# Patient Record
Sex: FEMALE | Race: WHITE | Employment: FULL TIME | URBAN - METROPOLITAN AREA
[De-identification: names, ages, dates, MRNs, and addresses within clinical notes are randomized per-mention and may not be internally consistent; named-entity substitution may affect disease eponyms.]

---

## 2017-05-21 ENCOUNTER — HOSPITAL ENCOUNTER (OUTPATIENT)
Dept: LAB | Age: 32
Discharge: HOME | End: 2017-05-21

## 2017-05-21 ENCOUNTER — HOSPITAL ENCOUNTER (EMERGENCY)
Age: 32
Discharge: HOME | End: 2017-05-21
Attending: EMERGENCY MEDICINE

## 2017-05-21 VITALS
RESPIRATION RATE: 16 BRPM | SYSTOLIC BLOOD PRESSURE: 126 MMHG | BODY MASS INDEX: 35.03 KG/M2 | DIASTOLIC BLOOD PRESSURE: 78 MMHG | WEIGHT: 218 LBS | TEMPERATURE: 97.2 F | OXYGEN SATURATION: 100 % | HEART RATE: 88 BPM | HEIGHT: 66 IN

## 2017-05-21 DIAGNOSIS — R19.7 DIARRHEA, UNSPECIFIED TYPE: ICD-10-CM

## 2017-05-21 DIAGNOSIS — J06.9 ACUTE UPPER RESPIRATORY INFECTION: Primary | ICD-10-CM

## 2017-05-21 DIAGNOSIS — R11.2 NAUSEA AND VOMITING, INTRACTABILITY OF VOMITING NOT SPECIFIED, UNSPECIFIED VOMITING TYPE: ICD-10-CM

## 2017-05-21 LAB — S PYO AG THROAT QL: NEGATIVE

## 2017-05-21 PROCEDURE — 87070 CULTURE OTHR SPECIMN AEROBIC: CPT | Performed by: EMERGENCY MEDICINE

## 2017-05-21 RX ORDER — ONDANSETRON 4 MG/1
4 TABLET, ORALLY DISINTEGRATING ORAL
Status: COMPLETED | OUTPATIENT
Start: 2017-05-21 | End: 2017-05-21

## 2017-05-21 RX ORDER — ONDANSETRON 4 MG/1
4 TABLET, ORALLY DISINTEGRATING ORAL
Qty: 10 TAB | Refills: 0 | Status: SHIPPED | OUTPATIENT
Start: 2017-05-21 | End: 2017-08-26

## 2017-05-21 RX ADMIN — ONDANSETRON 4 MG: 4 TABLET, ORALLY DISINTEGRATING ORAL at 19:41

## 2017-05-21 NOTE — LETTER
NOTIFICATION RETURN TO WORK / SCHOOL 
 
5/21/2017 7:50 PM 
 
Ms. Eri Merchant 
[facility] 7 [id number] To Whom It May Concern: Salma Durham is currently under the care of [address] [place] [address]. She will return to work/school on: 5/23/17 If there are questions or concerns please have the patient contact our office. Sincerely, Britton Babinski.  DO

## 2017-05-21 NOTE — UC PROVIDER NOTE
Patient is a 27 y.o. female presenting with nasal congestion. The history is provided by the patient (woke this AM wtih sore throat and congestion and then progressed to NV or gastric contents and several loose stools). Nasal Congestion   This is a new problem. The current episode started 6 to 12 hours ago. The problem occurs constantly. The problem has been gradually worsening. Pertinent negatives include no chest pain, no abdominal pain, no headaches and no shortness of breath. Nothing aggravates the symptoms. Nothing relieves the symptoms. Treatments tried: OTC medications. The treatment provided no relief. Past Medical History:   Diagnosis Date    Lupus ([address]     PVC's (premature ventricular contractions)         Past Surgical History:   Procedure Laterality Date    HX  SECTION  2012    HX ORTHOPAEDIC Bilateral     knee    HX TONSILLECTOMY           History reviewed. No pertinent family history. Social History     Social History    Marital status: LEGALLY      Spouse name: N/A    Number of children: N/A    Years of education: N/A     Occupational History    Not on file. Social History Main Topics    Smoking status: Former Smoker    Smokeless tobacco: Not on file    Alcohol use Yes      Comment: occasional    Drug use: No    Sexual activity: Not on file     Other Topics Concern    Not on file     Social History Narrative                ALLERGIES: Iodine    Review of Systems   Constitutional: Positive for fatigue. Negative for fever. HENT: Positive for congestion, rhinorrhea and sore throat. Negative for sinus pressure. Respiratory: Negative for shortness of breath. Cardiovascular: Negative. Negative for chest pain. Gastrointestinal: Positive for diarrhea, nausea and vomiting. Negative for abdominal pain. Musculoskeletal: Negative. Skin: Negative. Neurological: Negative for headaches.        Vitals:    17 1930   BP: 126/78   Pulse: 88 Resp: 16   Temp: 97.2 °F (36.2 °C)   SpO2: 100%   Weight: 98.9 kg (218 lb)   Height: 5' 6\" (1.676 m)       Physical Exam   Constitutional: She is oriented to person, place, and time. She appears well-developed and well-nourished. No distress. congested   HENT:   Head: Normocephalic. Right Ear: External ear normal.   Left Ear: External ear normal.   Mouth/Throat: No oropharyngeal exudate. Nasal congestion, Mild post pharynx injection   Eyes: Conjunctivae are normal.   Neck: Normal range of motion. No tracheal deviation present. No thyromegaly present. Cardiovascular: Normal rate, regular rhythm and normal heart sounds. No murmur heard. Pulmonary/Chest: Effort normal and breath sounds normal. No respiratory distress. She has no wheezes. She has no rales. She exhibits no tenderness. Abdominal: Soft. She exhibits no distension. There is no tenderness. There is no rebound and no guarding. hyperactive bowel sounds   Musculoskeletal: Normal range of motion. She exhibits no edema or tenderness. Lymphadenopathy:     She has no cervical adenopathy. Neurological: She is alert and oriented to person, place, and time. Skin: Skin is warm. No erythema. Psychiatric: She has a normal mood and affect. Her behavior is normal.   Nursing note and vitals reviewed. MDM     Differential Diagnosis; Clinical Impression; Plan:     Pregnancy test offered and pt declined    CLINICAL IMPRESSION:  Acute upper respiratory infection  (primary encounter diagnosis)  Nausea and vomiting, intractability of vomiting not specified, unspecified vomiting type  Diarrhea, unspecified type    Plan:  1. zofran  2. rest  3. Supportive care and follow up on throat culture    Amount and/or Complexity of Data Reviewed:   Clinical lab tests:  Ordered and reviewed  Risk of Significant Complications, Morbidity, and/or Mortality:   Presenting problems: Moderate  Management options:   Moderate  Progress:   Patient progress: Stable      Procedures

## 2017-05-21 NOTE — DISCHARGE INSTRUCTIONS
Diarrhea: Care Instructions  Your Care Instructions    Diarrhea is loose, watery stools (bowel movements). The exact cause is often hard to find. Sometimes diarrhea is your body's way of getting rid of what caused an upset stomach. Viruses, food poisoning, and many medicines can cause diarrhea. Some people get diarrhea in response to emotional stress, anxiety, or certain foods. Almost everyone has diarrhea now and then. It usually isn't serious, and your stools will return to normal soon. The important thing to do is replace the fluids you have lost, so you can prevent dehydration. The doctor has checked you carefully, but problems can develop later. If you notice any problems or new symptoms, get medical treatment right away. Follow-up care is a key part of your treatment and safety. Be sure to make and go to all appointments, and call your doctor if you are having problems. It's also a good idea to know your test results and keep a list of the medicines you take. How can you care for yourself at home? · Watch for signs of dehydration, which means your body has lost too much water. Dehydration is a serious condition and should be treated right away. Signs of dehydration are:  ¨ Increasing thirst and dry eyes and mouth. ¨ Feeling faint or lightheaded. ¨ Darker urine, and a smaller amount of urine than normal.  · To prevent dehydration, drink plenty of fluids, enough so that your urine is light yellow or clear like water. Choose water and other caffeine-free clear liquids until you feel better. If you have kidney, heart, or liver disease and have to limit fluids, talk with your doctor before you increase the amount of fluids you drink. · Begin eating small amounts of mild foods the next day, if you feel like it. ¨ Try yogurt that has live cultures of Lactobacillus. (Check the label.)  ¨ Avoid spicy foods, fruits, alcohol, and caffeine until 48 hours after all symptoms are gone.   ¨ Avoid chewing gum that contains sorbitol. ¨ Avoid dairy products (except for yogurt with Lactobacillus) while you have diarrhea and for 3 days after symptoms are gone. · The doctor may recommend that you take over-the-counter medicine, such as loperamide (Imodium), if you still have diarrhea after 6 hours. Read and follow all instructions on the label. Do not use this medicine if you have bloody diarrhea, a high fever, or other signs of serious illness. Call your doctor if you think you are having a problem with your medicine. When should you call for help? Call 911 anytime you think you may need emergency care. For example, call if:  · You passed out (lost consciousness). · Your stools are maroon or very bloody. Call your doctor now or seek immediate medical care if:  · You are dizzy or lightheaded, or you feel like you may faint. · Your stools are black and look like tar, or they have streaks of blood. · You have new or worse belly pain. · You have symptoms of dehydration, such as:  ¨ Dry eyes and a dry mouth. ¨ Passing only a little dark urine. ¨ Feeling thirstier than usual.  · You have a new or higher fever. Watch closely for changes in your health, and be sure to contact your doctor if:  · Your diarrhea is getting worse. · You see pus in the diarrhea. · You are not getting better after 2 days (48 hours). Where can you learn more? Go to http://quincy-quynh.info/. Enter M954 in the search box to learn more about \"Diarrhea: Care Instructions. \"  Current as of: May 27, 2016  Content Version: 11.2  © 9765-9121 [employer]. Care instructions adapted under license by [employer] (which disclaims liability or warranty for this information). If you have questions about a medical condition or this instruction, always ask your healthcare professional. [address] any warranty or liability for your use of this information.          Nausea and Vomiting: Care Instructions  Your Care Instructions    When you are nauseated, you may feel weak and sweaty and notice a lot of saliva in your mouth. Nausea often leads to vomiting. Most of the time you do not need to worry about nausea and vomiting, but they can be signs of other illnesses. Two common causes of nausea and vomiting are stomach flu and food poisoning. Nausea and vomiting from viral stomach flu will usually start to improve within 24 hours. Nausea and vomiting from food poisoning may last from 12 to 48 hours. The doctor has checked you carefully, but problems can develop later. If you notice any problems or new symptoms, get medical treatment right away. Follow-up care is a key part of your treatment and safety. Be sure to make and go to all appointments, and call your doctor if you are having problems. It's also a good idea to know your test results and keep a list of the medicines you take. How can you care for yourself at home? · To prevent dehydration, drink plenty of fluids, enough so that your urine is light yellow or clear like water. Choose water and other caffeine-free clear liquids until you feel better. If you have kidney, heart, or liver disease and have to limit fluids, talk with your doctor before you increase the amount of fluids you drink. · Rest in bed until you feel better. · When you are able to eat, try clear soups, mild foods, and liquids until all symptoms are gone for 12 to 48 hours. Other good choices include dry toast, crackers, cooked cereal, and gelatin dessert, such as Jell-O. When should you call for help? Call 911 anytime you think you may need emergency care. For example, call if:  · You passed out (lost consciousness). Call your doctor now or seek immediate medical care if:  · You have symptoms of dehydration, such as:  ¨ Dry eyes and a dry mouth. ¨ Passing only a little dark urine. ¨ Feeling thirstier than usual.  · You have new or worsening belly pain.   · You have a new or higher fever. · You vomit blood or what looks like coffee grounds. Watch closely for changes in your health, and be sure to contact your doctor if:  · You have ongoing nausea and vomiting. · Your vomiting is getting worse. · Your vomiting lasts longer than 2 days. · You are not getting better as expected. Where can you learn more? Go to http://quincy-quynh.info/. Enter 25 [id number] in the search box to learn more about \"Nausea and Vomiting: Care Instructions. \"  Current as of: May 27, 2016  Content Version: 11.2  © 6675-4113 [employer]. Care instructions adapted under license by [employer] (which disclaims liability or warranty for this information). If you have questions about a medical condition or this instruction, always ask your healthcare professional. [place] [address] any warranty or liability for your use of this information. Upper Respiratory Infection (Cold): Care Instructions  Your Care Instructions    An upper respiratory infection, or URI, is an infection of the nose, sinuses, or throat. URIs are spread by coughs, sneezes, and direct contact. The common cold is the most frequent kind of URI. The flu and sinus infections are other kinds of URIs. Almost all URIs are caused by viruses. Antibiotics won't cure them. But you can treat most infections with home care. This may include drinking lots of fluids and taking over-the-counter pain medicine. You will probably feel better in 4 to 10 days. The doctor has checked you carefully, but problems can develop later. If you notice any problems or new symptoms, get medical treatment right away. Follow-up care is a key part of your treatment and safety. Be sure to make and go to all appointments, and call your doctor if you are having problems. It's also a good idea to know your test results and keep a list of the medicines you take. How can you care for yourself at home?   · To prevent dehydration, drink plenty of fluids, enough so that your urine is light yellow or clear like water. Choose water and other caffeine-free clear liquids until you feel better. If you have kidney, heart, or liver disease and have to limit fluids, talk with your doctor before you increase the amount of fluids you drink. · Take an over-the-counter pain medicine, such as acetaminophen (Tylenol), ibuprofen (Advil, Motrin), or naproxen (Aleve). Read and follow all instructions on the label. · Before you use cough and cold medicines, check the label. These medicines may not be safe for young children or for people with certain health problems. · Be careful when taking over-the-counter cold or flu medicines and Tylenol at the same time. Many of these medicines have acetaminophen, which is Tylenol. Read the labels to make sure that you are not taking more than the recommended dose. Too much acetaminophen (Tylenol) can be harmful. · Get plenty of rest.  · Do not smoke or allow others to smoke around you. If you need help quitting, talk to your doctor about stop-smoking programs and medicines. These can increase your chances of quitting for good. When should you call for help? Call 911 anytime you think you may need emergency care. For example, call if:  · You have severe trouble breathing. Call your doctor now or seek immediate medical care if:  · You seem to be getting much sicker. · You have new or worse trouble breathing. · You have a new or higher fever. · You have a new rash. Watch closely for changes in your health, and be sure to contact your doctor if:  · You have a new symptom, such as a sore throat, an earache, or sinus pain. · You cough more deeply or more often, especially if you notice more mucus or a change in the color of your mucus. · You do not get better as expected. Where can you learn more? Go to http://quincy-quynh.info/.   Enter U120 in the search box to learn more about \"Upper Respiratory Infection (Cold): Care Instructions. \"  Current as of: June 30, 2016  Content Version: 11.2  © 4945-0728 [employer]. Care instructions adapted under license by [employer] (which disclaims liability or warranty for this information). If you have questions about a medical condition or this instruction, always ask your healthcare professional. [address] any warranty or liability for your use of this information. Upper Respiratory Infection (Cold): Care Instructions  Your Care Instructions    An upper respiratory infection, or URI, is an infection of the nose, sinuses, or throat. URIs are spread by coughs, sneezes, and direct contact. The common cold is the most frequent kind of URI. The flu and sinus infections are other kinds of URIs. Almost all URIs are caused by viruses. Antibiotics won't cure them. But you can treat most infections with home care. This may include drinking lots of fluids and taking over-the-counter pain medicine. You will probably feel better in 4 to 10 days. The doctor has checked you carefully, but problems can develop later. If you notice any problems or new symptoms, get medical treatment right away. Follow-up care is a key part of your treatment and safety. Be sure to make and go to all appointments, and call your doctor if you are having problems. It's also a good idea to know your test results and keep a list of the medicines you take. How can you care for yourself at home? · To prevent dehydration, drink plenty of fluids, enough so that your urine is light yellow or clear like water. Choose water and other caffeine-free clear liquids until you feel better. If you have kidney, heart, or liver disease and have to limit fluids, talk with your doctor before you increase the amount of fluids you drink.   · Take an over-the-counter pain medicine, such as acetaminophen (Tylenol), ibuprofen (Advil, Motrin), or naproxen (Becca). Read and follow all instructions on the label. · Before you use cough and cold medicines, check the label. These medicines may not be safe for young children or for people with certain health problems. · Be careful when taking over-the-counter cold or flu medicines and Tylenol at the same time. Many of these medicines have acetaminophen, which is Tylenol. Read the labels to make sure that you are not taking more than the recommended dose. Too much acetaminophen (Tylenol) can be harmful. · Get plenty of rest.  · Do not smoke or allow others to smoke around you. If you need help quitting, talk to your doctor about stop-smoking programs and medicines. These can increase your chances of quitting for good. When should you call for help? Call 911 anytime you think you may need emergency care. For example, call if:  · You have severe trouble breathing. Call your doctor now or seek immediate medical care if:  · You seem to be getting much sicker. · You have new or worse trouble breathing. · You have a new or higher fever. · You have a new rash. Watch closely for changes in your health, and be sure to contact your doctor if:  · You have a new symptom, such as a sore throat, an earache, or sinus pain. · You cough more deeply or more often, especially if you notice more mucus or a change in the color of your mucus. · You do not get better as expected. Where can you learn more? Go to http://quincy-quynh.info/. Enter M454 in the search box to learn more about \"Upper Respiratory Infection (Cold): Care Instructions. \"  Current as of: June 30, 2016  Content Version: 11.2  © 1242-2409 [employer]. Care instructions adapted under license by [employer] (which disclaims liability or warranty for this information).  If you have questions about a medical condition or this instruction, always ask your healthcare professional. Madeline Schuler disclaims any warranty or liability for your use of this information.

## 2017-05-23 LAB
BACTERIA SPEC CULT: NORMAL
SERVICE CMNT-IMP: NORMAL

## 2017-07-12 ENCOUNTER — HOSPITAL ENCOUNTER (EMERGENCY)
Age: 32
Discharge: HOME | End: 2017-07-12
Attending: FAMILY MEDICINE

## 2017-07-12 ENCOUNTER — APPOINTMENT (OUTPATIENT)
Dept: GENERAL RADIOLOGY | Age: 32
End: 2017-07-12
Attending: FAMILY MEDICINE

## 2017-07-12 VITALS
OXYGEN SATURATION: 97 % | SYSTOLIC BLOOD PRESSURE: 143 MMHG | BODY MASS INDEX: 35.2 KG/M2 | HEART RATE: 85 BPM | TEMPERATURE: 98.6 F | RESPIRATION RATE: 18 BRPM | WEIGHT: 219 LBS | DIASTOLIC BLOOD PRESSURE: 87 MMHG | HEIGHT: 66 IN

## 2017-07-12 DIAGNOSIS — S20.229A CONTUSION OF BACK, UNSPECIFIED LATERALITY, INITIAL ENCOUNTER: Primary | ICD-10-CM

## 2017-07-12 RX ORDER — METHOCARBAMOL 750 MG/1
750 TABLET, FILM COATED ORAL
Qty: 20 TAB | Refills: 0 | Status: SHIPPED | OUTPATIENT
Start: 2017-07-12 | End: 2017-08-26

## 2017-07-12 RX ORDER — KETOROLAC TROMETHAMINE 30 MG/ML
60 INJECTION, SOLUTION INTRAMUSCULAR; INTRAVENOUS
Status: COMPLETED | OUTPATIENT
Start: 2017-07-12 | End: 2017-07-12

## 2017-07-12 RX ORDER — HYDROCODONE BITARTRATE AND ACETAMINOPHEN 7.5; 325 MG/1; MG/1
1 TABLET ORAL
Qty: 15 TAB | Refills: 0 | Status: SHIPPED | OUTPATIENT
Start: 2017-07-12 | End: 2017-08-26

## 2017-07-12 RX ADMIN — KETOROLAC TROMETHAMINE 60 MG: 30 INJECTION, SOLUTION INTRAMUSCULAR; INTRAVENOUS at 21:00

## 2017-07-13 NOTE — DISCHARGE INSTRUCTIONS
Low Back Pain: Exercises  Your Care Instructions  Here are some examples of typical rehabilitation exercises for your condition. Start each exercise slowly. Ease off the exercise if you start to have pain. Your doctor or physical therapist will tell you when you can start these exercises and which ones will work best for you. How to do the exercises  Press-up    1. Lie on your stomach, supporting your body with your forearms. 2. Press your elbows down into the floor to raise your upper back. As you do this, relax your stomach muscles and allow your back to arch without using your back muscles. As your press up, do not let your hips or pelvis come off the floor. 3. Hold for 15 to 30 seconds, then relax. 4. Repeat 2 to 4 times. Alternate arm and leg (bird dog) exercise    Note: Do this exercise slowly. Try to keep your body straight at all times, and do not let one hip drop lower than the other. 1. Start on the floor, on your hands and knees. 2. Tighten your belly muscles. 3. Raise one leg off the floor, and hold it straight out behind you. Be careful not to let your hip drop down, because that will twist your trunk. 4. Hold for about 6 seconds, then lower your leg and switch to the other leg. 5. Repeat 8 to 12 times on each leg. 6. Over time, work up to holding for 10 to 30 seconds each time. 7. If you feel stable and secure with your leg raised, try raising the opposite arm straight out in front of you at the same time. Knee-to-chest exercise    1. Lie on your back with your knees bent and your feet flat on the floor. 2. Bring one knee to your chest, keeping the other foot flat on the floor (or keeping the other leg straight, whichever feels better on your lower back). 3. Keep your lower back pressed to the floor. Hold for at least 15 to 30 seconds. 4. Relax, and lower the knee to the starting position. 5. Repeat with the other leg. Repeat 2 to 4 times with each leg.   6. To get more stretch, put your other leg flat on the floor while pulling your knee to your chest.  Curl-ups    1. Lie on the floor on your back with your knees bent at a 90-degree angle. Your feet should be flat on the floor, about 12 inches from your buttocks. 2. Cross your arms over your chest. If this bothers your neck, try putting your hands behind your neck (not your head), with your elbows spread apart. 3. Slowly tighten your belly muscles and raise your shoulder blades off the floor. 4. Keep your head in line with your body, and do not press your chin to your chest.  5. Hold this position for 1 or 2 seconds, then slowly lower yourself back down to the floor. 6. Repeat 8 to 12 times. Pelvic tilt exercise    1. Lie on your back with your knees bent. 2. \"Brace\" your stomach. This means to tighten your muscles by pulling in and imagining your belly button moving toward your spine. You should feel like your back is pressing to the floor and your hips and pelvis are rocking back. 3. Hold for about 6 seconds while you breathe smoothly. 4. Repeat 8 to 12 times. Heel dig bridging    1. Lie on your back with both knees bent and your ankles bent so that only your heels are digging into the floor. Your knees should be bent about 90 degrees. 2. Then push your heels into the floor, squeeze your buttocks, and lift your hips off the floor until your shoulders, hips, and knees are all in a straight line. 3. Hold for about 6 seconds as you continue to breathe normally, and then slowly lower your hips back down to the floor and rest for up to 10 seconds. 4. Do 8 to 12 repetitions. Hamstring stretch in doorway    1. Lie on your back in a doorway, with one leg through the open door. 2. Slide your leg up the wall to straighten your knee. You should feel a gentle stretch down the back of your leg. 3. Hold the stretch for at least 15 to 30 seconds. Do not arch your back, point your toes, or bend either knee.  Keep one heel touching the floor and the other heel touching the wall. 4. Repeat with your other leg. 5. Do 2 to 4 times for each leg. Hip flexor stretch    1. Kneel on the floor with one knee bent and one leg behind you. Place your forward knee over your foot. Keep your other knee touching the floor. 2. Slowly push your hips forward until you feel a stretch in the upper thigh of your rear leg. 3. Hold the stretch for at least 15 to 30 seconds. Repeat with your other leg. 4. Do 2 to 4 times on each side. Wall sit    1. Stand with your back 10 to 12 inches away from a wall. 2. Lean into the wall until your back is flat against it. 3. Slowly slide down until your knees are slightly bent, pressing your lower back into the wall. 4. Hold for about 6 seconds, then slide back up the wall. 5. Repeat 8 to 12 times. Follow-up care is a key part of your treatment and safety. Be sure to make and go to all appointments, and call your doctor if you are having problems. It's also a good idea to know your test results and keep a list of the medicines you take. Where can you learn more? Go to http://quincyTrakaquynh.info/. Enter Q089 in the search box to learn more about \"Low Back Pain: Exercises. \"  Current as of: March 21, 2017  Content Version: 11.3  © 2621-6751 [employer]. Care instructions adapted under license by [employer] (which disclaims liability or warranty for this information). If you have questions about a medical condition or this instruction, always ask your healthcare professional. [place] [address] any warranty or liability for your use of this information. Healthy Upper Back: Exercises  Your Care Instructions  Here are some examples of exercises for your upper back. Start each exercise slowly. Ease off the exercise if you start to have pain.   Your doctor or physical therapist will tell you when you can start these exercises and which ones will work best for you.  How to do the exercises  Lower neck and upper back stretch    5. Stretch your arms out in front of your body. Clasp one hand on top of your other hand. 6. Gently reach out so that you feel your shoulder blades stretching away from each other. 7. Gently bend your head forward. 8. Hold for 15 to 30 seconds. 9. Repeat 2 to 4 times. Midback stretch    Note: If you have knee pain, do not do this exercise. 8. Kneel on the floor, and sit back on your ankles. 9. Lean forward, place your hands on the floor, and stretch your arms out in front of you. Rest your head between your arms. 10. Gently push your chest toward the floor, reaching as far in front of you as possible. 11. Hold for 15 to 30 seconds. 12. Repeat 2 to 4 times. Shoulder rolls    7. Sit comfortably with your feet shoulder-width apart. You can also do this exercise while standing. 8. Roll your shoulders up, then back, and then down in a smooth, circular motion. 9. Repeat 2 to 4 times. Wall push-up    7. Stand against a wall with your feet about 12 to 24 inches back from the wall. If you feel any pain when you do this exercise, stand closer to the wall. 8. Place your hands on the wall slightly wider apart than your shoulders, and lean forward. 9. Gently lean your body toward the wall. Then push back to your starting position. Keep the motion smooth and controlled. 10. Repeat 8 to 12 times. Resisted shoulder blade squeeze    Note: For this exercise, you will need elastic exercise material, such as surgical tubing or Thera-Band.  5. Sit or stand, holding the band in both hands in front of you. Keep your elbows close to your sides, bent at a 90-degree angle. Your palms should face up. 6. Squeeze your shoulder blades together, and move your arms to the outside, stretching the band. Be sure to keep your elbows at your sides while you do this. 7. Relax. 8. Repeat 8 to 12 times.   Resisted rows    Note: For this exercise, you will need elastic exercise material, such as surgical tubing or Thera-Band. 5. Put the band around a solid object, such as a bedpost, at about waist level. Hold one end of the band in each hand. 6. With your elbows at your sides and bent to 90 degrees, pull the band back to move your shoulder blades toward each other. Return to the starting position. 7. Repeat 8 to 12 times. Follow-up care is a key part of your treatment and safety. Be sure to make and go to all appointments, and call your doctor if you are having problems. It's also a good idea to know your test results and keep a list of the medicines you take. Where can you learn more? Go to http://quincy-quynh.info/. Enter X701 in the search box to learn more about \"Healthy Upper Back: Exercises. \"  Current as of: March 21, 2017  Content Version: 11.3  © 7222-6355 [employer]. Care instructions adapted under license by [employer] (which disclaims liability or warranty for this information). If you have questions about a medical condition or this instruction, always ask your healthcare professional. [place] [address] any warranty or liability for your use of this information. Contusion: Care Instructions  Your Care Instructions  Contusion is the medical term for a bruise. It is the result of a direct blow or an impact, such as a fall. Contusions are common sports injuries. Most people think of a bruise as a black-and-blue spot. This happens when small blood vessels get torn and leak blood under the skin. But bones, muscles, and organs can also get bruised. This may damage deep tissues but not cause a bruise you can see. The doctor will do a physical exam to find the location of your contusion. You may also have tests to make sure you do not have a more serious injury, such as a broken bone or nerve damage. These may include X-rays or other imaging tests like a CT scan or MRI.   Deep-tissue contusions may cause pain and swelling. But if there is no serious damage, they will often get better in a few weeks with home treatment. The doctor has checked you carefully, but problems can develop later. If you notice any problems or new symptoms, get medical treatment right away. Follow-up care is a key part of your treatment and safety. Be sure to make and go to all appointments, and call your doctor if you are having problems. It's also a good idea to know your test results and keep a list of the medicines you take. How can you care for yourself at home? · Put ice or a cold pack on the sore area for 10 to 20 minutes at a time to stop swelling. Put a thin cloth between the ice pack and your skin. · Be safe with medicines. Read and follow all instructions on the label. ¨ If the doctor gave you a prescription medicine for pain, take it as prescribed. ¨ If you are not taking a prescription pain medicine, ask your doctor if you can take an over-the-counter medicine. · If you can, prop up the sore area on pillows as much as possible for the next few days. Try to keep the sore area above the level of your heart. When should you call for help? Call your doctor now or seek immediate medical care if:  · Your pain gets worse. · You have new or worse swelling. · You have tingling, weakness, or numbness in the area near the contusion. · The area near the contusion is cold or pale. Watch closely for changes in your health, and be sure to contact your doctor if:  · You do not get better as expected. Where can you learn more? Go to http://quincy-quynh.info/. Enter Q840 in the search box to learn more about \"Contusion: Care Instructions. \"  Current as of: March 20, 2017  Content Version: 11.3  © 2987-0840 [employer]. Care instructions adapted under license by [employer] (which disclaims liability or warranty for this information).  If you have questions about a medical condition or this instruction, always ask your healthcare professional. [place] [address] any warranty or liability for your use of this information.

## 2017-07-16 NOTE — UC PROVIDER NOTE
Patient is a 27 y.o. female presenting with back pain. The history is provided by the patient. Back Pain    This is a new problem. The current episode started 3 to 5 hours ago. The problem has not changed since onset. The problem occurs constantly. Patient reports not work related injury. The pain is associated with recent trauma (see nurse note). The pain is present in the upper back, middle back, thoracic spine and generalized. The quality of the pain is described as aching. The pain does not radiate. The pain is at a severity of 6/10. The pain is moderate. The symptoms are aggravated by bending and twisting. Associated symptoms comments: none. She has tried nothing for the symptoms. Past Medical History:   Diagnosis Date    Lupus ([address]     PVC's (premature ventricular contractions)         Past Surgical History:   Procedure Laterality Date    HX  SECTION  2012    HX ORTHOPAEDIC Bilateral     knee    HX TONSILLECTOMY           No family history on file. Social History     Social History    Marital status: LEGALLY      Spouse name: N/A    Number of children: N/A    Years of education: N/A     Occupational History    Not on file. Social History Main Topics    Smoking status: Former Smoker    Smokeless tobacco: Not on file    Alcohol use Yes      Comment: occasional    Drug use: No    Sexual activity: Not on file     Other Topics Concern    Not on file     Social History Narrative                ALLERGIES: Iodine    Review of Systems   Musculoskeletal: Positive for back pain. All other systems reviewed and are negative. Vitals:    17   BP: 143/87   Pulse: 85   Resp: 18   Temp: 98.6 °F (37 °C)   SpO2: 97%   Weight: 99.3 kg (219 lb)   Height: 5' 6\" (1.676 m)       Physical Exam   Constitutional: No distress. Musculoskeletal: She exhibits no edema.         Cervical back: Normal.        Thoracic back: She exhibits decreased range of motion, tenderness, bony tenderness and spasm. She exhibits no swelling, no edema, no deformity and no pain. Lumbar back: Normal.        Back:    Nursing note and vitals reviewed. MDM     Differential Diagnosis; Clinical Impression; Plan:     CLINICAL IMPRESSION:  Contusion of back, unspecified laterality, initial encounter  (primary encounter diagnosis)      DDX    Plan:    No fracture of vertebra on xray  Robaxin/ naprosyn and norco for pain   Amount and/or Complexity of Data Reviewed:   Tests in the radiology section of CPT®:  Ordered and reviewed   Review and summarize past medical records:  Yes  Risk of Significant Complications, Morbidity, and/or Mortality:   Presenting problems: Moderate  Diagnostic procedures: Moderate  Management options:   Moderate      Procedures

## 2017-08-26 ENCOUNTER — HOSPITAL ENCOUNTER (EMERGENCY)
Age: 32
Discharge: HOME | End: 2017-08-26
Attending: FAMILY MEDICINE

## 2017-08-26 VITALS
TEMPERATURE: 98 F | OXYGEN SATURATION: 98 % | HEART RATE: 89 BPM | SYSTOLIC BLOOD PRESSURE: 128 MMHG | DIASTOLIC BLOOD PRESSURE: 73 MMHG | RESPIRATION RATE: 16 BRPM | HEIGHT: 66 IN | BODY MASS INDEX: 34.39 KG/M2 | WEIGHT: 214 LBS

## 2017-08-26 DIAGNOSIS — L02.02 FURUNCLE OF FACE: Primary | ICD-10-CM

## 2017-08-26 RX ORDER — MUPIROCIN 20 MG/G
OINTMENT TOPICAL 3 TIMES DAILY
Qty: 22 G | Refills: 0 | Status: ON HOLD | OUTPATIENT
Start: 2017-08-26 | End: 2017-09-18

## 2017-08-26 RX ORDER — SULFAMETHOXAZOLE AND TRIMETHOPRIM 800; 160 MG/1; MG/1
1 TABLET ORAL 2 TIMES DAILY
Qty: 20 TAB | Refills: 0 | Status: SHIPPED | OUTPATIENT
Start: 2017-08-26 | End: 2017-09-05

## 2017-08-26 NOTE — UC PROVIDER NOTE
Patient is a 27 y.o. female presenting with skin problem. The history is provided by the patient. Skin Problem    This is a new problem. The current episode started more than 2 days ago. The problem has been gradually worsening. The problem is associated with an unknown factor. There has been no fever. The rash is present on the face (rt cheek). The pain is at a severity of 7/10. The pain is moderate. The pain has been constant since onset. Associated symptoms include pain. She has tried nothing for the symptoms. Past Medical History:   Diagnosis Date    Lupus ([address]     PVC's (premature ventricular contractions)         Past Surgical History:   Procedure Laterality Date    HX  SECTION  2012    HX ORTHOPAEDIC Bilateral     knee    HX TONSILLECTOMY           History reviewed. No pertinent family history. Social History     Social History    Marital status: LEGALLY      Spouse name: N/A    Number of children: N/A    Years of education: N/A     Occupational History    Not on file. Social History Main Topics    Smoking status: Former Smoker    Smokeless tobacco: Never Used    Alcohol use Yes      Comment: occasional    Drug use: No    Sexual activity: Not on file     Other Topics Concern    Not on file     Social History Narrative                ALLERGIES: Iodine    Review of Systems   All other systems reviewed and are negative. Vitals:    17 1306 17 1309   BP:  128/73   Pulse:  89   Resp:  16   Temp:  98 °F (36.7 °C)   SpO2:  98%   Weight: 97.1 kg (214 lb)    Height: 5' 6\" (1.676 m)        Physical Exam   Constitutional: No distress. Skin: Lesion (single indurated aerea with exudate base on rt cheek- tender- no drainage ) noted. Nursing note and vitals reviewed.       MDM     Differential Diagnosis; Clinical Impression; Plan:     CLINICAL IMPRESSION:  Furuncle of face  (primary encounter diagnosis)      DDX    Plan:    Warm compress- bactroban and oral bactrim bid x 10 days  Follow in 3 datys   Amount and/or Complexity of Data Reviewed:    Review and summarize past medical records:  Yes  Risk of Significant Complications, Morbidity, and/or Mortality:   Presenting problems: Moderate  Management options:   Moderate  Progress:   Patient progress:  Stable      Procedures

## 2017-08-26 NOTE — DISCHARGE INSTRUCTIONS

## 2017-09-09 ENCOUNTER — HOSPITAL ENCOUNTER (EMERGENCY)
Age: 32
Discharge: HOME | End: 2017-09-09
Attending: FAMILY MEDICINE

## 2017-09-09 VITALS
TEMPERATURE: 98.7 F | HEIGHT: 66 IN | SYSTOLIC BLOOD PRESSURE: 118 MMHG | OXYGEN SATURATION: 98 % | DIASTOLIC BLOOD PRESSURE: 91 MMHG | WEIGHT: 214 LBS | HEART RATE: 100 BPM | RESPIRATION RATE: 16 BRPM | BODY MASS INDEX: 34.39 KG/M2

## 2017-09-09 DIAGNOSIS — M54.31 SCIATICA OF RIGHT SIDE: Primary | ICD-10-CM

## 2017-09-09 RX ORDER — KETOROLAC TROMETHAMINE 30 MG/ML
60 INJECTION, SOLUTION INTRAMUSCULAR; INTRAVENOUS
Status: COMPLETED | OUTPATIENT
Start: 2017-09-09 | End: 2017-09-09

## 2017-09-09 RX ORDER — PREDNISONE 20 MG/1
20 TABLET ORAL DAILY
Qty: 10 TAB | Refills: 0 | Status: ON HOLD | OUTPATIENT
Start: 2017-09-09 | End: 2017-09-18 | Stop reason: DRUGHIGH

## 2017-09-09 RX ADMIN — KETOROLAC TROMETHAMINE 60 MG: 30 INJECTION, SOLUTION INTRAMUSCULAR; INTRAVENOUS at 14:34

## 2017-09-09 NOTE — UC PROVIDER NOTE
Patient is a 27 y.o. female presenting with back pain. The history is provided by the patient. Back Pain    This is a recurrent problem. The current episode started 6 to 12 hours ago. The problem has not changed since onset. The problem occurs constantly. Patient reports not work related injury. The pain is associated with no known injury. The quality of the pain is described as aching. The pain does not radiate. The pain is at a severity of 9/10. The pain is the same all the time. Pertinent negatives include no chest pain and no fever. She has tried nothing for the symptoms. Past Medical History:   Diagnosis Date    Lupus ([address]     PVC's (premature ventricular contractions)         Past Surgical History:   Procedure Laterality Date    HX  SECTION  2012    HX ORTHOPAEDIC Bilateral     knee    HX TONSILLECTOMY           History reviewed. No pertinent family history. Social History     Social History    Marital status: LEGALLY      Spouse name: N/A    Number of children: N/A    Years of education: N/A     Occupational History    Not on file. Social History Main Topics    Smoking status: Former Smoker    Smokeless tobacco: Never Used    Alcohol use Yes      Comment: occasional    Drug use: No    Sexual activity: Not on file     Other Topics Concern    Not on file     Social History Narrative                ALLERGIES: Iodine    Review of Systems   Constitutional: Negative for fever. Cardiovascular: Negative for chest pain. Musculoskeletal: Positive for back pain. Vitals:    17 1357   BP: (!) 118/91   Pulse: 100   Resp: 16   Temp: 98.7 °F (37.1 °C)   SpO2: 98%   Weight: 97.1 kg (214 lb)   Height: 5' 6\" (1.676 m)       Physical Exam   Constitutional: She is oriented to person, place, and time. She appears well-developed and well-nourished. Eyes: Conjunctivae and EOM are normal.   Cardiovascular: Normal rate and regular rhythm.     Pulmonary/Chest: Effort normal and breath sounds normal.   Neurological: She is alert and oriented to person, place, and time. Skin: Skin is warm and dry. Psychiatric: She has a normal mood and affect. Her behavior is normal. Judgment and thought content normal.   Nursing note and vitals reviewed. MDM     Differential Diagnosis; Clinical Impression; Plan:     CLINICAL IMPRESSION:  Sciatica of right side  (primary encounter diagnosis)    Plan:  1. Prednisone 20 mg  2.   3.   Risk of Significant Complications, Morbidity, and/or Mortality:   Presenting problems: Moderate  Diagnostic procedures: Moderate  Management options:   Moderate  Progress:   Patient progress:  Stable      Procedures

## 2017-09-18 ENCOUNTER — APPOINTMENT (OUTPATIENT)
Dept: GENERAL RADIOLOGY | Age: 32
DRG: 853 | End: 2017-09-18
Attending: PHYSICIAN ASSISTANT
Payer: COMMERCIAL

## 2017-09-18 ENCOUNTER — APPOINTMENT (OUTPATIENT)
Dept: MRI IMAGING | Age: 32
DRG: 853 | End: 2017-09-18
Attending: INTERNAL MEDICINE
Payer: COMMERCIAL

## 2017-09-18 ENCOUNTER — APPOINTMENT (OUTPATIENT)
Dept: CT IMAGING | Age: 32
DRG: 853 | End: 2017-09-18
Attending: PHYSICIAN ASSISTANT
Payer: COMMERCIAL

## 2017-09-18 ENCOUNTER — HOSPITAL ENCOUNTER (INPATIENT)
Age: 32
LOS: 7 days | Discharge: HOME HEALTH | DRG: 853 | End: 2017-09-25
Attending: EMERGENCY MEDICINE | Admitting: FAMILY MEDICINE
Payer: COMMERCIAL

## 2017-09-18 DIAGNOSIS — M54.9 RIGHT-SIDED BACK PAIN, UNSPECIFIED BACK LOCATION, UNSPECIFIED CHRONICITY: ICD-10-CM

## 2017-09-18 DIAGNOSIS — M32.9 SYSTEMIC LUPUS ERYTHEMATOSUS, UNSPECIFIED SLE TYPE, UNSPECIFIED ORGAN INVOLVEMENT STATUS (HCC): ICD-10-CM

## 2017-09-18 DIAGNOSIS — R50.9 FEVER, UNSPECIFIED FEVER CAUSE: Primary | ICD-10-CM

## 2017-09-18 DIAGNOSIS — D84.9 IMMUNOCOMPROMISED (HCC): ICD-10-CM

## 2017-09-18 PROBLEM — D72.829 LEUKOCYTOSIS: Status: ACTIVE | Noted: 2017-09-18

## 2017-09-18 PROBLEM — R82.71 BACTERIURIA: Status: ACTIVE | Noted: 2017-09-18

## 2017-09-18 PROBLEM — E87.6 HYPOKALEMIA: Status: ACTIVE | Noted: 2017-09-18

## 2017-09-18 PROBLEM — R65.10 SIRS (SYSTEMIC INFLAMMATORY RESPONSE SYNDROME) (HCC): Status: ACTIVE | Noted: 2017-09-18

## 2017-09-18 PROBLEM — E66.9 OBESITY (BMI 30-39.9): Chronic | Status: ACTIVE | Noted: 2017-09-18

## 2017-09-18 LAB
ALBUMIN SERPL-MCNC: 3 G/DL (ref 3.5–5)
ALBUMIN/GLOB SERPL: 0.7 {RATIO} (ref 1.1–2.2)
ALP SERPL-CCNC: 80 U/L (ref 45–117)
ALT SERPL-CCNC: 63 U/L (ref 12–78)
ANION GAP SERPL CALC-SCNC: 9 MMOL/L (ref 5–15)
APPEARANCE UR: CLEAR
APTT PPP: 35.7 SEC (ref 22.1–32.5)
AST SERPL-CCNC: 15 U/L (ref 15–37)
BACTERIA URNS QL MICRO: ABNORMAL /HPF
BASOPHILS # BLD: 0 K/UL (ref 0–0.1)
BASOPHILS NFR BLD: 0 % (ref 0–1)
BILIRUB SERPL-MCNC: 0.4 MG/DL (ref 0.2–1)
BILIRUB UR QL: NEGATIVE
BUN SERPL-MCNC: 16 MG/DL (ref 6–20)
BUN/CREAT SERPL: 22 (ref 12–20)
CALCIUM SERPL-MCNC: 8.7 MG/DL (ref 8.5–10.1)
CHLORIDE SERPL-SCNC: 102 MMOL/L (ref 97–108)
CO2 SERPL-SCNC: 27 MMOL/L (ref 21–32)
COLOR UR: ABNORMAL
CREAT SERPL-MCNC: 0.72 MG/DL (ref 0.55–1.02)
EOSINOPHIL # BLD: 0 K/UL (ref 0–0.4)
EOSINOPHIL NFR BLD: 0 % (ref 0–7)
EPITH CASTS URNS QL MICRO: ABNORMAL /LPF
ERYTHROCYTE [DISTWIDTH] IN BLOOD BY AUTOMATED COUNT: 12.7 % (ref 11.5–14.5)
GLOBULIN SER CALC-MCNC: 4.1 G/DL (ref 2–4)
GLUCOSE SERPL-MCNC: 98 MG/DL (ref 65–100)
GLUCOSE UR STRIP.AUTO-MCNC: NEGATIVE MG/DL
HCG UR QL: NEGATIVE
HCT VFR BLD AUTO: 38 % (ref 35–47)
HGB BLD-MCNC: 12.9 G/DL (ref 11.5–16)
HGB UR QL STRIP: NEGATIVE
INR PPP: 1.1 (ref 0.9–1.1)
KETONES UR QL STRIP.AUTO: NEGATIVE MG/DL
LACTATE SERPL-SCNC: 1.3 MMOL/L (ref 0.4–2)
LEUKOCYTE ESTERASE UR QL STRIP.AUTO: NEGATIVE
LIPASE SERPL-CCNC: 145 U/L (ref 73–393)
LYMPHOCYTES # BLD: 2 K/UL (ref 0.8–3.5)
LYMPHOCYTES NFR BLD: 12 % (ref 12–49)
MCH RBC QN AUTO: 31.9 PG (ref 26–34)
MCHC RBC AUTO-ENTMCNC: 33.9 G/DL (ref 30–36.5)
MCV RBC AUTO: 94.1 FL (ref 80–99)
MONOCYTES # BLD: 1.4 K/UL (ref 0–1)
MONOCYTES NFR BLD: 9 % (ref 5–13)
NEUTS SEG # BLD: 12.5 K/UL (ref 1.8–8)
NEUTS SEG NFR BLD: 79 % (ref 32–75)
NITRITE UR QL STRIP.AUTO: NEGATIVE
PH UR STRIP: 6 [PH] (ref 5–8)
PLATELET # BLD AUTO: 293 K/UL (ref 150–400)
POTASSIUM SERPL-SCNC: 3.4 MMOL/L (ref 3.5–5.1)
PROT SERPL-MCNC: 7.1 G/DL (ref 6.4–8.2)
PROT UR STRIP-MCNC: NEGATIVE MG/DL
PROTHROMBIN TIME: 11.4 SEC (ref 9–11.1)
RBC # BLD AUTO: 4.04 M/UL (ref 3.8–5.2)
RBC #/AREA URNS HPF: ABNORMAL /HPF (ref 0–5)
SODIUM SERPL-SCNC: 138 MMOL/L (ref 136–145)
SP GR UR REFRACTOMETRY: 1.03 (ref 1–1.03)
THERAPEUTIC RANGE,PTTT: ABNORMAL SECS (ref 58–77)
UR CULT HOLD, URHOLD: NORMAL
UROBILINOGEN UR QL STRIP.AUTO: 0.2 EU/DL (ref 0.2–1)
WBC # BLD AUTO: 15.9 K/UL (ref 3.6–11)
WBC URNS QL MICRO: ABNORMAL /HPF (ref 0–4)

## 2017-09-18 PROCEDURE — 74011000258 HC RX REV CODE- 258: Performed by: FAMILY MEDICINE

## 2017-09-18 PROCEDURE — 87077 CULTURE AEROBIC IDENTIFY: CPT | Performed by: PHYSICIAN ASSISTANT

## 2017-09-18 PROCEDURE — 74176 CT ABD & PELVIS W/O CONTRAST: CPT

## 2017-09-18 PROCEDURE — 74011636637 HC RX REV CODE- 636/637: Performed by: INTERNAL MEDICINE

## 2017-09-18 PROCEDURE — 81001 URINALYSIS AUTO W/SCOPE: CPT | Performed by: PHYSICIAN ASSISTANT

## 2017-09-18 PROCEDURE — 36415 COLL VENOUS BLD VENIPUNCTURE: CPT | Performed by: NEUROLOGICAL SURGERY

## 2017-09-18 PROCEDURE — 85730 THROMBOPLASTIN TIME PARTIAL: CPT | Performed by: NEUROLOGICAL SURGERY

## 2017-09-18 PROCEDURE — 85610 PROTHROMBIN TIME: CPT | Performed by: NEUROLOGICAL SURGERY

## 2017-09-18 PROCEDURE — 87147 CULTURE TYPE IMMUNOLOGIC: CPT | Performed by: PHYSICIAN ASSISTANT

## 2017-09-18 PROCEDURE — 96361 HYDRATE IV INFUSION ADD-ON: CPT

## 2017-09-18 PROCEDURE — 96376 TX/PRO/DX INJ SAME DRUG ADON: CPT

## 2017-09-18 PROCEDURE — 83690 ASSAY OF LIPASE: CPT | Performed by: INTERNAL MEDICINE

## 2017-09-18 PROCEDURE — A9576 INJ PROHANCE MULTIPACK: HCPCS | Performed by: FAMILY MEDICINE

## 2017-09-18 PROCEDURE — 74011250636 HC RX REV CODE- 250/636: Performed by: INTERNAL MEDICINE

## 2017-09-18 PROCEDURE — 74011250636 HC RX REV CODE- 250/636: Performed by: FAMILY MEDICINE

## 2017-09-18 PROCEDURE — 87186 SC STD MICRODIL/AGAR DIL: CPT | Performed by: PHYSICIAN ASSISTANT

## 2017-09-18 PROCEDURE — 87040 BLOOD CULTURE FOR BACTERIA: CPT | Performed by: PHYSICIAN ASSISTANT

## 2017-09-18 PROCEDURE — 85025 COMPLETE CBC W/AUTO DIFF WBC: CPT | Performed by: PHYSICIAN ASSISTANT

## 2017-09-18 PROCEDURE — 71010 XR CHEST PORT: CPT

## 2017-09-18 PROCEDURE — 74011250637 HC RX REV CODE- 250/637: Performed by: INTERNAL MEDICINE

## 2017-09-18 PROCEDURE — 96365 THER/PROPH/DIAG IV INF INIT: CPT

## 2017-09-18 PROCEDURE — 96375 TX/PRO/DX INJ NEW DRUG ADDON: CPT

## 2017-09-18 PROCEDURE — 65270000029 HC RM PRIVATE

## 2017-09-18 PROCEDURE — 81025 URINE PREGNANCY TEST: CPT

## 2017-09-18 PROCEDURE — 80053 COMPREHEN METABOLIC PANEL: CPT | Performed by: PHYSICIAN ASSISTANT

## 2017-09-18 PROCEDURE — 74011000258 HC RX REV CODE- 258: Performed by: PHYSICIAN ASSISTANT

## 2017-09-18 PROCEDURE — 99284 EMERGENCY DEPT VISIT MOD MDM: CPT

## 2017-09-18 PROCEDURE — 72100 X-RAY EXAM L-S SPINE 2/3 VWS: CPT

## 2017-09-18 PROCEDURE — 74011250636 HC RX REV CODE- 250/636: Performed by: HOSPITALIST

## 2017-09-18 PROCEDURE — 83605 ASSAY OF LACTIC ACID: CPT | Performed by: PHYSICIAN ASSISTANT

## 2017-09-18 PROCEDURE — 72158 MRI LUMBAR SPINE W/O & W/DYE: CPT

## 2017-09-18 PROCEDURE — 74011250636 HC RX REV CODE- 250/636: Performed by: PHYSICIAN ASSISTANT

## 2017-09-18 PROCEDURE — 74011000258 HC RX REV CODE- 258: Performed by: HOSPITALIST

## 2017-09-18 RX ORDER — LIDOCAINE 50 MG/G
1 PATCH TOPICAL EVERY 24 HOURS
Status: DISCONTINUED | OUTPATIENT
Start: 2017-09-18 | End: 2017-09-19

## 2017-09-18 RX ORDER — SODIUM CHLORIDE 0.9 % (FLUSH) 0.9 %
5-10 SYRINGE (ML) INJECTION AS NEEDED
Status: DISCONTINUED | OUTPATIENT
Start: 2017-09-18 | End: 2017-09-25 | Stop reason: SDUPTHER

## 2017-09-18 RX ORDER — OXYCODONE AND ACETAMINOPHEN 5; 325 MG/1; MG/1
1 TABLET ORAL
Status: DISCONTINUED | OUTPATIENT
Start: 2017-09-18 | End: 2017-09-18

## 2017-09-18 RX ORDER — SODIUM CHLORIDE 0.9 % (FLUSH) 0.9 %
5-10 SYRINGE (ML) INJECTION EVERY 8 HOURS
Status: DISCONTINUED | OUTPATIENT
Start: 2017-09-18 | End: 2017-09-25 | Stop reason: SDUPTHER

## 2017-09-18 RX ORDER — OXYCODONE AND ACETAMINOPHEN 5; 325 MG/1; MG/1
1 TABLET ORAL
Status: ON HOLD | COMMUNITY
End: 2017-09-25

## 2017-09-18 RX ORDER — HYDROMORPHONE HYDROCHLORIDE 1 MG/ML
1 INJECTION, SOLUTION INTRAMUSCULAR; INTRAVENOUS; SUBCUTANEOUS
Status: COMPLETED | OUTPATIENT
Start: 2017-09-18 | End: 2017-09-18

## 2017-09-18 RX ORDER — KETOROLAC TROMETHAMINE 30 MG/ML
30 INJECTION, SOLUTION INTRAMUSCULAR; INTRAVENOUS
Status: COMPLETED | OUTPATIENT
Start: 2017-09-18 | End: 2017-09-18

## 2017-09-18 RX ORDER — KETOROLAC TROMETHAMINE 30 MG/ML
30 INJECTION, SOLUTION INTRAMUSCULAR; INTRAVENOUS
Status: DISCONTINUED | OUTPATIENT
Start: 2017-09-18 | End: 2017-09-18

## 2017-09-18 RX ORDER — HYDROXYCHLOROQUINE SULFATE 200 MG/1
200 TABLET, FILM COATED ORAL 2 TIMES DAILY
Status: DISCONTINUED | OUTPATIENT
Start: 2017-09-18 | End: 2017-09-20

## 2017-09-18 RX ORDER — DIAZEPAM 10 MG/2ML
2 INJECTION INTRAMUSCULAR
Status: ACTIVE | OUTPATIENT
Start: 2017-09-18 | End: 2017-09-18

## 2017-09-18 RX ORDER — OXYCODONE AND ACETAMINOPHEN 5; 325 MG/1; MG/1
1 TABLET ORAL
Status: DISCONTINUED | OUTPATIENT
Start: 2017-09-18 | End: 2017-09-20

## 2017-09-18 RX ORDER — HYDROMORPHONE HYDROCHLORIDE 1 MG/ML
1 INJECTION, SOLUTION INTRAMUSCULAR; INTRAVENOUS; SUBCUTANEOUS
Status: DISCONTINUED | OUTPATIENT
Start: 2017-09-18 | End: 2017-09-18

## 2017-09-18 RX ORDER — GABAPENTIN 100 MG/1
100 CAPSULE ORAL 3 TIMES DAILY
Status: DISCONTINUED | OUTPATIENT
Start: 2017-09-18 | End: 2017-09-21

## 2017-09-18 RX ORDER — TOPIRAMATE 25 MG/1
50 TABLET ORAL 2 TIMES DAILY
Status: DISCONTINUED | OUTPATIENT
Start: 2017-09-18 | End: 2017-09-25 | Stop reason: HOSPADM

## 2017-09-18 RX ORDER — MORPHINE SULFATE 2 MG/ML
2 INJECTION, SOLUTION INTRAMUSCULAR; INTRAVENOUS
Status: DISCONTINUED | OUTPATIENT
Start: 2017-09-18 | End: 2017-09-25 | Stop reason: HOSPADM

## 2017-09-18 RX ORDER — BUPROPION HYDROCHLORIDE 150 MG/1
150 TABLET, EXTENDED RELEASE ORAL 2 TIMES DAILY
Status: DISCONTINUED | OUTPATIENT
Start: 2017-09-18 | End: 2017-09-25 | Stop reason: HOSPADM

## 2017-09-18 RX ORDER — IBUPROFEN 400 MG/1
800 TABLET ORAL
Status: DISCONTINUED | OUTPATIENT
Start: 2017-09-18 | End: 2017-09-19

## 2017-09-18 RX ORDER — PREDNISONE 20 MG/1
20 TABLET ORAL
Status: DISCONTINUED | OUTPATIENT
Start: 2017-09-19 | End: 2017-09-19

## 2017-09-18 RX ORDER — BUTALBITAL, ACETAMINOPHEN AND CAFFEINE 50; 325; 40 MG/1; MG/1; MG/1
1 TABLET ORAL
Status: DISCONTINUED | OUTPATIENT
Start: 2017-09-18 | End: 2017-09-25 | Stop reason: HOSPADM

## 2017-09-18 RX ORDER — ESCITALOPRAM OXALATE 10 MG/1
10 TABLET ORAL DAILY
Status: DISCONTINUED | OUTPATIENT
Start: 2017-09-18 | End: 2017-09-25 | Stop reason: HOSPADM

## 2017-09-18 RX ORDER — VANCOMYCIN/0.9 % SOD CHLORIDE 1.5G/250ML
1500 PLASTIC BAG, INJECTION (ML) INTRAVENOUS EVERY 12 HOURS
Status: DISCONTINUED | OUTPATIENT
Start: 2017-09-18 | End: 2017-09-18

## 2017-09-18 RX ORDER — BUPROPION HYDROCHLORIDE 150 MG/1
150 TABLET, EXTENDED RELEASE ORAL 2 TIMES DAILY
COMMUNITY
End: 2019-11-20 | Stop reason: SDUPTHER

## 2017-09-18 RX ORDER — PREDNISONE 20 MG/1
40 TABLET ORAL ONCE
Status: COMPLETED | OUTPATIENT
Start: 2017-09-18 | End: 2017-09-18

## 2017-09-18 RX ORDER — VANCOMYCIN 2 GRAM/500 ML IN 0.9 % SODIUM CHLORIDE INTRAVENOUS
2000 ONCE
Status: COMPLETED | OUTPATIENT
Start: 2017-09-18 | End: 2017-09-18

## 2017-09-18 RX ORDER — POTASSIUM CHLORIDE 7.45 MG/ML
10 INJECTION INTRAVENOUS
Status: COMPLETED | OUTPATIENT
Start: 2017-09-18 | End: 2017-09-18

## 2017-09-18 RX ORDER — ACETAMINOPHEN 500 MG
1000 TABLET ORAL EVERY 8 HOURS
Status: DISCONTINUED | OUTPATIENT
Start: 2017-09-18 | End: 2017-09-18

## 2017-09-18 RX ORDER — BUTALBITAL, ACETAMINOPHEN AND CAFFEINE 50; 325; 40 MG/1; MG/1; MG/1
1 TABLET ORAL
Status: DISCONTINUED | OUTPATIENT
Start: 2017-09-18 | End: 2017-09-18

## 2017-09-18 RX ORDER — ONDANSETRON 2 MG/ML
4 INJECTION INTRAMUSCULAR; INTRAVENOUS
Status: COMPLETED | OUTPATIENT
Start: 2017-09-18 | End: 2017-09-18

## 2017-09-18 RX ORDER — AMOXICILLIN 250 MG
1 CAPSULE ORAL DAILY
Status: DISCONTINUED | OUTPATIENT
Start: 2017-09-18 | End: 2017-09-25 | Stop reason: HOSPADM

## 2017-09-18 RX ORDER — ESCITALOPRAM OXALATE 10 MG/1
10 TABLET ORAL DAILY
COMMUNITY
End: 2019-11-20 | Stop reason: ALTCHOICE

## 2017-09-18 RX ORDER — SODIUM CHLORIDE 0.9 % (FLUSH) 0.9 %
10 SYRINGE (ML) INJECTION
Status: COMPLETED | OUTPATIENT
Start: 2017-09-18 | End: 2017-09-18

## 2017-09-18 RX ADMIN — SODIUM CHLORIDE 1000 ML: 900 INJECTION, SOLUTION INTRAVENOUS at 02:35

## 2017-09-18 RX ADMIN — GABAPENTIN 100 MG: 100 CAPSULE ORAL at 15:28

## 2017-09-18 RX ADMIN — KETOROLAC TROMETHAMINE 30 MG: 30 INJECTION, SOLUTION INTRAMUSCULAR at 03:30

## 2017-09-18 RX ADMIN — POTASSIUM CHLORIDE 10 MEQ: 10 INJECTION, SOLUTION INTRAVENOUS at 10:00

## 2017-09-18 RX ADMIN — MORPHINE SULFATE 2 MG: 2 INJECTION, SOLUTION INTRAMUSCULAR; INTRAVENOUS at 19:09

## 2017-09-18 RX ADMIN — GABAPENTIN 100 MG: 100 CAPSULE ORAL at 22:03

## 2017-09-18 RX ADMIN — BUPROPION HYDROCHLORIDE 150 MG: 150 TABLET, EXTENDED RELEASE ORAL at 17:18

## 2017-09-18 RX ADMIN — PREDNISONE 40 MG: 20 TABLET ORAL at 11:27

## 2017-09-18 RX ADMIN — OXYCODONE HYDROCHLORIDE AND ACETAMINOPHEN 1 TABLET: 5; 325 TABLET ORAL at 17:18

## 2017-09-18 RX ADMIN — Medication 10 ML: at 17:19

## 2017-09-18 RX ADMIN — PIPERACILLIN SODIUM,TAZOBACTAM SODIUM 3.38 G: 3; .375 INJECTION, POWDER, FOR SOLUTION INTRAVENOUS at 05:39

## 2017-09-18 RX ADMIN — IBUPROFEN 800 MG: 400 TABLET, FILM COATED ORAL at 11:27

## 2017-09-18 RX ADMIN — HYDROMORPHONE HYDROCHLORIDE 1 MG: 1 INJECTION, SOLUTION INTRAMUSCULAR; INTRAVENOUS; SUBCUTANEOUS at 04:13

## 2017-09-18 RX ADMIN — SODIUM CHLORIDE 1000 ML: 900 INJECTION, SOLUTION INTRAVENOUS at 04:13

## 2017-09-18 RX ADMIN — IBUPROFEN 800 MG: 400 TABLET, FILM COATED ORAL at 17:18

## 2017-09-18 RX ADMIN — BUPROPION HYDROCHLORIDE 150 MG: 150 TABLET, EXTENDED RELEASE ORAL at 11:27

## 2017-09-18 RX ADMIN — ESCITALOPRAM OXALATE 10 MG: 10 TABLET ORAL at 11:27

## 2017-09-18 RX ADMIN — Medication 10 ML: at 22:04

## 2017-09-18 RX ADMIN — POTASSIUM CHLORIDE 10 MEQ: 10 INJECTION, SOLUTION INTRAVENOUS at 10:13

## 2017-09-18 RX ADMIN — Medication 10 ML: at 13:30

## 2017-09-18 RX ADMIN — Medication 10 ML: at 10:13

## 2017-09-18 RX ADMIN — POTASSIUM CHLORIDE 10 MEQ: 10 INJECTION, SOLUTION INTRAVENOUS at 09:50

## 2017-09-18 RX ADMIN — ONDANSETRON 4 MG: 2 INJECTION INTRAMUSCULAR; INTRAVENOUS at 02:36

## 2017-09-18 RX ADMIN — DOCUSATE SODIUM AND SENNOSIDES 1 TABLET: 8.6; 5 TABLET, FILM COATED ORAL at 10:10

## 2017-09-18 RX ADMIN — TOPIRAMATE 50 MG: 25 TABLET, FILM COATED ORAL at 17:18

## 2017-09-18 RX ADMIN — HYDROMORPHONE HYDROCHLORIDE 1 MG: 1 INJECTION, SOLUTION INTRAMUSCULAR; INTRAVENOUS; SUBCUTANEOUS at 02:36

## 2017-09-18 RX ADMIN — GABAPENTIN 100 MG: 100 CAPSULE ORAL at 10:10

## 2017-09-18 RX ADMIN — MORPHINE SULFATE 2 MG: 2 INJECTION, SOLUTION INTRAMUSCULAR; INTRAVENOUS at 23:06

## 2017-09-18 RX ADMIN — HYDROXYCHLOROQUINE SULFATE 200 MG: 200 TABLET, FILM COATED ORAL at 17:18

## 2017-09-18 RX ADMIN — VANCOMYCIN HYDROCHLORIDE 2000 MG: 10 INJECTION, POWDER, LYOPHILIZED, FOR SOLUTION INTRAVENOUS at 09:07

## 2017-09-18 RX ADMIN — OXYCODONE HYDROCHLORIDE AND ACETAMINOPHEN 1 TABLET: 5; 325 TABLET ORAL at 11:27

## 2017-09-18 RX ADMIN — POTASSIUM CHLORIDE 10 MEQ: 10 INJECTION, SOLUTION INTRAVENOUS at 08:20

## 2017-09-18 RX ADMIN — HYDROXYCHLOROQUINE SULFATE 200 MG: 200 TABLET, FILM COATED ORAL at 10:11

## 2017-09-18 RX ADMIN — PIPERACILLIN SODIUM,TAZOBACTAM SODIUM 3.38 G: 3; .375 INJECTION, POWDER, FOR SOLUTION INTRAVENOUS at 11:36

## 2017-09-18 RX ADMIN — GADOTERIDOL 20 ML: 279.3 INJECTION, SOLUTION INTRAVENOUS at 13:30

## 2017-09-18 RX ADMIN — OXYCODONE HYDROCHLORIDE AND ACETAMINOPHEN 1 TABLET: 5; 325 TABLET ORAL at 22:03

## 2017-09-18 RX ADMIN — TOPIRAMATE 50 MG: 25 TABLET, FILM COATED ORAL at 10:10

## 2017-09-18 NOTE — PROGRESS NOTES
Bedside and Verbal shift change report given to [facility] JOSLYN RN (oncoming nurse) by Sara Saldana (offgoing nurse). Report included the following information SBAR, Kardex, Intake/Output, Recent Results and Med Rec Status.

## 2017-09-18 NOTE — ROUTINE PROCESS
TRANSFER - OUT REPORT:    Verbal report given to Tye Albert RN(name) on David Adhikari  being transferred to (unit) for routine progression of care       Report consisted of patients Situation, Background, Assessment and   Recommendations(SBAR). Information from the following report(s) SBAR, ED Summary, Procedure Summary, MAR and Recent Results was reviewed with the receiving nurse. Lines:       Opportunity for questions and clarification was provided.       Visit Vitals    /74 (BP 1 Location: Left arm, BP Patient Position: At rest)    Pulse (!) 107    Temp 99.5 °F (37.5 °C)    Resp 16    Ht 5' 6\" (1.676 m)    Wt 96.2 kg (212 lb)    SpO2 95%    BMI 34.22 kg/m2

## 2017-09-18 NOTE — PROGRESS NOTES
Pharmacist Note - Vancomycin Dosing    Consult provided for this 27 y.o. female for indication of Sepsis. Antibiotic regimen(s): Zosyn    Recent Labs      17   0228   WBC  15.9*   CREA  0.72   BUN  16     Frequency of BMP: tomorrow x1  Height: 167.6 cm  Weight: 96.2 kg  Est CrCl: >100 ml/min  Temp (24hrs), Av.2 °F (37.9 °C), Min:99.5 °F (37.5 °C), Max:100.8 °F (38.2 °C)    Cultures:   blood - in process    Goal trough = 15 - 20 mcg/mL    Therapy will be initiated with a loading dose of 2000 mg IV x 1 to be followed by a maintenance dose of 1500 mg IV every 12 hours. Pharmacy to follow patient daily and order levels / make dose adjustments as appropriate.

## 2017-09-18 NOTE — PROGRESS NOTES
Admission Medication Reconciliation:    Information obtained from: Patient, [address] [place] [address]    Significant PMH/Disease States:   Past Medical History:   Diagnosis Date    Lupus ([address]     PVC's (premature ventricular contractions)        Chief Complaint for this Admission:  Back pain    Allergies:  Iodine    Prior to Admission Medications:   Prior to Admission Medications   Prescriptions Last Dose Informant Patient Reported? Taking? PREDNISONE PO   Yes Yes   Sig: Take  by mouth daily. On prednisone taper. Started with 60mg x 3 days. Today is last day of 40mg and would start 20mg daily x3 days tomorrow. buPROPion SR (WELLBUTRIN SR) 150 mg SR tablet   Yes Yes   Sig: Take 150 mg by mouth two (2) times a day. escitalopram oxalate (LEXAPRO) 10 mg tablet   Yes Yes   Sig: Take 10 mg by mouth daily. hydroxychloroquine (PLAQUENIL) 200 mg tablet   Yes yes   Sig: Take 200 mg by mouth two (2) times a day. oxyCODONE-acetaminophen (PERCOCET) 5-325 mg per tablet   Yes Yes   Sig: Take 1 Tab by mouth every four (4) hours as needed (migraine). topiramate (TOPAMAX) 50 mg tablet   Yes yes   Sig: Take 50 mg by mouth two (2) times a day. Facility-Administered Medications: None     Comments/Recommendations:   1. Added dose for bupropion  2. Added Lexapro, Percocet  3. Removed Bactroban  4. Pt currently on prednisone taper that started with 60mg daily x3 days. Says today would be last day of  40mg x3 days and would be starting 20mg daily x3 days tomorrow.

## 2017-09-18 NOTE — H&P
History & Physical    Date of admission: 9/18/2017    Patient name: Jun Christian  MRN: [id number]  YOB: 1985  Age: 27 y.o. Primary care provider:  Aspen Gusman MD     Source of Information: patient, ED and medical records                               Chief complaint:  Back/ right flank pain    History of present illness  Jun Christian is a 27 y.o. female with past medical history of lupus, premature ventricular contractions, and obesity presented to the ED from home with chief complaint of back and right flank pain. Symptom onset reportedly began ~ 1 week ago, remained constant, progressively worst, severe, aching involving right flank and mostly lower back, aggravated with movement, without alleviating factors. Patient notes that she was [facility], was prescribed steroids and was given Toradol injection. She notes that she was diagnosed with sciatica. Pain now has increased and radiates down right leg. Patient complained of fever, chills. There were no reports of new onset syncope, loss of consciousness, headache, neck pain, visual disturbance, numbness, paresthesias, focal weakness, chest pain, shortness of breath, palpitations, abdominal pain, nausea, vomiting, diarrhea, melena, dysuria, hematuria, calf pain, increased leg swelling/ edema, rash. Workup in the ED included CT abdomen/ pelvis without contrast which showed no acute process. Patient was given Toradol 30 mg IV, Dilaudid 1 mg IV, Valium 2 mg IV, Zofran 4 mg IV, 0.9% normal saline 1000 ml IV fluid bolus x 2, and started on Zosyn 3.375 grams IV x 1 dose. Patient is now seen for admission to the hospitalist service for continued evaluation and treatments.     Past Medical History:   Diagnosis Date    Lupus ([address]     PVC's (premature ventricular contractions)       Past Surgical History:   Procedure Laterality Date  HX  SECTION  2012    HX ORTHOPAEDIC Bilateral     knee    HX TONSILLECTOMY       Prior to Admission medications    Medication Sig Start Date End Date Taking? Authorizing Provider   predniSONE (DELTASONE) 20 mg tablet Take 1 Tab by mouth daily for 10 days. With Breakfast 17  BECKIE Calderon [facility]) 2 % ointment Apply  to affected area three (3) times daily. Apply to area for 10 days 17   Karen Ashford MD   topiramate (TOPAMAX) 50 mg tablet Take 50 mg by mouth two (2) times a day. Historical Provider   hydroxychloroquine (PLAQUENIL) 200 mg tablet Take 200 mg by mouth two (2) times a day. Jessica Spencer MD   BUPROPION HCL (WELLBUTRIN PO) Take  by mouth. Jessica Spencer MD     Allergies   Allergen Reactions    Iodine Anaphylaxis      FAMILY HISTORY:  Stroke                               Father  Hypertension                    Father  Diabetes mellitus             Father/ brother     Social history  Patient resides  x  Independently                Ambulates  x  Independently                 Alcohol history   x  occasional           Smoking history      x  Former smoker         History   Smoking Status    Former Smoker   Smokeless Tobacco    Never Used       Code status  x  Full code       Review of systems  I performed a ten systems review; pertinent positives were as noted in HPI, otherwise negative. Physical Examination   Visit Vitals    /70    Pulse (!) 102    Temp 98.7 °F (37.1 °C)    Resp 17    Ht 5' 6\" (1.676 m)    Wt 96.2 kg (212 lb)    SpO2 96%    BMI 34.22 kg/m2          O2 Device: Room air    General:  Obese female in no acute respiratory distress   Head:  Normocephalic, without obvious abnormality, atraumatic   Eyes:  Conjunctivae/corneas clear. PERRL, EOMs intact   E/N/M/T: Nares normal. Septum midline.  No nasal drainage or sinus tenderness  Tongue midline/ non-edematous  Clear oropharynx   Neck: Normal appearance and movements, symmetrical, trachea midline  No palpable adenopathy  No thyroid enlargement, tenderness or nodules  No carotid bruit   Normal JVD   Lungs:   Symmetrical chest expansion and respiratory effort  Clear to auscultation bilaterally   Chest wall:  No tenderness or deformity   Heart:  Tachycardic  Regular rhythm   Sounds normal; no murmur, click, rub or gallop   Abdomen:   Soft, no tenderness  No rebound, guarding, or rigidity  Non-distended  Bowel sounds normal  No masses or hepatosplenomegaly  No hernias present   Back: No CVA tenderness   Extremities: Extremities normal, atraumatic  No cyanosis or edema     Pulses 2+ radial/ 1+ DP bilateral pulses   Skin: No rashes or ulcers  Warm/ dry   Musculo-      skeletal: Gait not tested  No calf tenderness   Neuro: GCS 15. Moves all extremities x 4. No slurred speech. No facial droop. Sensation grossly intact. Psych: Alert, oriented x 3           Data Review      24 Hour Results:  Recent Results (from the past 24 hour(s))   CBC WITH AUTOMATED DIFF    Collection Time: 09/18/17  2:28 AM   Result Value Ref Range    WBC 15.9 (H) 3.6 - 11.0 K/uL    RBC 4.04 3.80 - 5.20 M/uL    HGB 12.9 11.5 - 16.0 g/dL    HCT 38.0 35.0 - 47.0 %    MCV 94.1 80.0 - 99.0 FL    MCH 31.9 26.0 - 34.0 PG    MCHC 33.9 30.0 - 36.5 g/dL    RDW 12.7 11.5 - 14.5 %    PLATELET 894 477 - 987 K/uL    NEUTROPHILS 79 (H) 32 - 75 %    LYMPHOCYTES 12 12 - 49 %    MONOCYTES 9 5 - 13 %    EOSINOPHILS 0 0 - 7 %    BASOPHILS 0 0 - 1 %    ABS. NEUTROPHILS 12.5 (H) 1.8 - 8.0 K/UL    ABS. LYMPHOCYTES 2.0 0.8 - 3.5 K/UL    ABS. MONOCYTES 1.4 (H) 0.0 - 1.0 K/UL    ABS. EOSINOPHILS 0.0 0.0 - 0.4 K/UL    ABS.  BASOPHILS 0.0 0.0 - 0.1 K/UL   METABOLIC PANEL, COMPREHENSIVE    Collection Time: 09/18/17  2:28 AM   Result Value Ref Range    Sodium 138 136 - 145 mmol/L    Potassium 3.4 (L) 3.5 - 5.1 mmol/L    Chloride 102 97 - 108 mmol/L    CO2 27 21 - 32 mmol/L    Anion gap 9 5 - 15 mmol/L    Glucose 98 65 - 100 mg/dL    BUN 16 6 - 20 MG/DL    Creatinine 0.72 0.55 - 1.02 MG/DL    BUN/Creatinine ratio 22 (H) 12 - 20      GFR est AA >60 >60 ml/min/1.73m2    GFR est non-AA >60 >60 ml/min/1.73m2    Calcium 8.7 8.5 - 10.1 MG/DL    Bilirubin, total 0.4 0.2 - 1.0 MG/DL    ALT (SGPT) 63 12 - 78 U/L    AST (SGOT) 15 15 - 37 U/L    Alk. phosphatase 80 45 - 117 U/L    Protein, total 7.1 6.4 - 8.2 g/dL    Albumin 3.0 (L) 3.5 - 5.0 g/dL    Globulin 4.1 (H) 2.0 - 4.0 g/dL    A-G Ratio 0.7 (L) 1.1 - 2.2     HCG URINE, QL. - POC    Collection Time: 09/18/17  3:29 AM   Result Value Ref Range    Pregnancy test,urine (POC) NEGATIVE  NEG     URINALYSIS W/MICROSCOPIC    Collection Time: 09/18/17  3:32 AM   Result Value Ref Range    Color YELLOW/STRAW      Appearance CLEAR CLEAR      Specific gravity 1.030 1.003 - 1.030      pH (UA) 6.0 5.0 - 8.0      Protein NEGATIVE  NEG mg/dL    Glucose NEGATIVE  NEG mg/dL    Ketone NEGATIVE  NEG mg/dL    Bilirubin NEGATIVE  NEG      Blood NEGATIVE  NEG      Urobilinogen 0.2 0.2 - 1.0 EU/dL    Nitrites NEGATIVE  NEG      Leukocyte Esterase NEGATIVE  NEG      WBC 0-4 0 - 4 /hpf    RBC 0-5 0 - 5 /hpf    Epithelial cells MANY (A) FEW /lpf    Bacteria 1+ (A) NEG /hpf   URINE CULTURE HOLD SAMPLE    Collection Time: 09/18/17  3:32 AM   Result Value Ref Range    Urine culture hold URINE ON HOLD IN MICROBIOLOGY DEPT FOR 3 DAYS     LACTIC ACID    Collection Time: 09/18/17  5:25 AM   Result Value Ref Range    Lactic acid 1.3 0.4 - 2.0 MMOL/L     Recent Labs      09/18/17 0228   WBC  15.9*   HGB  12.9   HCT  38.0   PLT  293     Recent Labs      09/18/17 0228   NA  138   K  3.4*   CL  102   CO2  27   GLU  98   BUN  16   CREA  0.72   CA  8.7   ALB  3.0*   TBILI  0.4   SGOT  15   ALT  63       Imaging  CT abdomen/ pelvis without contrast:  FINDINGS:  The patient is morbidly obese. LOWER CHEST: The visualized portions of the lung bases are clear.   The absence of intravenous contrast material reduces the sensitivity for  evaluation of the solid parenchymal organs of the abdomen. ABDOMEN:  Liver: The liver is normal in size and contour with no focal abnormality. Gallbladder and bile ducts: There are no calcified stones and there is no  biliary duct dilatation. Spleen: No abnormality. Pancreas: No abnormality. Adrenal glands: No abnormality. Kidneys: No focal parenchymal abnormality. There are 2 small nonobstructing left  renal calculus. PELVIS:  Reproductive organs: The uterus contains an intrauterine device. Bladder: No abnormality. RETROPERITONEUM: The aorta tapers without aneurysm. There is no retroperitoneal  adenopathy or mass. There is no pelvic mass or adenopathy. BOWEL AND MESENTERY: The small bowel is normal. The appendix is not identified. PERITONEUM: There is no ascites or free intraperitoneal air. BONES AND SOFT TISSUES: The bones and soft tissues of the abdominal wall are  within normal limits.     IMPRESSION:   1. No acute abdominal or pelvic abnormality. 2. Small nonobstructing left renal calculi. 3. Intrauterine device.          Assessment and Plan   1. SIRS (systemic inflammatory response syndrome)- fever, leukocytosis, tachycardia. Admit to medical floor. Will continue on Zosyn and Vancomycin for now. Check blood cultures. May adjust antibiotics accordingly. 2.  Fever. Etiology of infection unknown. Rule out possible discitis. As symptoms ongoing since last week, think less likely meningitis. Plan as above. May have Tylenol and/or ice packs prn. 3.  Leukocytosis. Plan as noted. Repeat CBC in a.m.    4.  Acute hypokalemia. Repeat labs/ BMP. If K level is low, then provide K replacement. 5.  Tachycardia. Continue vital sign checks. 6.  Systemic lupus erythematosis (SLE). May be flare. Was not improved with recent steroids. 7. VTE prophylaxis. SCDs to BLEs.              Signed by: Yohana Chen MD    September 18, 2017 at 8:31 AM

## 2017-09-18 NOTE — PROGRESS NOTES
Care Management Interventions  PCP Verified by CM: Yes  Mode of Transport at Discharge: Other (see comment) (private vehicle)  Discharge Durable Medical Equipment: No  Physical Therapy Consult: Yes  Occupational Therapy Consult: Yes  Speech Therapy Consult: No  Current Support Network:  (independent with ADLs)  Confirm Follow Up Transport: Self  Freedom of Choice Offered: Yes  Discharge Location  Discharge Placement: Home    CM reviewed chart. Pt was independent with ADLs and IADLs prior to admission. Pt has supportive friends and family. Pt's PCP is Dr. Heather Mace and she sees a Rhuematologist at [facility]. Pt usually gets her prescriptions filled at her local [place] or [facility]. Plan is to return home at time of discharge. CM will continue to follow incase any needs arise.   Ada Dempsey, BSW, ACM

## 2017-09-18 NOTE — CONSULTS
Full note dictated. Briefly, 28yo with lumbar epidural abscess. Neurologically intact. She is complaining of neck pain and upper thoracic pain. I will order an MRI of these regions. Further recommendations pending those results. Thank you for this consultation.

## 2017-09-18 NOTE — CONSULTS
[address] [facility] [address] [facility]   [address] [facility]       Name:  Jina Stevens   MR#:  [id number]   :  1985   Account #:  [de-identified]    Date of Consultation:  2017   Date of Adm:  2017       REASON FOR CONSULTATION: Lumbar epidural abscess. HISTORY OF PRESENT ILLNESS: This is a 22-year-old woman who   started having back pain a little over a week ago. She did not know of   any accident or injury associated with her back pain. It radiated into her   right flank and then down into her right leg. She did go to an outpatient   Urgent care where she was given steroids and a Toradol injection   without any improvement of her symptoms. When her pain became   severe and she started having fever and chills, she came to the   emergency room. She says the fever and chills have been going on for   approximately 2 days. She has not had any bowel or bladder   incontinence. She is also noticing increasing cervical pain with pain   that extends into her occipital region and down into her upper thoracic   region. She does not have any radicular pain into her arms. No   numbness or tingling. PAST MEDICAL HISTORY: Lupus, cardiac arrhythmia, obesity. SURGICAL HISTORY:  section, knee arthroscopy,   tonsillectomy, wisdom teeth removed. SOCIAL HISTORY: She is . She has 2 children, ages 6 and   11. She is a , teaching reading. No tobacco use. Occasional alcohol use. No IV drug use. ALLERGIES: IODINE. MEDICATIONS PRIOR TO ADMISSION:   1. Prednisone 20 mg taper. 2. Topamax 50 mg b.i.d.   3. Plaquenil 200 mg t.i.d.   4. Wellbutrin daily. ANTIBIOTICS: She was started on Zosyn and vancomycin after   admission. FAMILY HISTORY: Stroke, hypertension, diabetes. REVIEW OF SYSTEMS: Positive for headache.  Denies vision   changes, hearing difficulty, chest pain, shortness of breath, abdominal   pain, urinary dysfunction, numbness, muscle spasm, or weakness. No   skin eruption. PHYSICAL EXAMINATION:   VITAL SIGNS: Temperature 100.8 on admission, currently 99.3, blood   pressure 113/75, pulse 100. GENERAL: This is a well-developed, obese woman who is examined   lying in a hospital bed. NEUROLOGIC: She is alert. She is oriented x3. Normal affect. Fluent   speech. Conjugate gaze. Symmetric face. No drift. Full strength in   bilateral upper extremities, deltoids, biceps, triceps, wrist extensors,   hand intrinsics. Full strength in bilateral lower extremities, hip flexors,   quadriceps, tibialis anterior, gastrocnemius, EHL. Reflexes 2+ in   bilateral upper extremities. Negative Eugenia sign; 2+ bilateral   patella. LABORATORY STUDIES: White count 15.9. Coagulation studies not   performed. IMAGING: She has an MRI of the lumbar spine that shows an epidural   abscess from L2 to L5. It is thickest diameter at L3-L4. It is off to the   right causing displacement of the thecal sac and its thickest diameter is   approximately 1 cm. ASSESSMENT AND PLAN: This is a 49-year-old woman who has a   lumbar epidural abscess. Origin is unclear, though she does mention   that she had a cyst on her face that is healing and this was   approximately 3 weeks ago. She does not use IV drugs. She has been   started on Zosyn and vancomycin. Neurologically, she is intact. I am   concerned regarding her cervical pain that she may have an additional   area of abscess. I am going to order an MRI of her cervical spine as   well as thoracic spine to evaluate the entire spinal axis and further   recommendations will be pending those studies. Thank you for this consultation.         MD Marvin Almaguer / Justin Iyer   D:  09/18/2017   18:33   T:  09/18/2017   19:07   Job #:  [id number]

## 2017-09-18 NOTE — IP AVS SNAPSHOT
[address] 
[phone] Patient: Anuj Robles MRN: [id number] RDA:7/4/4400 Current Discharge Medication List  
  
START taking these medications Dose & Instructions Dispensing Information Comments Morning Noon Evening Bedtime  
 cyclobenzaprine 10 mg tablet Commonly known as:  FLEXERIL Your last dose was: Your next dose is:    
   
   
 Dose:  10 mg Take 1 Tab by mouth three (3) times daily as needed for Muscle Spasm(s). Quantity:  30 Tab Refills:  0  
     
   
   
   
  
 gabapentin 100 mg capsule Commonly known as:  NEURONTIN Your last dose was: Your next dose is:    
   
   
 Dose:  200 mg Take 2 Caps by mouth three (3) times daily. Quantity:  180 Cap Refills:  1 L. acidoph & paracasei- S therm- Bifido 8 billion cell Cap cap Commonly known as:  ELLEN-Q/RISAQUAD Your last dose was: Your next dose is:    
   
   
 Dose:  1 Cap Take 1 Cap by mouth daily. Quantity:  30 Cap Refills:  1  
     
   
   
   
  
 senna-docusate 8.6-50 mg per tablet Commonly known as:  Josey Fresh Your last dose was: Your next dose is:    
   
   
 Dose:  1 Tab Take 1 Tab by mouth daily as needed for Constipation. Quantity:  30 Tab Refills:  0 CONTINUE these medications which have CHANGED Dose & Instructions Dispensing Information Comments Morning Noon Evening Bedtime buPROPion  mg SR tablet Commonly known as:  Krystal Randall What changed:  Another medication with the same name was removed. Continue taking this medication, and follow the directions you see here. Your last dose was: Your next dose is:    
   
   
 Dose:  150 mg Take 150 mg by mouth two (2) times a day. Refills:  0 CONTINUE these medications which have NOT CHANGED Dose & Instructions Dispensing Information Comments Morning Noon Evening Bedtime LEXAPRO 10 mg tablet Generic drug:  escitalopram oxalate Your last dose was: Your next dose is:    
   
   
 Dose:  10 mg Take 10 mg by mouth daily. Refills:  0  
     
   
   
   
  
 oxyCODONE-acetaminophen 5-325 mg per tablet Commonly known as:  PERCOCET Your last dose was: Your next dose is:    
   
   
 Dose:  1 Tab Take 1 Tab by mouth every four (4) hours as needed (migraine). Max Daily Amount: 6 Tabs. Quantity:  20 Tab Refills:  0  
     
   
   
   
  
 topiramate 50 mg tablet Commonly known as:  TOPAMAX Your last dose was: Your next dose is:    
   
   
 Dose:  50 mg Take 50 mg by mouth two (2) times a day. Refills:  0 STOP taking these medications PLAQUENIL 200 mg tablet Generic drug:  hydroxychloroquine  
   
  
 predniSONE 20 mg tablet Commonly known as:  DELTASONE  
   
  
 PREDNISONE PO Where to Get Your Medications Information on where to get these meds will be given to you by the nurse or doctor. ! Ask your nurse or doctor about these medications  
  cyclobenzaprine 10 mg tablet  
 gabapentin 100 mg capsule L. acidoph & paracasei- S therm- Bifido 8 billion cell Cap cap  
 oxyCODONE-acetaminophen 5-325 mg per tablet  
 senna-docusate 8.6-50 mg per tablet

## 2017-09-18 NOTE — IP AVS SNAPSHOT
[address] 
[phone] Patient: Мария Aparicio MRN: [id number] IXT:[id number]/[id number] You are allergic to the following Allergen Reactions Iodine Anaphylaxis Recent Documentation Height Weight BMI OB Status Smoking Status 1.676 m 96.2 kg 34.22 kg/m2 IUD Former Smoker Unresulted Labs Order Current Status CULTURE, BLOOD Preliminary result CULTURE, BLOOD Preliminary result CULTURE, BLOOD, PAIRED Preliminary result Emergency Contacts Name Discharge Info Relation Home Work Mobile Debbie Keene DISCHARGE CAREGIVER [3] Mother [14]   [phone] About your hospitalization You were admitted on:  September 18, 2017 You last received care in the:  [address] [place] [address] You were discharged on:  September 25, 2017 Unit phone number:  [phone] Why you were hospitalized Your primary diagnosis was:  Sirs Due To Infectious Process Without Acute Organ Dysfunction (Hcc) Your diagnoses also included:  Hypokalemia, Intractable Back Pain, Obesity (Bmi 30-39.9), Bacteriuria Providers Seen During Your Hospitalizations Provider Role Specialty Primary office phone Claude Castilla, MD Attending Provider Emergency Medicine [phone] Kenyatta Alvarez MD Attending Provider [facility] [phone] Dilan Spence MD Attending Provider Internal Medicine [phone] Your Primary Care Physician (PCP) Primary Care Physician Office Phone Office Fax Odalys Rivera 8 [phone] Follow-up Information Follow up With Details Comments Contact Info Shaq Aaron MD   [address] 
[phone] Juan Ulloa NP   [facility] [address] 
[phone]  Skye Stewart MD Schedule an appointment as soon as possible for a visit in 1 week post-op appointment, wound re-check, suture removal [address] 
[phone] Alonzo Olivares MD In 2 weeks  217 [facility] [address] [facility] Internal Medicine [address] 
[phone] [address] [place] [address] On 9/26/2017 Please call agency if you have not heard anything by 11:00am the day after discharge. [facility] 
1st Floor [facility] [address] 
[phone] Cristiano 40 On 9/25/2017 Home infusion services 2801 [facility]. 1200 [facility] [address] [phone] Your Appointments Tuesday September 26, 2017 To Be Determined PT EVALUATION with DIONNE Salinas 39 (1 [facility]) Jose G 39 (1 [facility]) Tuesday September 26, 2017 To Be Determined START OF CARE with NAYAN Flores 39 (1 [facility]) Jose G 39 (1 [facility]) Current Discharge Medication List  
  
START taking these medications Dose & Instructions Dispensing Information Comments Morning Noon Evening Bedtime  
 cyclobenzaprine 10 mg tablet Commonly known as:  FLEXERIL Your last dose was: Your next dose is:    
   
   
 Dose:  10 mg Take 1 Tab by mouth three (3) times daily as needed for Muscle Spasm(s). Quantity:  30 Tab Refills:  0  
     
   
   
   
  
 gabapentin 100 mg capsule Commonly known as:  NEURONTIN Your last dose was: Your next dose is:    
   
   
 Dose:  200 mg Take 2 Caps by mouth three (3) times daily. Quantity:  180 Cap Refills:  1 L. acidoph & paracasei- S therm- Bifido 8 billion cell Cap cap Commonly known as:  ELLEN-Q/RISAQUAD Your last dose was: Your next dose is: Dose:  1 Cap Take 1 Cap by mouth daily. Quantity:  30 Cap Refills:  1  
     
   
   
   
  
 senna-docusate 8.6-50 mg per tablet Commonly known as:  Edith Greenfields Your last dose was: Your next dose is:    
   
   
 Dose:  1 Tab Take 1 Tab by mouth daily as needed for Constipation. Quantity:  30 Tab Refills:  0 CONTINUE these medications which have CHANGED Dose & Instructions Dispensing Information Comments Morning Noon Evening Bedtime buPROPion  mg SR tablet Commonly known as:  Meño Donaldson What changed:  Another medication with the same name was removed. Continue taking this medication, and follow the directions you see here. Your last dose was: Your next dose is:    
   
   
 Dose:  150 mg Take 150 mg by mouth two (2) times a day. Refills:  0 CONTINUE these medications which have NOT CHANGED Dose & Instructions Dispensing Information Comments Morning Noon Evening Bedtime LEXAPRO 10 mg tablet Generic drug:  escitalopram oxalate Your last dose was: Your next dose is:    
   
   
 Dose:  10 mg Take 10 mg by mouth daily. Refills:  0  
     
   
   
   
  
 oxyCODONE-acetaminophen 5-325 mg per tablet Commonly known as:  PERCOCET Your last dose was: Your next dose is:    
   
   
 Dose:  1 Tab Take 1 Tab by mouth every four (4) hours as needed (migraine). Max Daily Amount: 6 Tabs. Quantity:  20 Tab Refills:  0  
     
   
   
   
  
 topiramate 50 mg tablet Commonly known as:  TOPAMAX Your last dose was: Your next dose is:    
   
   
 Dose:  50 mg Take 50 mg by mouth two (2) times a day. Refills:  0 STOP taking these medications PLAQUENIL 200 mg tablet Generic drug:  hydroxychloroquine  
   
  
 predniSONE 20 mg tablet Commonly known as:  DELTASONE  
   
  
 PREDNISONE PO  
   
 Where to Get Your Medications Information on where to get these meds will be given to you by the nurse or doctor. ! Ask your nurse or doctor about these medications  
  cyclobenzaprine 10 mg tablet  
 gabapentin 100 mg capsule L. acidoph & paracasei- S therm- Bifido 8 billion cell Cap cap  
 oxyCODONE-acetaminophen 5-325 mg per tablet  
 senna-docusate 8.6-50 mg per tablet Discharge Instructions After Hospital Care Plan:  Discharge Instructions Lumbar Surgery Dr. Morena Barbosa Patient Name: Mariposa Pastrana Date of procedure: 9/19/2017 Date of discharge:  
 
Procedure: Procedure(s): SPINE LUMBAR LAMINECTOMY L3-L4 WITH EVACUATION OF ABSCESS    
 
PCP: Mateo Covington MD 
 
Follow up appointments 
-Follow up with Dr. Morena Barbosa in 2 weeks. Call [phone] to make an appointment as soon as you get home from the hospital. 
 
When to call your Spine Surgeon: 
-Signs of infection-if your incision is red; continues to have drainage; drainage has a foul odor or if you have a persistent fever over 101 degrees for 24 hours 
-Nausea or vomiting, severe headache 
-Loss of bowel or bladder function, inability to urinate 
-Changes in sensation in your arms or legs (numbness, tingling, loss of color) -Increased weakness-greater than before your surgery 
-Severe pain or pain not relieved by medications 
-Signs of a blood clot in your leg-calf pain, tenderness, redness, swelling of lower leg When to call your Primary Care Physician: 
-Concerns about medical conditions such as diabetes, high blood pressure, asthma, congestive heart failure 
-Call if blood sugars are elevated, persistent headache or dizziness, coughing or congestion, constipation or diarrhea, burning with urination, abnormal heart rate When to call 911 and go to the nearest emergency room: 
-Acute onset of chest pain, shortness of breath, difficulty breathing Activity - You are going home a well person, be as active as possible. Your only exercise should be walking. Start with short frequent walks and increase your walking distance each day. 
-Limit the amount of time you sit to 20-30 minute intervals. Sitting for prolonged periods of time will be uncomfortable for you following surgery. 
-Do NOT lift anything over 5 pounds 
-Do NOT do any straining, twisting or bending 
-When you are in bed, you may lay on your back or on either side. Do NOT lie on your stomach Diet 
-Resume usual diet; drink plenty of fluids; eat foods high in fiber 
-It is important to have regular bowel movements. Pain medications may cause constipation. You may want to take a stool softener (such as Senokot-S or Colace) to prevent constipation. 
-If constipation occurs, take a laxative (such as Dulcolax tablets, Milk of Magnesia, or a suppository). Laxatives should only be used if the above preventable measures have failed and you still have not had a bowel movement after three days Driving 
-You may not drive or return to work until instructed by your physician. However, you may ride in the car for short periods of time. Incision Care 
-You may take brief showers but do not run the water run directly onto the wound. After showering or bathing, remove the wet dressing and gently blot the wound dry with a soft towel. 
-Do not rub or apply any lotions or ointments to your incision site.  
-Do not soak or scrub your wound 
-Keep a dry dressing (ABD and paper tape) on your incision and have it changed daily for 14 days after surgery; more often if your incision is draining. Have your caregiver wash their hands thoroughly before changing your dressing. 
-You will have absorbable sutures and steristrips (white tape) on your incision. Leave the steristrips on until they fall off. Showering 
-You may shower in approximately 2 days after your surgery. -Leave the dressing on during your shower. Do NOT allow the water to run directly onto your dressing. Once you get out of the shower, put on a dry dressing. 
-Reminder- your brace can be removed while showering. Remember to not bend or twist while your brace is off.   
-Do not take a tub bath. Preventing blood clots 
-You have been given T.E.D. stockings to wear. Continue to wear these for 7 days after your discharge. Put them on in the morning and take them off at night.   
-They are used to increase your circulation and prevent blood clots from forming in your legs 
-T. E.D. stockings can be machine washed, temperature not to exceed 160° F (71°C) and machine dried for 15 to 20 minutes, temperature not to exceed 250° F (121°C). Pain management 
-Take pain medication as prescribed; decrease the amount you use as your pain lessens 
-Do not wait until you are in extreme pain to take your medication. 
-Avoid alcoholic beverages while taking pain medication Pain Medication Safety DO: 
-Read the Medication Guide  
-Take your medicine exactly as prescribed  
-Store your medicine away from children and in a safe place  
-Flush unused medicine down the toilet  
-Call your healthcare provider for medical advice about side effects. You may report side effects to FDA at 1-564-FDA-8626.  
-Please be aware that many medications contain Tylenol. We do not want you to over medicate so please read the information below as a guide. Do not take more than 4 Grams of Tylenol in a 24 hour period. (There are 1000 milligrams in one Gram) Percocet contains 325 mg of Tylenol per tablet (do not take more than 12 tablets in 24 hours) Lortab contains 500 mg of Tylenol per tablet (do not take more than 8 tablets in 24 hours) Norco contains 325 mg of Tylenol per tablet (do not take more than 12 tablets in 24 hours). DO NOT: 
-Do not give your medicine to others  
-Do not take medicine unless it was prescribed for you  
-Do not stop taking your medicine without talking to your healthcare provider  
-Do not break, chew, crush, dissolve, or inject your medicine. If you cannot  swallow your medicine whole, talk to your healthcare provider. 
-Do not drink alcohol while taking this medicine 
-Do not take anti-inflammatory medications or aspirin unless instructed by your  Physician. Discharge Instructions PATIENT ID: Tj Joel MRN: [id number] YOB: 1985 DATE OF ADMISSION: 9/18/2017  1:45 AM   
DATE OF DISCHARGE: 9/25/2017 PRIMARY CARE PROVIDER: David Sharma MD  
 
ATTENDING PHYSICIAN: Jena Resendiz MD 
DISCHARGING PROVIDER: Jena Resendiz MD   
To contact this individual call [phone] and ask the  to page. If unavailable ask to be transferred the Adult Hospitalist Department. DISCHARGE DIAGNOSES Epidural abscess Sepsis CONSULTATIONS: IP CONSULT TO INFECTIOUS DISEASES 
IP CONSULT TO NEUROSURGERY 
IP CONSULT TO CARDIOLOGY PROCEDURES/SURGERIES: Procedure(s): SPINE LUMBAR LAMINECTOMY L3-L4 WITH EVACUATION OF ABSCESS PENDING TEST RESULTS:  
At the time of discharge the following test results are still pending: none FOLLOW UP APPOINTMENTS:  
Follow-up Information Follow up With Details Comments Contact Info David Sharma MD   [address] 
[phone] Tasia Charles NP   [address] 7 [address] 
[phone] Tono Palacios MD Schedule an appointment as soon as possible for a visit in 1 week post-op appointment, wound re-check, suture removal 935 [facility]. [address] [place] [address] 
[phone] Julia Marshall MD In 2 weeks  217 [facility] [address] [facility] Internal Medicine [address] 
[phone] ADDITIONAL CARE RECOMMENDATIONS:  
1) Care of PICC 2) VAncomycin 1500mg IVPB Every 8 hrs until 11/2/17 Adjust dose according to vanco trough between 15-20 3) Draw  Basic metabolic panel and VAnco trough  Every Thursday and Monday 4) CBC/ESR/LFT  every Monday 5) FAx results to Jody Ro  6) Call Joyd Ro with any critical value- [phone] Follow up with Dr Sky Solorzano in 2 weeks Follow up with Dr Carlotta Ku as above DIET: Cardiac Diet ACTIVITY: Activity as tolerated DISCHARGE MEDICATIONS: 
 See Medication Reconciliation Form · It is important that you take the medication exactly as they are prescribed. · Keep your medication in the bottles provided by the pharmacist and keep a list of the medication names, dosages, and times to be taken in your wallet. · Do not take other medications without consulting your doctor. NOTIFY YOUR PHYSICIAN FOR ANY OF THE FOLLOWING:  
Fever over 101 degrees for 24 hours. Chest pain, shortness of breath, fever, chills, nausea, vomiting, diarrhea, change in mentation, falling, weakness, bleeding. Severe pain or pain not relieved by medications. Or, any other signs or symptoms that you may have questions about. DISPOSITION: 
x  Home With: 
 OT  PT  [place]  RN  
  
 SNF/Inpatient Rehab/LTAC Independent/assisted living Hospice Other: CDMP Checked:  
Yes x PROBLEM LIST Updated: 
Yes x Signed:  
Kari Allen MD 
9/25/2017 
3:57 PM 
 
 
 
Discharge Orders Procedure Order Date Status Priority Quantity Spec Type Associated Dx MRI ABD WO CONT 09/18/17 0508 Future STAT 1  Fever, unspecified fever cause [[id number]] Questions: Is Patient Allergic to Contrast Dye?:  Yes   Is Patient Pregnant?:  No  
        
 MRI LUMB SPINE WO CONT 09/18/17 0508 Future STAT 1  Fever, unspecified fever cause [[id number]] Questions: Is Patient Allergic to Contrast Dye?:  Yes Is Patient Pregnant?:  No  
        
  
[facility] Announcement We are excited to announce that we are making your provider's discharge notes available to you in [facility]. You will see these notes when they are completed and signed by the physician that discharged you from your recent hospital stay. If you have any questions or concerns about any information you see in [facility], please call the Health Information Department where you were seen or reach out to your Primary Care Provider for more information about your plan of care. Introducing [facility]! Zahira Rivera introduces [facility] patient portal. Now you can access parts of your medical record, email your doctor's office, and request medication refills online. 1. In your internet browser, go to [email or web address] 2. Click on the First Time User? Click Here link in the Sign In box. You will see the New Member Sign Up page. 3. Enter your [facility] Access Code exactly as it appears below. You will not need to use this code after youve completed the sign-up process. If you do not sign up before the expiration date, you must request a new code. · [facility] Access Code: [email or web address] Expires: 11/24/2017 12:28 PM 
 
4. Enter the last four digits of your Social Security Number (xxxx) and Date of Birth (mm/dd/yyyy) as indicated and click Submit. You will be taken to the next sign-up page. 5. Create a [employer]ot ID. This will be your [facility]lan login ID and cannot be changed, so think of one that is secure and easy to remember. 6. Create a [facility] password. You can change your password at any time. 7. Enter your Password Reset Question and Answer. This can be used at a later time if you forget your password. 8. Enter your e-mail address. You will receive e-mail notification when new information is available in [address] 9. Click Sign Up. You can now view and download portions of your medical record. 10. Click the Download Summary menu link to download a portable copy of your medical information. If you have questions, please visit the Frequently Asked Questions section of the [employer] website. Remember, [employer] is NOT to be used for urgent needs. For medical emergencies, dial 911. Now available from your iPhone and Android! General Information Please provide this summary of care documentation to your next provider. Patient Signature:  ____________________________________________________________ Date:  ____________________________________________________________  
  
[facility] Provider Signature:  ____________________________________________________________ Date:  ____________________________________________________________

## 2017-09-18 NOTE — PROGRESS NOTES
ID  Full consult note to follow  27 yr female with h/o SLE on plaquenil presents with 10 day h/o pain rt side of the back and radiating rt buttock/ In July her 11 yr old son jumped on her back  In sept had a n abscess on the face and took bactrim  MRI shows L5-s1 fact joint infection with extensive epidural abscess from L2-L5.   Hold off antibiotic  Needs drainage of pus for cultures ( bacteria/fungal/Afb)  Neurosurgery following her- will discuss with them

## 2017-09-18 NOTE — PROGRESS NOTES
Hospitalist Progress Note  Office: [phone]      Date of Service:  2017  NAME:  Jazmyn Murdock  :  1985  MRN:  [id number]      Admission Summary:   26 yo woman with lupus and obesity presented to the ED from home on 17 with back pain radiating to the right buttock and right leg. She was diagnosed with sciatica on 17. She was admitted inpatient medical floor for SIRS. Interval history / Subjective:   Still c/o lower back pain radiating to the right buttock and right leg; c/o headache; states pain meds given in ED are wearing off; Tmax 100.8F, tachycardic; brother at bedside     Assessment & Plan:     SIRS with suspected infectious etiology and no organ dysfunction (POA)  - fever, tachycardia, leucocytosis on admission  - lactic acid WNL on admission  - CT abd/pelvis  nonobstructing L kidney stone otherwise no acute  - DDx viral infection, UTI, discitis, epidural abscess, lumbar OM  - had flu vaccine already this season  - BCx  pending  - UA with bacteriuria, epithelials but no LE/nitrites likely contamination; check UCx   - empiric vanc/Zosyn started ; de-escalate prn  - check MRI L-spine r/o OM, discitis, abscess  - add on lipase although unlikely pancreatitis    Back pain with lumbar radiculopathy and recent dx sciatica (POA)  - pain control with scheduled TID ibuprofen, lidocaine patch, gabapentin; prn Percocet  - check MRI L-spine r/o OM, discitis, abscess  - [facility]  and last had Percocet 5/325 #20 ordered on 17 by PCP  - dx sciatica recently at [facility], sent home with prednisone but no improvement  - PT/OT evals    Lupus   - resume home Plaquenil  - Rheum is Dr Naomy [facility] at [facility]    Obesity, Body mass index is 34.22 kg/(m^2).      Hypokalemia (POA) - replete prn    Code status: full  DVT prophylaxis: SCDs    Care Plan discussed with: Patient/Family, Nurse and Case Manager  Disposition: Home with family when stable     Hospital Problems  Date Reviewed: 9/18/2017          Codes Class Noted POA    Hypokalemia ICD-10-CM: E87.6  ICD-9-CM: 276.8  9/18/2017 Unknown        Intractable back pain ICD-10-CM: M54.9  ICD-9-CM: 724.5  9/18/2017 Unknown        * (Principal)SIRS (systemic inflammatory response syndrome) ([facility] [address] ICD-10-CM: R65.10  ICD-9-CM: 995.90  9/18/2017 Yes        Obesity (BMI 30-39.9) (Chronic) ICD-10-CM: E66.9  ICD-9-CM: 278.00  9/18/2017 Yes        Bacteriuria ICD-10-CM: R82.71  ICD-9-CM: 791.9  9/18/2017 Yes            Review of Systems:   Pertinent items are noted in HPI. Vital Signs:    Last 24hrs VS reviewed since prior progress note.  Most recent are:  Visit Vitals    /70    Pulse (!) 102    Temp 98.7 °F (37.1 °C)    Resp 17    Ht 5' 6\" (1.676 m)    Wt 96.2 kg (212 lb)    SpO2 96%    BMI 34.22 kg/m2     No intake or output data in the 24 hours ending 09/18/17 0851     Physical Examination:     Constitutional:  awake, no acute distress, cooperative, pleasant, tearful, obese   ENT:  Oral mucosa moist, oropharynx benign  Neck supple, no masses   Resp:  CTA bilaterally, no wheezing/rhonchi/rales   CV:  regular rhythm, tachycardic, no m/r/g appreciated, no peripheral edema, +pulses    GI:  +BS, soft, non distended, non tender, obese     Musculoskeletal:  moves all extremities but reduced ROM b/l LEs; straight leg raise + on right    Neurologic:  AAOx3, NFD     Skin:  warm, dry  Eyes:  PERRL    Data Review:    Review and/or order of clinical lab test  Review and/or order of tests in the radiology section of CPT  Review and/or order of tests in the medicine section of CPT    Labs:     Recent Labs      09/18/17 0228   WBC  15.9*   HGB  12.9   HCT  38.0   PLT  293     Recent Labs      09/18/17 0228   NA  138   K  3.4*   CL  102   CO2  27   BUN  16   CREA  0.72   GLU  98   CA  8.7     Recent Labs      09/18/17   0228   SGOT  15   ALT  63   AP  80   TBILI 0.4   TP  7.1   ALB  3.0*   GLOB  4.1*     No results for input(s): INR, PTP, APTT in the last 72 hours. No lab exists for component: INREXT   No results for input(s): FE, TIBC, PSAT, FERR in the last 72 hours. No results found for: FOL, RBCF   No results for input(s): PH, PCO2, PO2 in the last 72 hours. No results for input(s): CPK, CKNDX, TROIQ in the last 72 hours.     No lab exists for component: CPKMB  No results found for: CHOL, CHOLX, CHLST, CHOLV, HDL, LDL, LDLC, DLDLP, TGLX, TRIGL, TRIGP, CHHD, CHHDX  No results found for: [facility]  Lab Results   Component Value Date/Time    Color YELLOW/STRAW 09/18/2017 03:32 AM    Appearance CLEAR 09/18/2017 03:32 AM    Specific gravity 1.030 09/18/2017 03:32 AM    Specific gravity 1.024 03/20/2015 02:41 PM    pH (UA) 6.0 09/18/2017 03:32 AM    Protein NEGATIVE  09/18/2017 03:32 AM    Glucose NEGATIVE  09/18/2017 03:32 AM    Ketone NEGATIVE  09/18/2017 03:32 AM    Bilirubin NEGATIVE  09/18/2017 03:32 AM    Urobilinogen 0.2 09/18/2017 03:32 AM    Nitrites NEGATIVE  09/18/2017 03:32 AM    Leukocyte Esterase NEGATIVE  09/18/2017 03:32 AM    Epithelial cells MANY 09/18/2017 03:32 AM    Bacteria 1+ 09/18/2017 03:32 AM    WBC 0-4 09/18/2017 03:32 AM    RBC 0-5 09/18/2017 03:32 AM     Medications Reviewed:     Current Facility-Administered Medications   Medication Dose Route Frequency    diazePAM (VALIUM) injection 2 mg  2 mg IntraVENous NOW    sodium chloride (NS) flush 5-10 mL  5-10 mL IntraVENous Q8H    sodium chloride (NS) flush 5-10 mL  5-10 mL IntraVENous PRN    piperacillin-tazobactam (ZOSYN) 3.375 g in 0.9% sodium chloride (MBP/ADV) 100 mL  3.375 g IntraVENous Q8H    vancomycin (VANCOCIN) 2000 mg in  ml infusion  2,000 mg IntraVENous ONCE    potassium chloride 10 mEq in 100 ml IVPB  10 mEq IntraVENous Q1H    Vancomycin - Pharmacy to Dose   Other Rx Dosing/Monitoring    vancomycin (VANCOCIN) 1500 mg in  ml infusion  1,500 mg IntraVENous Q12H    ketorolac (TORADOL) injection 30 mg  30 mg IntraVENous Q6H PRN    HYDROmorphone (PF) (DILAUDID) injection 1 mg  1 mg IntraVENous Q6H PRN     ______________________________________________________________________  EXPECTED LENGTH OF STAY: - - -  ACTUAL LENGTH OF STAY:          John Coburn MD Clear

## 2017-09-18 NOTE — PROGRESS NOTES
Spiritual Care Partner Volunteer visited patient in 2N on 9/18/17. Documented by:  Patti Padilla M.Div.    Paging Service [phone])

## 2017-09-18 NOTE — ED TRIAGE NOTES
Sat a week ago I started with sciatica  pain in my R buttocks. Pain has increased to the point I can't get comfortable in any position. Pain travels down my R leg.

## 2017-09-18 NOTE — PROGRESS NOTES
Bedside shift change report given to Vilma Parks RN (oncoming nurse) by Gonzales Rey RN (offgoing nurse). Report included the following information SBAR, Kardex, Intake/Output, MAR and Recent Results.

## 2017-09-18 NOTE — PROGRESS NOTES
TRANSFER - IN REPORT:    Verbal report received from [facility] CLEAR [place]) on Antonia Walker  being received from ED(unit) for routine progression of care      Report consisted of patients Situation, Background, Assessment and   Recommendations(SBAR). Information from the following report(s) SBAR, Kardex, ED Summary and MAR was reviewed with the receiving nurse. Opportunity for questions and clarification was provided. Assessment completed upon patients arrival to unit and care assumed.

## 2017-09-18 NOTE — CONSULTS
ID Consult Note    NAME:  Mariposa Pastrana                    Requesting Provider  Sindhu Loyola                          :   1985                      DOA  MRN:   [id number]   Date/Time:  2017 5:45 PM  Subjective:   REASON FOR CONSULT:   Epidural abscess    HPI/Hospital course   Aj Morfin is a 27 y. o. with a history of SLE followed at [facility] on [facility] presents with back pain for more than a week. Pain radiating down rt buttock X 1 week, Fever X 2 days  Pt says she developed severe low back pain more on the right side 10 days ago and was taking diclofenac. The pain was radiating down her rt buttock. she had gone to [facility] on  and was given prednisone 20mg  and sent home. She went to her PCP who asked her to increase the steroids. She returned to ED on  as she was not getting better. She also started having fever and chills. In the ED she had spinal xray , wbc was > 16 and had fever. She later had MRI which revealed Extensive epidural abscess from the L2 level to the mid L5 in the posterior right spinal canal, likely originating from inflammatory changes in the right L5-S1 facet joint. She was seen by neurosurgeon who asked for MRI of the thoracic and cervical spine because eof pain. Pt was in [facility] in 2017 with back pain after her 11 yr old son jumped on her back. She had Xray lumbar spine which was normal. She was asked to take analgesics. 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 She says she had a face cyst 3 weeks ago and it was squeezed and purulent discharge was obtained and her PCP gave her bactrim with near resolution.           Data  this hospitalization  Date  TMAX WBC Abn labs Intervention  Cultures ABX    100.8 15.9 K 3.4  ALT 63  AST 15  BC                                                                     Medical history  Lupus  Migraine    PSH- csection  Tonsillectomy  Knee surgery    SOCX- non smoker  Lives with 2 children and her partner    [facility]- CVA/ HTN - father  DM-brother    ALLERGY - iodine - anaphylaxis  Medications  Prenisone  Bupropion  Plaquenil  topamax        REVIEW OF SYSTEMS:     Const:   fever, chills, negative weight loss  Eyes:   negative diplopia or visual changes, negative eye pain  ENT:   negative coryza, negative sore throat  Resp:   negative cough, hemoptysis, dyspnea  Cards:  negative for chest pain, palpitations, lower extremity edema  :  negative for frequency, dysuria and hematuria or incontinence or retention  Heme:  negative for easy bruising and gum/nose bleeding  MS:   myalgias, arthralgias, back pain and muscle weakness  Neurolo:   headaches, no dizziness, vertigo, memory problems   Psych:  negative for feelings of anxiety, depression       Objective:   VITALS:    Visit Vitals    /75 (BP 1 Location: Left arm, BP Patient Position: At rest)    Pulse 100    Temp 99.3 °F (37.4 °C)    Resp 16    Ht 5' 6\" (1.676 m)    Wt 212 lb (96.2 kg)    SpO2 96%    BMI 34.22 kg/m2     Temp (24hrs), Av.7 °F (37.6 °C), Min:98.7 °F (37.1 °C), Max:100.8 °F (38.2 °C)    PHYSICAL EXAM:   General:    Alert cooperative- in some distress  Head:   Normocephalic, without obvious abnormality, atraumatic. Eyes:   Conjunctivae clear, anicteric sclerae. Pupils are equal  Nose:  Nares normal. No drainage or sinus tenderness. Throat:    Lips, mucosa, and tongue normal.  No Thrush  Neck:  Supple,      Back:    Tenderness over rt SI joint area  Lungs:   B/l air entry. Heart:   s1s2  Abdomen:   Soft, non-tender,not distended. Bowel sounds normal. No masses  Extremities: Extremities normal, atraumatic, no cyanosis. No edema. No clubbing  Skin:     No rashes or lesions.   Not Jaundiced  Lymph: Cervical, supraclavicular normal.  Neurologic: Grossly non-focal    Pertinent Labs   As above      IMAGING RESULTS:      Impression/Recommendation    27 yr female with lupus on plaquenil presents with 1 week h/o rt sided low back pain with radiation    L5-S1 facet joint infection with epidural abscess extending between l2-l5  Likely bacterial in origin- recent skin abscess on her face points to staph aureus. Could the plaquenil/sle  put her at mild immune suppression . Likely the trauma due to her 11 yr old son jumping on her back 2 months ago is contributing to this as well  Will hold off vanco /zosyn ( she got a dose of each)  until we have abscess culture sent. If she decompensates then she will need it soon , other wise wait  For Surgery/IR procedure      Lupus- on plaquenil- hold until acute infection is resolved  Discussed the management with patient and her brother in great detail.   Also discussed with   ___________________________________________________    Discussed with patient, requesting provider

## 2017-09-19 ENCOUNTER — APPOINTMENT (OUTPATIENT)
Dept: GENERAL RADIOLOGY | Age: 32
DRG: 853 | End: 2017-09-19
Attending: NEUROLOGICAL SURGERY
Payer: COMMERCIAL

## 2017-09-19 ENCOUNTER — APPOINTMENT (OUTPATIENT)
Dept: MRI IMAGING | Age: 32
DRG: 853 | End: 2017-09-19
Attending: NEUROLOGICAL SURGERY
Payer: COMMERCIAL

## 2017-09-19 ENCOUNTER — ANESTHESIA EVENT (OUTPATIENT)
Dept: SURGERY | Age: 32
DRG: 853 | End: 2017-09-19
Payer: COMMERCIAL

## 2017-09-19 ENCOUNTER — ANESTHESIA (OUTPATIENT)
Dept: SURGERY | Age: 32
DRG: 853 | End: 2017-09-19
Payer: COMMERCIAL

## 2017-09-19 LAB
ANION GAP SERPL CALC-SCNC: 9 MMOL/L (ref 5–15)
BASOPHILS # BLD: 0 K/UL (ref 0–0.1)
BASOPHILS NFR BLD: 0 % (ref 0–1)
BUN SERPL-MCNC: 9 MG/DL (ref 6–20)
BUN/CREAT SERPL: 14 (ref 12–20)
CALCIUM SERPL-MCNC: 8.7 MG/DL (ref 8.5–10.1)
CHLORIDE SERPL-SCNC: 109 MMOL/L (ref 97–108)
CO2 SERPL-SCNC: 21 MMOL/L (ref 21–32)
CREAT SERPL-MCNC: 0.64 MG/DL (ref 0.55–1.02)
CRP SERPL-MCNC: 26.4 MG/DL (ref 0–0.6)
EOSINOPHIL # BLD: 0 K/UL (ref 0–0.4)
EOSINOPHIL NFR BLD: 0 % (ref 0–7)
ERYTHROCYTE [DISTWIDTH] IN BLOOD BY AUTOMATED COUNT: 12.8 % (ref 11.5–14.5)
ERYTHROCYTE [SEDIMENTATION RATE] IN BLOOD: 117 MM/HR (ref 0–20)
GLUCOSE SERPL-MCNC: 97 MG/DL (ref 65–100)
HCT VFR BLD AUTO: 34.8 % (ref 35–47)
HGB BLD-MCNC: 11.5 G/DL (ref 11.5–16)
LYMPHOCYTES # BLD: 1.7 K/UL (ref 0.8–3.5)
LYMPHOCYTES NFR BLD: 8 % (ref 12–49)
MCH RBC QN AUTO: 31.5 PG (ref 26–34)
MCHC RBC AUTO-ENTMCNC: 33 G/DL (ref 30–36.5)
MCV RBC AUTO: 95.3 FL (ref 80–99)
MONOCYTES # BLD: 1.3 K/UL (ref 0–1)
MONOCYTES NFR BLD: 6 % (ref 5–13)
NEUTS SEG # BLD: 18.8 K/UL (ref 1.8–8)
NEUTS SEG NFR BLD: 86 % (ref 32–75)
PLATELET # BLD AUTO: 266 K/UL (ref 150–400)
POTASSIUM SERPL-SCNC: 3.9 MMOL/L (ref 3.5–5.1)
RBC # BLD AUTO: 3.65 M/UL (ref 3.8–5.2)
SODIUM SERPL-SCNC: 139 MMOL/L (ref 136–145)
WBC # BLD AUTO: 21.8 K/UL (ref 3.6–11)

## 2017-09-19 PROCEDURE — 87077 CULTURE AEROBIC IDENTIFY: CPT | Performed by: FAMILY MEDICINE

## 2017-09-19 PROCEDURE — 74011000272 HC RX REV CODE- 272: Performed by: NEUROLOGICAL SURGERY

## 2017-09-19 PROCEDURE — 77030002933 HC SUT MCRYL J&J -A: Performed by: NEUROLOGICAL SURGERY

## 2017-09-19 PROCEDURE — 00NY0ZZ RELEASE LUMBAR SPINAL CORD, OPEN APPROACH: ICD-10-PCS | Performed by: NEUROLOGICAL SURGERY

## 2017-09-19 PROCEDURE — 74011000250 HC RX REV CODE- 250: Performed by: NEUROLOGICAL SURGERY

## 2017-09-19 PROCEDURE — 74011250636 HC RX REV CODE- 250/636

## 2017-09-19 PROCEDURE — 77030018836 HC SOL IRR NACL ICUM -A: Performed by: NEUROLOGICAL SURGERY

## 2017-09-19 PROCEDURE — 87205 SMEAR GRAM STAIN: CPT | Performed by: FAMILY MEDICINE

## 2017-09-19 PROCEDURE — 74011250636 HC RX REV CODE- 250/636: Performed by: INTERNAL MEDICINE

## 2017-09-19 PROCEDURE — 77030014647 HC SEAL FBRN TISSL BAXT -D: Performed by: NEUROLOGICAL SURGERY

## 2017-09-19 PROCEDURE — 77030002916 HC SUT ETHLN J&J -A: Performed by: NEUROLOGICAL SURGERY

## 2017-09-19 PROCEDURE — 85025 COMPLETE CBC W/AUTO DIFF WBC: CPT | Performed by: FAMILY MEDICINE

## 2017-09-19 PROCEDURE — 77030032490 HC SLV COMPR SCD KNE COVD -B: Performed by: NEUROLOGICAL SURGERY

## 2017-09-19 PROCEDURE — 87147 CULTURE TYPE IMMUNOLOGIC: CPT | Performed by: INTERNAL MEDICINE

## 2017-09-19 PROCEDURE — 86140 C-REACTIVE PROTEIN: CPT | Performed by: FAMILY MEDICINE

## 2017-09-19 PROCEDURE — 87040 BLOOD CULTURE FOR BACTERIA: CPT | Performed by: INTERNAL MEDICINE

## 2017-09-19 PROCEDURE — 77030029099 HC BN WAX SSPC -A: Performed by: NEUROLOGICAL SURGERY

## 2017-09-19 PROCEDURE — 009300Z DRAINAGE OF INTRACRANIAL EPIDURAL SPACE WITH DRAINAGE DEVICE, OPEN APPROACH: ICD-10-PCS | Performed by: NEUROLOGICAL SURGERY

## 2017-09-19 PROCEDURE — 74011250636 HC RX REV CODE- 250/636: Performed by: ANESTHESIOLOGY

## 2017-09-19 PROCEDURE — 76060000036 HC ANESTHESIA 2.5 TO 3 HR: Performed by: NEUROLOGICAL SURGERY

## 2017-09-19 PROCEDURE — 74011250637 HC RX REV CODE- 250/637: Performed by: INTERNAL MEDICINE

## 2017-09-19 PROCEDURE — 76210000016 HC OR PH I REC 1 TO 1.5 HR: Performed by: NEUROLOGICAL SURGERY

## 2017-09-19 PROCEDURE — 87147 CULTURE TYPE IMMUNOLOGIC: CPT | Performed by: FAMILY MEDICINE

## 2017-09-19 PROCEDURE — 77030003029 HC SUT VCRL J&J -B: Performed by: NEUROLOGICAL SURGERY

## 2017-09-19 PROCEDURE — 74011000250 HC RX REV CODE- 250: Performed by: INTERNAL MEDICINE

## 2017-09-19 PROCEDURE — 74011000258 HC RX REV CODE- 258: Performed by: INTERNAL MEDICINE

## 2017-09-19 PROCEDURE — 77030026438 HC STYL ET INTUB CARD -A: Performed by: ANESTHESIOLOGY

## 2017-09-19 PROCEDURE — 77030031139 HC SUT VCRL2 J&J -A: Performed by: NEUROLOGICAL SURGERY

## 2017-09-19 PROCEDURE — 77030008684 HC TU ET CUF COVD -B: Performed by: ANESTHESIOLOGY

## 2017-09-19 PROCEDURE — 87186 SC STD MICRODIL/AGAR DIL: CPT | Performed by: FAMILY MEDICINE

## 2017-09-19 PROCEDURE — A9576 INJ PROHANCE MULTIPACK: HCPCS | Performed by: HOSPITALIST

## 2017-09-19 PROCEDURE — 74011000250 HC RX REV CODE- 250

## 2017-09-19 PROCEDURE — 72156 MRI NECK SPINE W/O & W/DYE: CPT

## 2017-09-19 PROCEDURE — 74011250636 HC RX REV CODE- 250/636: Performed by: HOSPITALIST

## 2017-09-19 PROCEDURE — 80048 BASIC METABOLIC PNL TOTAL CA: CPT | Performed by: FAMILY MEDICINE

## 2017-09-19 PROCEDURE — 72157 MRI CHEST SPINE W/O & W/DYE: CPT

## 2017-09-19 PROCEDURE — 76010000172 HC OR TIME 2.5 TO 3 HR INTENSV-TIER 1: Performed by: NEUROLOGICAL SURGERY

## 2017-09-19 PROCEDURE — 36415 COLL VENOUS BLD VENIPUNCTURE: CPT | Performed by: FAMILY MEDICINE

## 2017-09-19 PROCEDURE — 77030012406 HC DRN WND PENRS BARD -A: Performed by: NEUROLOGICAL SURGERY

## 2017-09-19 PROCEDURE — 85652 RBC SED RATE AUTOMATED: CPT | Performed by: INTERNAL MEDICINE

## 2017-09-19 PROCEDURE — 77030010507 HC ADH SKN DERMBND J&J -B: Performed by: NEUROLOGICAL SURGERY

## 2017-09-19 PROCEDURE — 77030012893

## 2017-09-19 PROCEDURE — 87075 CULTR BACTERIA EXCEPT BLOOD: CPT | Performed by: FAMILY MEDICINE

## 2017-09-19 PROCEDURE — 72020 X-RAY EXAM OF SPINE 1 VIEW: CPT

## 2017-09-19 PROCEDURE — 77030013079 HC BLNKT BAIR HGGR 3M -A: Performed by: ANESTHESIOLOGY

## 2017-09-19 PROCEDURE — 74011250637 HC RX REV CODE- 250/637: Performed by: NURSE PRACTITIONER

## 2017-09-19 PROCEDURE — 65270000032 HC RM SEMIPRIVATE

## 2017-09-19 RX ORDER — SODIUM CHLORIDE, SODIUM LACTATE, POTASSIUM CHLORIDE, CALCIUM CHLORIDE 600; 310; 30; 20 MG/100ML; MG/100ML; MG/100ML; MG/100ML
25 INJECTION, SOLUTION INTRAVENOUS CONTINUOUS
Status: DISCONTINUED | OUTPATIENT
Start: 2017-09-19 | End: 2017-09-19 | Stop reason: HOSPADM

## 2017-09-19 RX ORDER — DIPHENHYDRAMINE HCL 25 MG
25 CAPSULE ORAL
Status: DISCONTINUED | OUTPATIENT
Start: 2017-09-19 | End: 2017-09-25 | Stop reason: HOSPADM

## 2017-09-19 RX ORDER — PHENYLEPHRINE HCL IN 0.9% NACL 0.4MG/10ML
SYRINGE (ML) INTRAVENOUS AS NEEDED
Status: DISCONTINUED | OUTPATIENT
Start: 2017-09-19 | End: 2017-09-19 | Stop reason: HOSPADM

## 2017-09-19 RX ORDER — ONDANSETRON 2 MG/ML
4 INJECTION INTRAMUSCULAR; INTRAVENOUS AS NEEDED
Status: DISCONTINUED | OUTPATIENT
Start: 2017-09-19 | End: 2017-09-19 | Stop reason: HOSPADM

## 2017-09-19 RX ORDER — VANCOMYCIN 2 GRAM/500 ML IN 0.9 % SODIUM CHLORIDE INTRAVENOUS
2000 ONCE
Status: COMPLETED | OUTPATIENT
Start: 2017-09-19 | End: 2017-09-19

## 2017-09-19 RX ORDER — CYCLOBENZAPRINE HCL 10 MG
10 TABLET ORAL
Status: DISCONTINUED | OUTPATIENT
Start: 2017-09-19 | End: 2017-09-25 | Stop reason: HOSPADM

## 2017-09-19 RX ORDER — FENTANYL CITRATE 50 UG/ML
25 INJECTION, SOLUTION INTRAMUSCULAR; INTRAVENOUS
Status: DISCONTINUED | OUTPATIENT
Start: 2017-09-19 | End: 2017-09-19 | Stop reason: HOSPADM

## 2017-09-19 RX ORDER — ROCURONIUM BROMIDE 10 MG/ML
INJECTION, SOLUTION INTRAVENOUS AS NEEDED
Status: DISCONTINUED | OUTPATIENT
Start: 2017-09-19 | End: 2017-09-19 | Stop reason: HOSPADM

## 2017-09-19 RX ORDER — HYDROMORPHONE HYDROCHLORIDE 1 MG/ML
INJECTION, SOLUTION INTRAMUSCULAR; INTRAVENOUS; SUBCUTANEOUS AS NEEDED
Status: DISCONTINUED | OUTPATIENT
Start: 2017-09-19 | End: 2017-09-19 | Stop reason: HOSPADM

## 2017-09-19 RX ORDER — ONDANSETRON 2 MG/ML
INJECTION INTRAMUSCULAR; INTRAVENOUS AS NEEDED
Status: DISCONTINUED | OUTPATIENT
Start: 2017-09-19 | End: 2017-09-19 | Stop reason: HOSPADM

## 2017-09-19 RX ORDER — SUCCINYLCHOLINE CHLORIDE 20 MG/ML
INJECTION INTRAMUSCULAR; INTRAVENOUS AS NEEDED
Status: DISCONTINUED | OUTPATIENT
Start: 2017-09-19 | End: 2017-09-19 | Stop reason: HOSPADM

## 2017-09-19 RX ORDER — MORPHINE SULFATE 2 MG/ML
2 INJECTION, SOLUTION INTRAMUSCULAR; INTRAVENOUS
Status: DISCONTINUED | OUTPATIENT
Start: 2017-09-19 | End: 2017-09-20

## 2017-09-19 RX ORDER — MIDAZOLAM HYDROCHLORIDE 1 MG/ML
INJECTION, SOLUTION INTRAMUSCULAR; INTRAVENOUS AS NEEDED
Status: DISCONTINUED | OUTPATIENT
Start: 2017-09-19 | End: 2017-09-19 | Stop reason: HOSPADM

## 2017-09-19 RX ORDER — GLYCOPYRROLATE 0.2 MG/ML
INJECTION INTRAMUSCULAR; INTRAVENOUS AS NEEDED
Status: DISCONTINUED | OUTPATIENT
Start: 2017-09-19 | End: 2017-09-19 | Stop reason: HOSPADM

## 2017-09-19 RX ORDER — OXYCODONE AND ACETAMINOPHEN 5; 325 MG/1; MG/1
1 TABLET ORAL
Status: DISCONTINUED | OUTPATIENT
Start: 2017-09-19 | End: 2017-09-20

## 2017-09-19 RX ORDER — NEOSTIGMINE METHYLSULFATE 1 MG/ML
INJECTION INTRAVENOUS AS NEEDED
Status: DISCONTINUED | OUTPATIENT
Start: 2017-09-19 | End: 2017-09-19 | Stop reason: HOSPADM

## 2017-09-19 RX ORDER — ONDANSETRON 2 MG/ML
4 INJECTION INTRAMUSCULAR; INTRAVENOUS
Status: DISCONTINUED | OUTPATIENT
Start: 2017-09-19 | End: 2017-09-25 | Stop reason: HOSPADM

## 2017-09-19 RX ORDER — DEXAMETHASONE SODIUM PHOSPHATE 4 MG/ML
INJECTION, SOLUTION INTRA-ARTICULAR; INTRALESIONAL; INTRAMUSCULAR; INTRAVENOUS; SOFT TISSUE AS NEEDED
Status: DISCONTINUED | OUTPATIENT
Start: 2017-09-19 | End: 2017-09-19 | Stop reason: HOSPADM

## 2017-09-19 RX ORDER — SODIUM CHLORIDE 0.9 % (FLUSH) 0.9 %
5-10 SYRINGE (ML) INJECTION AS NEEDED
Status: DISCONTINUED | OUTPATIENT
Start: 2017-09-19 | End: 2017-09-25 | Stop reason: HOSPADM

## 2017-09-19 RX ORDER — PROPOFOL 10 MG/ML
INJECTION, EMULSION INTRAVENOUS AS NEEDED
Status: DISCONTINUED | OUTPATIENT
Start: 2017-09-19 | End: 2017-09-19 | Stop reason: HOSPADM

## 2017-09-19 RX ORDER — HYDROCODONE BITARTRATE AND ACETAMINOPHEN 5; 325 MG/1; MG/1
1 TABLET ORAL
Status: DISCONTINUED | OUTPATIENT
Start: 2017-09-19 | End: 2017-09-20

## 2017-09-19 RX ORDER — VANCOMYCIN HYDROCHLORIDE
1250 EVERY 8 HOURS
Status: DISCONTINUED | OUTPATIENT
Start: 2017-09-19 | End: 2017-09-20 | Stop reason: DRUGHIGH

## 2017-09-19 RX ORDER — HYDROMORPHONE HYDROCHLORIDE 1 MG/ML
0.2 INJECTION, SOLUTION INTRAMUSCULAR; INTRAVENOUS; SUBCUTANEOUS
Status: DISCONTINUED | OUTPATIENT
Start: 2017-09-19 | End: 2017-09-19 | Stop reason: HOSPADM

## 2017-09-19 RX ORDER — SODIUM CHLORIDE 0.9 % (FLUSH) 0.9 %
5-10 SYRINGE (ML) INJECTION EVERY 8 HOURS
Status: DISCONTINUED | OUTPATIENT
Start: 2017-09-19 | End: 2017-09-25 | Stop reason: HOSPADM

## 2017-09-19 RX ORDER — SODIUM CHLORIDE 0.9 % (FLUSH) 0.9 %
5-10 SYRINGE (ML) INJECTION AS NEEDED
Status: DISCONTINUED | OUTPATIENT
Start: 2017-09-19 | End: 2017-09-19 | Stop reason: HOSPADM

## 2017-09-19 RX ORDER — MORPHINE SULFATE 2 MG/ML
2 INJECTION, SOLUTION INTRAMUSCULAR; INTRAVENOUS
Status: DISCONTINUED | OUTPATIENT
Start: 2017-09-19 | End: 2017-09-19 | Stop reason: HOSPADM

## 2017-09-19 RX ORDER — ACETAMINOPHEN 325 MG/1
650 TABLET ORAL
Status: DISCONTINUED | OUTPATIENT
Start: 2017-09-19 | End: 2017-09-25 | Stop reason: HOSPADM

## 2017-09-19 RX ORDER — LIDOCAINE HYDROCHLORIDE 20 MG/ML
INJECTION, SOLUTION EPIDURAL; INFILTRATION; INTRACAUDAL; PERINEURAL AS NEEDED
Status: DISCONTINUED | OUTPATIENT
Start: 2017-09-19 | End: 2017-09-19 | Stop reason: HOSPADM

## 2017-09-19 RX ORDER — FENTANYL CITRATE 50 UG/ML
INJECTION, SOLUTION INTRAMUSCULAR; INTRAVENOUS AS NEEDED
Status: DISCONTINUED | OUTPATIENT
Start: 2017-09-19 | End: 2017-09-19 | Stop reason: HOSPADM

## 2017-09-19 RX ORDER — SODIUM CHLORIDE 0.9 % (FLUSH) 0.9 %
10 SYRINGE (ML) INJECTION
Status: COMPLETED | OUTPATIENT
Start: 2017-09-19 | End: 2017-09-19

## 2017-09-19 RX ADMIN — NAFCILLIN SODIUM 2 G: 2 INJECTION, POWDER, LYOPHILIZED, FOR SOLUTION INTRAMUSCULAR; INTRAVENOUS at 13:07

## 2017-09-19 RX ADMIN — FENTANYL CITRATE 25 MCG: 50 INJECTION, SOLUTION INTRAMUSCULAR; INTRAVENOUS at 13:01

## 2017-09-19 RX ADMIN — FENTANYL CITRATE 25 MCG: 50 INJECTION, SOLUTION INTRAMUSCULAR; INTRAVENOUS at 13:17

## 2017-09-19 RX ADMIN — MORPHINE SULFATE 2 MG: 2 INJECTION, SOLUTION INTRAMUSCULAR; INTRAVENOUS at 02:56

## 2017-09-19 RX ADMIN — BUPROPION HYDROCHLORIDE 150 MG: 150 TABLET, EXTENDED RELEASE ORAL at 18:43

## 2017-09-19 RX ADMIN — ONDANSETRON 4 MG: 2 INJECTION INTRAMUSCULAR; INTRAVENOUS at 10:23

## 2017-09-19 RX ADMIN — SODIUM CHLORIDE, SODIUM LACTATE, POTASSIUM CHLORIDE, AND CALCIUM CHLORIDE: 600; 310; 30; 20 INJECTION, SOLUTION INTRAVENOUS at 11:08

## 2017-09-19 RX ADMIN — GADOTERIDOL 20 ML: 279.3 INJECTION, SOLUTION INTRAVENOUS at 07:28

## 2017-09-19 RX ADMIN — GABAPENTIN 100 MG: 100 CAPSULE ORAL at 15:09

## 2017-09-19 RX ADMIN — Medication 10 ML: at 07:29

## 2017-09-19 RX ADMIN — SUCCINYLCHOLINE CHLORIDE 140 MG: 20 INJECTION INTRAMUSCULAR; INTRAVENOUS at 09:55

## 2017-09-19 RX ADMIN — BUTALBITAL, ACETAMINOPHEN AND CAFFEINE 1 TABLET: 50; 325; 40 TABLET ORAL at 03:06

## 2017-09-19 RX ADMIN — GABAPENTIN 100 MG: 100 CAPSULE ORAL at 21:35

## 2017-09-19 RX ADMIN — ROCURONIUM BROMIDE 10 MG: 10 INJECTION, SOLUTION INTRAVENOUS at 09:55

## 2017-09-19 RX ADMIN — CYCLOBENZAPRINE HYDROCHLORIDE 10 MG: 10 TABLET, FILM COATED ORAL at 21:54

## 2017-09-19 RX ADMIN — MORPHINE SULFATE 2 MG: 2 INJECTION, SOLUTION INTRAMUSCULAR; INTRAVENOUS at 15:09

## 2017-09-19 RX ADMIN — NEOSTIGMINE METHYLSULFATE 3 MG: 1 INJECTION INTRAVENOUS at 11:57

## 2017-09-19 RX ADMIN — FENTANYL CITRATE 50 MCG: 50 INJECTION, SOLUTION INTRAMUSCULAR; INTRAVENOUS at 09:55

## 2017-09-19 RX ADMIN — NAFCILLIN SODIUM 2 G: 2 INJECTION, POWDER, LYOPHILIZED, FOR SOLUTION INTRAMUSCULAR; INTRAVENOUS at 18:20

## 2017-09-19 RX ADMIN — FENTANYL CITRATE 25 MCG: 50 INJECTION, SOLUTION INTRAMUSCULAR; INTRAVENOUS at 13:07

## 2017-09-19 RX ADMIN — NAFCILLIN SODIUM 2 G: 2 INJECTION, POWDER, LYOPHILIZED, FOR SOLUTION INTRAMUSCULAR; INTRAVENOUS at 08:33

## 2017-09-19 RX ADMIN — PROPOFOL 200 MG: 10 INJECTION, EMULSION INTRAVENOUS at 09:55

## 2017-09-19 RX ADMIN — SODIUM CHLORIDE, SODIUM LACTATE, POTASSIUM CHLORIDE, AND CALCIUM CHLORIDE 25 ML/HR: 600; 310; 30; 20 INJECTION, SOLUTION INTRAVENOUS at 09:48

## 2017-09-19 RX ADMIN — FENTANYL CITRATE 50 MCG: 50 INJECTION, SOLUTION INTRAMUSCULAR; INTRAVENOUS at 09:47

## 2017-09-19 RX ADMIN — VANCOMYCIN HYDROCHLORIDE 2000 MG: 10 INJECTION, POWDER, LYOPHILIZED, FOR SOLUTION INTRAVENOUS at 09:01

## 2017-09-19 RX ADMIN — HYDROXYCHLOROQUINE SULFATE 200 MG: 200 TABLET, FILM COATED ORAL at 18:43

## 2017-09-19 RX ADMIN — MIDAZOLAM HYDROCHLORIDE 2 MG: 1 INJECTION, SOLUTION INTRAMUSCULAR; INTRAVENOUS at 09:47

## 2017-09-19 RX ADMIN — LIDOCAINE HYDROCHLORIDE 100 MG: 20 INJECTION, SOLUTION EPIDURAL; INFILTRATION; INTRACAUDAL; PERINEURAL at 09:55

## 2017-09-19 RX ADMIN — OXYCODONE HYDROCHLORIDE AND ACETAMINOPHEN 1 TABLET: 5; 325 TABLET ORAL at 04:03

## 2017-09-19 RX ADMIN — NAFCILLIN SODIUM 2 G: 2 INJECTION, POWDER, LYOPHILIZED, FOR SOLUTION INTRAMUSCULAR; INTRAVENOUS at 21:35

## 2017-09-19 RX ADMIN — MORPHINE SULFATE 2 MG: 2 INJECTION, SOLUTION INTRAMUSCULAR; INTRAVENOUS at 19:40

## 2017-09-19 RX ADMIN — OXYCODONE HYDROCHLORIDE AND ACETAMINOPHEN 1 TABLET: 5; 325 TABLET ORAL at 23:06

## 2017-09-19 RX ADMIN — ESCITALOPRAM OXALATE 10 MG: 10 TABLET ORAL at 14:22

## 2017-09-19 RX ADMIN — BUTALBITAL, ACETAMINOPHEN AND CAFFEINE 1 TABLET: 50; 325; 40 TABLET ORAL at 19:40

## 2017-09-19 RX ADMIN — GLYCOPYRROLATE 0.4 MG: 0.2 INJECTION INTRAMUSCULAR; INTRAVENOUS at 11:57

## 2017-09-19 RX ADMIN — Medication 80 MCG: at 10:28

## 2017-09-19 RX ADMIN — TOPIRAMATE 50 MG: 25 TABLET, FILM COATED ORAL at 18:43

## 2017-09-19 RX ADMIN — HYDROMORPHONE HYDROCHLORIDE 1 MG: 1 INJECTION, SOLUTION INTRAMUSCULAR; INTRAVENOUS; SUBCUTANEOUS at 10:39

## 2017-09-19 RX ADMIN — DEXAMETHASONE SODIUM PHOSPHATE 4 MG: 4 INJECTION, SOLUTION INTRA-ARTICULAR; INTRALESIONAL; INTRAMUSCULAR; INTRAVENOUS; SOFT TISSUE at 10:23

## 2017-09-19 RX ADMIN — FENTANYL CITRATE 50 MCG: 50 INJECTION, SOLUTION INTRAMUSCULAR; INTRAVENOUS at 12:12

## 2017-09-19 RX ADMIN — ROCURONIUM BROMIDE 30 MG: 10 INJECTION, SOLUTION INTRAVENOUS at 10:03

## 2017-09-19 RX ADMIN — Medication 10 ML: at 21:46

## 2017-09-19 RX ADMIN — MORPHINE SULFATE 2 MG: 2 INJECTION, SOLUTION INTRAMUSCULAR; INTRAVENOUS at 08:33

## 2017-09-19 RX ADMIN — VANCOMYCIN HYDROCHLORIDE 1250 MG: 10 INJECTION, POWDER, LYOPHILIZED, FOR SOLUTION INTRAVENOUS at 18:43

## 2017-09-19 NOTE — ROUTINE PROCESS
TRANSFER - IN REPORT:    Verbal report received from [address] on [facility]  being received from 219) for ordered procedure      Report consisted of patients Situation, Background, Assessment and   Recommendations(SBAR). Information from the following report(s) SBAR was reviewed with the receiving nurse. Opportunity for questions and clarification was provided. Assessment completed upon patients arrival to unit and care assumed.

## 2017-09-19 NOTE — PROGRESS NOTES
ID  Blood culture came positive for gram positive cocci in clusters last night- antibiotic was on hold as not sure whether it was coag neg staph or Aureus and as she was fgoing for surgery did not want to affect the culture result  This morning spoke to the lab and they say it is latex positive which means staph aureus- PBP is pending-   So will now treat as MSSA and MRSA until we have the final ID  Will give nafcillin and vanco before surgery  Spoke to  Neurosurgeon  Spoke the the pharmacist and her nurse

## 2017-09-19 NOTE — ANESTHESIA PREPROCEDURE EVALUATION
Anesthetic History   No history of anesthetic complications            Review of Systems / Medical History  Patient summary reviewed, nursing notes reviewed and pertinent labs reviewed    Pulmonary  Within defined limits                 Neuro/Psych             Comments: Lumbar abscess   Patient having severe pain in the back Cardiovascular                  Exercise tolerance: >4 METS  Comments: 2016-- normal stress test, EF 60%   GI/Hepatic/Renal                Endo/Other        Obesity    Comments: Lupus Other Findings            Physical Exam    Airway  Mallampati: II  TM Distance: 4 - 6 cm  Neck ROM: normal range of motion   Mouth opening: Normal     Cardiovascular    Rhythm: regular  Rate: normal         Dental  No notable dental hx       Pulmonary  Breath sounds clear to auscultation               Abdominal         Other Findings            Anesthetic Plan    ASA: 2  Anesthesia type: general          Induction: Intravenous  Anesthetic plan and risks discussed with: Patient

## 2017-09-19 NOTE — PROGRESS NOTES
Occupational Therapy  Chart reviewed; patient currently off unit for surgery; will retry tomorrow for OT eval. Alethea Stanton OTR/L

## 2017-09-19 NOTE — CDMP QUERY
Please clarify if this patient is being treated/managed for:    =>Sepsis (POA) in the setting of documented SIRS, Lumbar epidural abscess; Leukocytosis, Positive blood cultures, Tachycardia, and fever requiring fluid bolus, IV antibiotics and ID consult. =>Other Explanation of clinical findings  =>Unable to Determine (no explanation of clinical findings)    The medical record reflects the following clinical findings, treatment, and risk factors:    Risk Factors: lumbar epidural abscess    Clinical Indicators: Temp: 100.8, , WBC 21.8    Treatment: fluid bolus, IV antibiotics and ID consult    Please clarify and document your clinical opinion in the progress notes and discharge summary including the definitive and/or presumptive diagnosis, (suspected or probable), related to the above clinical findings. Please include clinical findings supporting your diagnosis.     Thank you  Unruly Mae  [facility]  [phone]

## 2017-09-19 NOTE — OP NOTES
[address] [facility] [address], [address]   OP NOTE       Name:  Manuel Carrero   MR#:  [id number]   :  1985   Account #:  [de-identified]    Surgery Date:  2017   Date of Adm:  2017       PREOPERATIVE DIAGNOSIS: Lumbar abscess. POSTOPERATIVE DIAGNOSIS: Lumbar abscess. PROCEDURES PERFORMED: L3-L4 laminectomy and evacuation of   abscess. SURGEON: Ashlie Valadez MD    ANESTHESIA: General.    ESTIMATED BLOOD LOSS: 25 mL. SPECIMENS REMOVED: abscess tissue and pus    COMPLICATIONS: None. FINDINGS: Abscess in the epidural space. INDICATIONS FOR OPERATION SURGERY: This is a 49-year-old   woman who was admitted with severe back pain that has been   persistent for one week and  a 1-2 day history of fevers and chills with   elevated white count. Blood cultures have grown staph positive cocci in   pairs. Workup included an MRI of the lumbar spine that showed   epidural abscess extending from L2 to L5 with the most significant   component at L3-L4 with compression of the thecal sac. She had MRI   of the cervical and thoracic spine that did not show any other areas of   epidural abscess. The risks and benefits of surgical decompression   were discussed. She understood these and agreed to proceed. DESCRIPTION OF PROCEDURE: The patient was brought to the   operating room, placed under general endotracheal anesthesia. She   was turned prone onto a Dario frame, which was cranked for   maximum flexion. A midline incision was marked. She was sterilely   prepped and draped in standard fashion. She had been given   vancomycin as well as nafcillin less than 2 hours prior to the incision   and these were written to continue postoperatively and will be   monitored by Infectious Disease. She had SCDs placed and these   were in place during the entire case.  After she was sterilely prepped   and draped,  I infiltrated the midline   incision with lidocaine with epinephrine. I used a 10 blade to incise the   skin. Hemostasis was obtained with Bovie and bipolar cautery. Dissection was carried down to the spinous processes of L3, L4,   and the inferior portion of L2 spinous process. Self-retaining retractors were placed. I   continued with dissection, in the subperiosteal plane to minimize   Bleeding, along the spinous processes of L3 and L4 and the inferior   portion of L2 spinous process. I used intraoperative x-ray to verify the L4-L5 level as the   most caudal portion for decompression. After I had good exposure of   the spinous processes and the lamina, taking care to keep the facets   intact to minimize any postoperative instability, I used, first a Leksell   rongeur to remove the spinous processes and thinned the lamina at L3   and L4 and the inferior portion of L2 and then removed the remainder   of the lamina with a 4-mm Kerrison. Particularly along the right lateral   recess, there was an abscess. There was pus that was found and this   was removed. First it was cultured with swabs and then I aspirated   portions of it. I continued with my decompression, first at L4 and then   continued up to L3. Again pockets of pus were found, particularly in   the lateral recess on the right and these were removed. I then   undermined L2, particularly on the right and was able to remove   additional pus and then also used a Saul to explore caudally   underneath the superior edge of L5 and again removing any pus that   was found. There was good decompression when I finished. I did not   use any bone wax or FloSeal, as I did not want there to be a potential   source for harboring infection. I irrigated copiously with antibiotic   irrigation. A drain was placed and I tunneled it underneath the skin.  The   wound was closed using 0 interrupted Vicryl for the fascia, 2-0   interrupted Vicryl for the Ema's fascia, and 3-0 interrupted Vicryl for the   subcutaneous tissue and 4-0 running subcuticular stitch for the skin. A drain was placed. The drain was secured with nylon. She tolerated   the surgery well and was taken to the PACU in stable condition.         MD JM Berry / RADHA   D:  09/19/2017   12:16   T:  09/19/2017   13:07   Job #:  [id number]

## 2017-09-19 NOTE — PROGRESS NOTES
Referral received and acknowledged. Patient is off the floor for surgery today so will follow up tomorrow.

## 2017-09-19 NOTE — PROGRESS NOTES
Hospitalist Progress Note  Office: [phone]      Date of Service:  2017  NAME:  David Adhikari  :  1985  MRN:  [id number]      Admission Summary:   28 yo woman with lupus and obesity presented to the ED from home on 17 with back pain radiating to the right buttock and right leg. She was diagnosed with sciatica on 17. She was admitted inpatient medical floor for SIRS. Interval history / Subjective:   F/u back pain  Returned from surgery  Currently sleeping comfortably without any pain     Assessment & Plan:     Lumbar epidural abscess  - CT abd/pelvis  nonobstructing L kidney stone otherwise no acute  - MRI lumbar  Extensive epidural abscess from the L2 level to the mid L5 in the posterior  right spinal canal, likely originating from inflammatory changes in the right L5-S1 facet joint. At least moderate thecal sac stenosis due to the collection. Soft tissue edema and small fluid collection abutting the posterior aspect of the right L5-S1 facet joint. Probable small right psoas abscess  -MRI thoracic and cervical spine unremarkable  - BCx  prelim suggestive of MRSA  -Repeat blood culture  pending  - on vanc/Nafcillin started , continue  - s/p SPINE LUMBAR LAMINECTOMY L3-L4 WITH EVACUATION OF ABSCESS   -follow culture  -follow ID/Neurosurgery    Sepsis  -sec to above  -Will get echo  -May need JOSE as well    Back pain with lumbar radiculopathy and recent dx sciatica (POA)  - pain control with scheduled TID ibuprofen, lidocaine patch, gabapentin; prn Percocet  - [facility]  reviewed  and last had Percocet 5/325 #20 ordered on 17 by PCP  - dx sciatica recently at [facility], sent home with prednisone but no improvement  - PT/OT evals    Lupus   - resume home Plaquenil  - Rheum is Dr Kevin Marcial at [facility]    Obesity, Body mass index is 34.22 kg/(m^2).      Hypokalemia (POA) - replete prn    Code status: full  DVT prophylaxis: SCDs    Care Plan discussed with: Patient/Family, Nurse and   Disposition: Home with family when stable     Hospital Problems  Date Reviewed: 9/18/2017          Codes Class Noted POA    Hypokalemia ICD-10-CM: E87.6  ICD-9-CM: 276.8  9/18/2017 Unknown        Intractable back pain ICD-10-CM: M54.9  ICD-9-CM: 724.5  9/18/2017 Unknown        * (Principal)SIRS due to infectious process without acute organ dysfunction ([facility] [address] ICD-10-CM: A41.9  ICD-9-CM: 038.9, 995.91  9/18/2017 Yes        Obesity (BMI 30-39.9) (Chronic) ICD-10-CM: E66.9  ICD-9-CM: 278.00  9/18/2017 Yes        Bacteriuria ICD-10-CM: R82.71  ICD-9-CM: 791.9  9/18/2017 Yes            Review of Systems:   Pertinent items are noted in HPI. Vital Signs:    Last 24hrs VS reviewed since prior progress note.  Most recent are:  Visit Vitals    /66 (BP 1 Location: Left arm, BP Patient Position: At rest)    Pulse 88    Temp 99.7 °F (37.6 °C)    Resp 16    Ht 5' 6\" (1.676 m)    Wt 96.2 kg (212 lb)    SpO2 98%    BMI 34.22 kg/m2       Intake/Output Summary (Last 24 hours) at 09/19/17 1727  Last data filed at 09/19/17 1324   Gross per 24 hour   Intake             1500 ml   Output               25 ml   Net             1475 ml        Physical Examination:     Constitutional:  awake, no acute distress, cooperative, pleasant, tearful, obese   ENT:  Oral mucosa moist, oropharynx benign  Neck supple, no masses   Resp:  CTA bilaterally, no wheezing/rhonchi/rales   CV:  regular rhythm, tachycardic, no m/r/g appreciated, no peripheral edema, +pulses    GI:  +BS, soft, non distended, non tender, obese     Musculoskeletal:  moves all extremities but reduced ROM b/l LEs; straight leg raise + on right    Neurologic:  AAOx3, NFD     Skin:  warm, dry  Eyes:  PERRL    Data Review:    Review and/or order of clinical lab test  Review and/or order of tests in the radiology section of CPT  Review and/or order of tests in the medicine section of [facility]    Labs:     Recent Labs      09/19/17 0355 09/18/17 0228   WBC  21.8*  15.9*   HGB  11.5  12.9   HCT  34.8*  38.0   PLT  266  293     Recent Labs      09/19/17 0355 09/18/17 0228   NA  139  138   K  3.9  3.4*   CL  109*  102   CO2  21  27   BUN  9  16   CREA  0.64  0.72   GLU  97  98   CA  8.7  8.7     Recent Labs      09/18/17 0228   SGOT  15   ALT  63   AP  80   TBILI  0.4   TP  7.1   ALB  3.0*   GLOB  4.1*   LPSE  145     Recent Labs      09/18/17 1919   INR  1.1   PTP  11.4*   APTT  35.7*      No results for input(s): FE, TIBC, PSAT, FERR in the last 72 hours. No results found for: FOL, RBCF   No results for input(s): PH, PCO2, PO2 in the last 72 hours. No results for input(s): CPK, CKNDX, TROIQ in the last 72 hours.     No lab exists for component: CPKMB  No results found for: CHOL, CHOLX, CHLST, CHOLV, HDL, LDL, LDLC, DLDLP, TGLX, TRIGL, TRIGP, CHHD, CHHDX  No results found for: [facility]  Lab Results   Component Value Date/Time    Color YELLOW/STRAW 09/18/2017 03:32 AM    Appearance CLEAR 09/18/2017 03:32 AM    Specific gravity 1.030 09/18/2017 03:32 AM    Specific gravity 1.024 03/20/2015 02:41 PM    pH (UA) 6.0 09/18/2017 03:32 AM    Protein NEGATIVE  09/18/2017 03:32 AM    Glucose NEGATIVE  09/18/2017 03:32 AM    Ketone NEGATIVE  09/18/2017 03:32 AM    Bilirubin NEGATIVE  09/18/2017 03:32 AM    Urobilinogen 0.2 09/18/2017 03:32 AM    Nitrites NEGATIVE  09/18/2017 03:32 AM    Leukocyte Esterase NEGATIVE  09/18/2017 03:32 AM    Epithelial cells MANY 09/18/2017 03:32 AM    Bacteria 1+ 09/18/2017 03:32 AM    WBC 0-4 09/18/2017 03:32 AM    RBC 0-5 09/18/2017 03:32 AM     Medications Reviewed:     Current Facility-Administered Medications   Medication Dose Route Frequency    nafcillin (NALLPEN) 2 g in 0.9% sodium chloride (MBP/ADV) 100 mL  2 g IntraVENous Q4H    Vancomycin - Pharmacy to Dose   Other Rx Dosing/Monitoring    vancomycin (VANCOCIN) 1250 mg in  ml infusion  1,250 mg IntraVENous Q8H    cyclobenzaprine (FLEXERIL) tablet 10 mg  10 mg Oral TID PRN    sodium chloride (NS) flush 5-10 mL  5-10 mL IntraVENous Q8H    sodium chloride (NS) flush 5-10 mL  5-10 mL IntraVENous PRN    topiramate (TOPAMAX) tablet 50 mg  50 mg Oral BID    hydroxychloroquine (PLAQUENIL) tablet 200 mg  200 mg Oral BID    lidocaine (LIDODERM) 5 % patch 1 Patch  1 Patch TransDERmal Q24H    gabapentin (NEURONTIN) capsule 100 mg  100 mg Oral TID    senna-docusate (PERICOLACE) 8.6-50 mg per tablet 1 Tab  1 Tab Oral DAILY    escitalopram oxalate (LEXAPRO) tablet 10 mg  10 mg Oral DAILY    buPROPion SR (WELLBUTRIN SR) tablet 150 mg  150 mg Oral BID    oxyCODONE-acetaminophen (PERCOCET) 5-325 mg per tablet 1 Tab  1 Tab Oral Q6H PRN    morphine injection 2 mg  2 mg IntraVENous Q4H PRN    butalbital-acetaminophen-caffeine (FIORICET, ESGIC) -40 mg per tablet 1 Tab  1 Tab Oral Q6H PRN     ______________________________________________________________________  EXPECTED LENGTH OF STAY: 3d 2h  ACTUAL LENGTH OF STAY:          1                 Brett Gallo MD

## 2017-09-19 NOTE — PERIOP NOTES
FLOSEAL ( LOT: [id number], EXP: 11/30/2018,  CHAD RAIMICHAEL LOT#: [id number], EXP: 03/30/2019) 10ML USED.

## 2017-09-19 NOTE — PROGRESS NOTES
Neurosurgery Progress Note  Maia Aggarwal [place]  [phone]        Admit Date: 2017   LOS: 1 day        Daily Progress Note: 2017    POD:Day of Surgery    S/P: Procedure(s):  SPINE LUMBAR LAMINECTOMY L3-L4 WITH EVACUATION OF ABSCESS     Subjective: The patient developed severe lower back pain and sciatic pain over a week ago. She denies accident or injury to her back. She went to an urgent care clinic and was given steroids/Toradol without relief. She developed a fever and chills with worsening pain, so she presented to the ER. She also had increasing cervical and thoracic pain. Imaging of her spine revealed a lumbar epidural abscess. She does have a history of lupus. She takes Plaquenil on a regular basis. She states she does not normally take prednisone. She was placed on this last week by urgent care for her sciatic pain. Post-operatively, her pain is different. It is now more surgical and located at the incision. The pain going down her legs has improved. The MRI scans of her cervical spine and thoracic spine were negative for epidural abscess. Denies  chest pain, nausea, vomiting, difficulty swallowing, headache, and dyspnea. Objective:     Vital signs  Temp (24hrs), Av.1 °F (37.3 °C), Min:98.5 °F (36.9 °C), Max:99.3 °F (37.4 °C)    07 -  1900  In: 1500 [I.V.:1500]  Out: 25        Visit Vitals    /79 (BP 1 Location: Left arm, BP Patient Position: At rest)    Pulse (!) 107    Temp 99.3 °F (37.4 °C)    Resp 18    Ht 5' 6\" (1.676 m)    Wt 96.2 kg (212 lb)    SpO2 93%    BMI 34.22 kg/m2    O2 Flow Rate (L/min): 2 l/min O2 Device: Nasal cannula     Pain control  Pain Assessment  Pain Scale 1: Numeric (0 - 10)  Pain Intensity 1: 6  Pain Onset 1:  (post op)  Pain Location 1: Back  Pain Orientation 1: Lower  Pain Description 1: Aching  Pain Intervention(s) 1: Medication (see MAR)    PT/OT  Gait                 Physical Exam:  Gen:NAD. Neuro: A&Ox3.  Follows commands. Speech clear. Affect normal.  KISER. Strength 5/5 in UE and LE BL. Gait deferred. Skin: Lumbar dressing C/D/I. HVAC with serosanguineous drainage    MRI L-spine with and without contrast on 09/18/17 shows Extensive epidural abscess from the L2 level to the mid L5 in the posterior right spinal canal, likely originating from inflammatory changes in the right  L5-S1 facet joint. At least moderate thecal sac stenosis due to the collection. Soft tissue edema and small fluid collection abutting the posterior aspect of the right L5-S1 facet joint. Probable small right psoas abscess. 24 hour results:    Recent Results (from the past 24 hour(s))   PROTHROMBIN TIME + INR    Collection Time: 09/18/17  7:19 PM   Result Value Ref Range    INR 1.1 0.9 - 1.1      Prothrombin time 11.4 (H) 9.0 - 11.1 sec   PTT    Collection Time: 09/18/17  7:19 PM   Result Value Ref Range    aPTT 35.7 (H) 22.1 - 32.5 sec    aPTT, therapeutic range     58.0 - 83.0 SECS   METABOLIC PANEL, BASIC    Collection Time: 09/19/17  3:55 AM   Result Value Ref Range    Sodium 139 136 - 145 mmol/L    Potassium 3.9 3.5 - 5.1 mmol/L    Chloride 109 (H) 97 - 108 mmol/L    CO2 21 21 - 32 mmol/L    Anion gap 9 5 - 15 mmol/L    Glucose 97 65 - 100 mg/dL    BUN 9 6 - 20 MG/DL    Creatinine 0.64 0.55 - 1.02 MG/DL    BUN/Creatinine ratio 14 12 - 20      GFR est AA >60 >60 ml/min/1.73m2    GFR est non-AA >60 >60 ml/min/1.73m2    Calcium 8.7 8.5 - 10.1 MG/DL   CBC WITH AUTOMATED DIFF    Collection Time: 09/19/17  3:55 AM   Result Value Ref Range    WBC 21.8 (H) 3.6 - 11.0 K/uL    RBC 3.65 (L) 3.80 - 5.20 M/uL    HGB 11.5 11.5 - 16.0 g/dL    HCT 34.8 (L) 35.0 - 47.0 %    MCV 95.3 80.0 - 99.0 FL    MCH 31.5 26.0 - 34.0 PG    MCHC 33.0 30.0 - 36.5 g/dL    RDW 12.8 11.5 - 14.5 %    PLATELET 312 326 - 722 K/uL    NEUTROPHILS 86 (H) 32 - 75 %    LYMPHOCYTES 8 (L) 12 - 49 %    MONOCYTES 6 5 - 13 %    EOSINOPHILS 0 0 - 7 %    BASOPHILS 0 0 - 1 %    ABS.  NEUTROPHILS 18. 8 (H) 1.8 - 8.0 K/UL    ABS. LYMPHOCYTES 1.7 0.8 - 3.5 K/UL    ABS. MONOCYTES 1.3 (H) 0.0 - 1.0 K/UL    ABS. EOSINOPHILS 0.0 0.0 - 0.4 K/UL    ABS. BASOPHILS 0.0 0.0 - 0.1 K/UL   SED RATE (ESR)    Collection Time: 09/19/17  3:55 AM   Result Value Ref Range    Sed rate, automated 117 (H) 0 - 20 mm/hr   C REACTIVE PROTEIN, QT    Collection Time: 09/19/17  3:55 AM   Result Value Ref Range    C-Reactive protein 26.40 (H) 0.00 - 0.60 mg/dL   CULTURE, WOUND W GRAM STAIN    Collection Time: 09/19/17 11:09 AM   Result Value Ref Range    Special Requests: EPIDURAL ABSCESS     GRAM STAIN NO WBC'S SEEN      GRAM STAIN NO ORGANISMS SEEN      Culture result: PENDING    CULTURE, TISSUE W GRAM STAIN    Collection Time: 09/19/17 11:40 AM   Result Value Ref Range    Special Requests: EPIDURAL ABSCESS     GRAM STAIN NO WBC'S SEEN      GRAM STAIN RARE GRAM POSITIVE COCCI      Culture result: PENDING           Assessment:     Principal Problem:    SIRS due to infectious process without acute organ dysfunction ([facility] [address] (9/18/2017)    Active Problems:    Hypokalemia (9/18/2017)      Intractable back pain (9/18/2017)      Obesity (BMI 30-39.9) (9/18/2017)      Bacteriuria (9/18/2017)        Plan:   1. Lumbar epidural abscess   - s/p L3-4 laminectomy with evacuation of epidural abscess 09/19   - Pain control with Morphine PRN, Percocet PRN, Flexeril PRN   - Cont HVAC   - PT/OT evals   - ID following. Pt on Vanc/Nafcillin   - Contact precautions for possible MRSA   - Cx from surgery pending   - Cont bowel regimen  2. MRSA bacteremia   - ID following   - Contact precautions  3. SIRS   - Hospitalist following   - ID following  4. Obesity   - BMI 34   - Counseling when able  5. Lupus   - Cont Plaquenil from home   - D/C Prednisone as was only for sciatic pain  6. Hypokalemia   - Potassium repleted   - Hospitalist following  7. Leukocytosis   - due to #1 and #2  8.  Anxiety   - Cont lexapro, wellbutrin    Activity: up with assist  DVT ppx: SCDs  Dispo: tbd    Plan d/w Dr. Carlotta Ku, nurse. Will transfer patient to spine floor for post-op spinal care.       Annetta Collins, NP

## 2017-09-19 NOTE — PROGRESS NOTES
Pharmacist Note - Vancomycin Dosing    Consult provided for this 27 y.o. female for indication of Bacteremia  Antibiotic regimen(s): nafcillin    Recent Labs      17   0355  17   0228   WBC  21.8*  15.9*   CREA  0.64  0.72   BUN  9  16     Height: 167.6 cm  Weight: 96.2 kg  Est CrCl: >100 ml/min  Temp (24hrs), Av.2 °F (37.3 °C), Min:99 °F (37.2 °C), Max:99.6 °F (37.6 °C)    Cultures:   blood - MRSA in 2 of 4, pending   blood - in process    Goal trough = 15 - 20 mcg/mL    Patient received 2000mg x1 yesterday @ ~0900. Will order 2000 mg (~20mg/kg) IV x 1 to give now (within 2 hours of incision as pre-op dose) and will be followed by a maintenance dose of 1250mg IV Q8h. Pharmacy to follow patient daily and order levels / make dose adjustments as appropriate.

## 2017-09-19 NOTE — PERIOP NOTES
TRANSFER - OUT REPORT:    Verbal report given to Vilma Parks on Lucero Barrera  being transferred to 1 for routine post - op       Report consisted of patients Situation, Background, Assessment and   Recommendations(SBAR). Time Pre op antibiotic given:nafcillin on schedule  Anesthesia Stop time: 8838  Nino Present on Transfer to floor:no  Order for Nino on Chart:no    Information from the following report(s) SBAR, OR Summary, Intake/Output and MAR was reviewed with the receiving nurse. Opportunity for questions and clarification was provided. Is the patient on 02? YES       L/Min 2        Is the patient on a monitor? NO    Is the nurse transporting with the patient? NO    Surgical Waiting Area notified of patient's transfer from PACU? YES      The following personal items collected during your admission accompanied patient upon transfer:   Dental Appliance: Dental Appliances: None  Vision: Visual Aid: Glasses  Hearing Aid:    Jewelry:    Clothing: Clothing: Other (comment) (underwear bagged and attached to chart)  Other Valuables:  Other Valuables: Eyeglasses (glasses to pacu)  Valuables sent to safe:

## 2017-09-19 NOTE — PROGRESS NOTES
TRANSFER - OUT REPORT:    Verbal report given to Taniya Ugarte RN(name) on [place]  being transferred to [facility](unit) for routine progression of care       Report consisted of patients Situation, Background, Assessment and   Recommendations(SBAR). Information from the following report(s) SBAR, Kardex, Intake/Output, MAR and Recent Results was reviewed with the receiving nurse. Lines:   Peripheral IV 09/19/17 Left Hand (Active)   Site Assessment Clean, dry, & intact 9/19/2017  3:09 PM   Phlebitis Assessment 0 9/19/2017  3:09 PM   Infiltration Assessment 0 9/19/2017  3:09 PM   Dressing Status Clean, dry, & intact 9/19/2017  3:09 PM   Dressing Type Transparent 9/19/2017  3:09 PM   Hub Color/Line Status Capped 9/19/2017  3:09 PM   Action Taken Open ports on tubing capped 9/19/2017  3:09 PM   Alcohol Cap Used Yes 9/19/2017  3:09 PM       Peripheral IV 09/19/17 Right Hand (Active)   Site Assessment Clean, dry, & intact 9/19/2017  3:09 PM   Phlebitis Assessment 0 9/19/2017  3:09 PM   Infiltration Assessment 0 9/19/2017  3:09 PM   Dressing Status Clean, dry, & intact 9/19/2017  3:09 PM   Dressing Type Transparent 9/19/2017  3:09 PM   Hub Color/Line Status Infusing 9/19/2017  3:09 PM   Action Taken Open ports on tubing capped 9/19/2017  3:09 PM   Alcohol Cap Used Yes 9/19/2017  3:09 PM        Opportunity for questions and clarification was provided.

## 2017-09-19 NOTE — BRIEF OP NOTE
BRIEF OPERATIVE NOTE    Date of Procedure: 9/19/2017   Preoperative Diagnosis: LUMBAR ABSCESS  Postoperative Diagnosis: LUMBAR ABSCESS    Procedure(s):  SPINE LUMBAR LAMINECTOMY L3-L4 WITH EVACUATION OF ABSCESS   Surgeon(s) and Role:     * Nadja Norman MD - Primary         Assistant Staff:       Surgical Staff:  Circ-1: Stephen Ivey  Circ-Relief: Alejandro Barfield RN  Radiology Technician: Sanjay Whitten, RT  Scrub Tech-1: Julieth Shanks  Surg Asst-1: Titus Adams RN  Float Staff:  Alejandro Barfield RN  Event Time In   Incision Start 1024   Incision Close      Anesthesia: General   Estimated Blood Loss: 25cc  Specimens:   ID Type Source Tests Collected by Time Destination   1 : epidural abscess Other Spine AEROBIC/ANAEROBIC CULTURE Nadja Norman MD 9/19/2017 1109 Microbiology   2 : epidural abcess Abscess  AEROBIC/ANAEROBIC CULTURE Nadja Norman MD 9/19/2017 1140 Microbiology      Findings: abscess   Complications: none  Implants: * No implants in log *

## 2017-09-19 NOTE — ANESTHESIA POSTPROCEDURE EVALUATION
Post-Anesthesia Evaluation and Assessment    Patient: Anne Marie Loyd MRN: [id number]  SSN: [id number]    YOB: 1985  Age: 27 y.o. Sex: female       Cardiovascular Function/Vital Signs  Visit Vitals    /79 (BP 1 Location: Left arm, BP Patient Position: At rest)    Pulse (!) 107    Temp 37.4 °C (99.3 °F)    Resp 18    Ht 5' 6\" (1.676 m)    Wt 96.2 kg (212 lb)    SpO2 93%    BMI 34.22 kg/m2       Patient is status post general anesthesia for Procedure(s):  SPINE LUMBAR LAMINECTOMY L3-L4 WITH EVACUATION OF ABSCESS . Nausea/Vomiting: None    Postoperative hydration reviewed and adequate. Pain:  Pain Scale 1: Numeric (0 - 10) (09/19/17 1509)  Pain Intensity 1: 6 (09/19/17 1509)   Managed    Neurological Status:   Neuro (WDL): Within Defined Limits (09/19/17 1319)  Neuro  Neurologic State: Alert (09/19/17 1319)  Orientation Level: Oriented X4 (09/19/17 1319)  Cognition: Follows commands (09/19/17 1319)  LUE Motor Response: Purposeful (09/19/17 1319)  LLE Motor Response: Purposeful (09/19/17 1319)  RUE Motor Response: Purposeful (09/19/17 1319)  RLE Motor Response: Purposeful (09/19/17 1319)   At baseline    Mental Status and Level of Consciousness: Arousable    Pulmonary Status:   O2 Device: Nasal cannula (09/19/17 1231)   Adequate oxygenation and airway patent    Complications related to anesthesia: None    Post-anesthesia assessment completed.  No concerns    Signed By: Jihan Boland MD     September 19, 2017

## 2017-09-19 NOTE — ROUTINE PROCESS
Patient: Roselyn Concepcion MRN: [id number]  SSN: [id number]   YOB: 1985  Age: 27 y.o. Sex: female     Patient is status post Procedure(s):  SPINE LUMBAR LAMINECTOMY L3-L4 WITH EVACUATION OF ABSCESS . Surgeon(s) and Role:     * Claudia Gonzalez MD - Primary                      Peripheral IV 09/19/17 Left Hand (Active)       Peripheral IV 09/19/17 Right Hand (Active)          Hemovac Left; Lower Back (Active)   Site Assessment Clean, dry, & intact 9/19/2017 11:42 AM   Dressing Status Clean, dry, & intact 9/19/2017 11:42 AM   Drainage Description Serosanguinous 9/19/2017 11:42 AM   Status Charged 9/19/2017 11:42 AM      Airway - Endotracheal Tube 09/19/17 Oral (Active)                   Dressing/Packing:  Wound Back Mid-DRESSING TYPE: Topical skin adhesive/glue (telfa, island dress) (09/19/17 1147)  Splint/Cast:  ]

## 2017-09-20 LAB
ANION GAP SERPL CALC-SCNC: 6 MMOL/L (ref 5–15)
BASOPHILS # BLD: 0 K/UL (ref 0–0.1)
BASOPHILS NFR BLD: 0 % (ref 0–1)
BUN SERPL-MCNC: 10 MG/DL (ref 6–20)
BUN/CREAT SERPL: 12 (ref 12–20)
CALCIUM SERPL-MCNC: 8.4 MG/DL (ref 8.5–10.1)
CHLORIDE SERPL-SCNC: 108 MMOL/L (ref 97–108)
CO2 SERPL-SCNC: 28 MMOL/L (ref 21–32)
CREAT SERPL-MCNC: 0.83 MG/DL (ref 0.55–1.02)
DATE LAST DOSE: ABNORMAL
EOSINOPHIL # BLD: 0 K/UL (ref 0–0.4)
EOSINOPHIL NFR BLD: 0 % (ref 0–7)
ERYTHROCYTE [DISTWIDTH] IN BLOOD BY AUTOMATED COUNT: 12.7 % (ref 11.5–14.5)
GLUCOSE SERPL-MCNC: 139 MG/DL (ref 65–100)
HCT VFR BLD AUTO: 29 % (ref 35–47)
HGB BLD-MCNC: 9.6 G/DL (ref 11.5–16)
LYMPHOCYTES # BLD: 1.8 K/UL (ref 0.8–3.5)
LYMPHOCYTES NFR BLD: 9 % (ref 12–49)
MCH RBC QN AUTO: 31.5 PG (ref 26–34)
MCHC RBC AUTO-ENTMCNC: 33.1 G/DL (ref 30–36.5)
MCV RBC AUTO: 95.1 FL (ref 80–99)
MONOCYTES # BLD: 1 K/UL (ref 0–1)
MONOCYTES NFR BLD: 5 % (ref 5–13)
NEUTS SEG # BLD: 16.5 K/UL (ref 1.8–8)
NEUTS SEG NFR BLD: 86 % (ref 32–75)
PLATELET # BLD AUTO: 294 K/UL (ref 150–400)
POTASSIUM SERPL-SCNC: 3.7 MMOL/L (ref 3.5–5.1)
RBC # BLD AUTO: 3.05 M/UL (ref 3.8–5.2)
REPORTED DOSE,DOSE: ABNORMAL UNITS
REPORTED DOSE/TIME,TMG: ABNORMAL
SODIUM SERPL-SCNC: 142 MMOL/L (ref 136–145)
VANCOMYCIN TROUGH SERPL-MCNC: 11 UG/ML (ref 5–10)
WBC # BLD AUTO: 19.3 K/UL (ref 3.6–11)

## 2017-09-20 PROCEDURE — 74011000258 HC RX REV CODE- 258: Performed by: INTERNAL MEDICINE

## 2017-09-20 PROCEDURE — 74011250637 HC RX REV CODE- 250/637: Performed by: INTERNAL MEDICINE

## 2017-09-20 PROCEDURE — 65270000032 HC RM SEMIPRIVATE

## 2017-09-20 PROCEDURE — 74011250637 HC RX REV CODE- 250/637: Performed by: NURSE PRACTITIONER

## 2017-09-20 PROCEDURE — 74011250636 HC RX REV CODE- 250/636: Performed by: NEUROLOGICAL SURGERY

## 2017-09-20 PROCEDURE — 74011250637 HC RX REV CODE- 250/637: Performed by: NEUROLOGICAL SURGERY

## 2017-09-20 PROCEDURE — 80048 BASIC METABOLIC PNL TOTAL CA: CPT | Performed by: NEUROLOGICAL SURGERY

## 2017-09-20 PROCEDURE — 97116 GAIT TRAINING THERAPY: CPT

## 2017-09-20 PROCEDURE — 97165 OT EVAL LOW COMPLEX 30 MIN: CPT

## 2017-09-20 PROCEDURE — G8987 SELF CARE CURRENT STATUS: HCPCS

## 2017-09-20 PROCEDURE — 97530 THERAPEUTIC ACTIVITIES: CPT

## 2017-09-20 PROCEDURE — 77030032490 HC SLV COMPR SCD KNE COVD -B

## 2017-09-20 PROCEDURE — 74011250636 HC RX REV CODE- 250/636: Performed by: INTERNAL MEDICINE

## 2017-09-20 PROCEDURE — G8988 SELF CARE GOAL STATUS: HCPCS

## 2017-09-20 PROCEDURE — 97161 PT EVAL LOW COMPLEX 20 MIN: CPT

## 2017-09-20 PROCEDURE — 80202 ASSAY OF VANCOMYCIN: CPT | Performed by: HOSPITALIST

## 2017-09-20 PROCEDURE — 74011000250 HC RX REV CODE- 250: Performed by: INTERNAL MEDICINE

## 2017-09-20 PROCEDURE — 36415 COLL VENOUS BLD VENIPUNCTURE: CPT | Performed by: NEUROLOGICAL SURGERY

## 2017-09-20 PROCEDURE — 93306 TTE W/DOPPLER COMPLETE: CPT

## 2017-09-20 PROCEDURE — 85025 COMPLETE CBC W/AUTO DIFF WBC: CPT | Performed by: HOSPITALIST

## 2017-09-20 RX ORDER — VANCOMYCIN/0.9 % SOD CHLORIDE 1.5G/250ML
1500 PLASTIC BAG, INJECTION (ML) INTRAVENOUS EVERY 8 HOURS
Status: DISCONTINUED | OUTPATIENT
Start: 2017-09-21 | End: 2017-09-25 | Stop reason: HOSPADM

## 2017-09-20 RX ORDER — OXYCODONE AND ACETAMINOPHEN 5; 325 MG/1; MG/1
1-2 TABLET ORAL
Status: DISCONTINUED | OUTPATIENT
Start: 2017-09-20 | End: 2017-09-25 | Stop reason: HOSPADM

## 2017-09-20 RX ADMIN — NAFCILLIN SODIUM 2 G: 2 INJECTION, POWDER, LYOPHILIZED, FOR SOLUTION INTRAMUSCULAR; INTRAVENOUS at 01:10

## 2017-09-20 RX ADMIN — CYCLOBENZAPRINE HYDROCHLORIDE 10 MG: 10 TABLET, FILM COATED ORAL at 13:20

## 2017-09-20 RX ADMIN — DOCUSATE SODIUM AND SENNOSIDES 1 TABLET: 8.6; 5 TABLET, FILM COATED ORAL at 08:51

## 2017-09-20 RX ADMIN — BUPROPION HYDROCHLORIDE 150 MG: 150 TABLET, EXTENDED RELEASE ORAL at 08:51

## 2017-09-20 RX ADMIN — VANCOMYCIN HYDROCHLORIDE 1250 MG: 10 INJECTION, POWDER, LYOPHILIZED, FOR SOLUTION INTRAVENOUS at 08:51

## 2017-09-20 RX ADMIN — ACETAMINOPHEN 650 MG: 325 TABLET, FILM COATED ORAL at 08:51

## 2017-09-20 RX ADMIN — Medication 10 ML: at 21:50

## 2017-09-20 RX ADMIN — NAFCILLIN SODIUM 2 G: 2 INJECTION, POWDER, LYOPHILIZED, FOR SOLUTION INTRAMUSCULAR; INTRAVENOUS at 11:04

## 2017-09-20 RX ADMIN — Medication 10 ML: at 05:11

## 2017-09-20 RX ADMIN — GABAPENTIN 100 MG: 100 CAPSULE ORAL at 08:51

## 2017-09-20 RX ADMIN — MORPHINE SULFATE 2 MG: 2 INJECTION, SOLUTION INTRAMUSCULAR; INTRAVENOUS at 00:22

## 2017-09-20 RX ADMIN — GABAPENTIN 100 MG: 100 CAPSULE ORAL at 15:13

## 2017-09-20 RX ADMIN — HYDROXYCHLOROQUINE SULFATE 200 MG: 200 TABLET, FILM COATED ORAL at 08:51

## 2017-09-20 RX ADMIN — ACETAMINOPHEN 650 MG: 325 TABLET, FILM COATED ORAL at 13:20

## 2017-09-20 RX ADMIN — MORPHINE SULFATE 2 MG: 2 INJECTION, SOLUTION INTRAMUSCULAR; INTRAVENOUS at 23:05

## 2017-09-20 RX ADMIN — OXYCODONE HYDROCHLORIDE AND ACETAMINOPHEN 1 TABLET: 5; 325 TABLET ORAL at 05:10

## 2017-09-20 RX ADMIN — OXYCODONE HYDROCHLORIDE AND ACETAMINOPHEN 2 TABLET: 5; 325 TABLET ORAL at 15:13

## 2017-09-20 RX ADMIN — TOPIRAMATE 50 MG: 25 TABLET, FILM COATED ORAL at 17:37

## 2017-09-20 RX ADMIN — VANCOMYCIN HYDROCHLORIDE 1250 MG: 10 INJECTION, POWDER, LYOPHILIZED, FOR SOLUTION INTRAVENOUS at 00:21

## 2017-09-20 RX ADMIN — VANCOMYCIN HYDROCHLORIDE 1250 MG: 10 INJECTION, POWDER, LYOPHILIZED, FOR SOLUTION INTRAVENOUS at 17:37

## 2017-09-20 RX ADMIN — TOPIRAMATE 50 MG: 25 TABLET, FILM COATED ORAL at 08:51

## 2017-09-20 RX ADMIN — OXYCODONE HYDROCHLORIDE AND ACETAMINOPHEN 2 TABLET: 5; 325 TABLET ORAL at 19:22

## 2017-09-20 RX ADMIN — MORPHINE SULFATE 2 MG: 2 INJECTION, SOLUTION INTRAMUSCULAR; INTRAVENOUS at 09:07

## 2017-09-20 RX ADMIN — BUPROPION HYDROCHLORIDE 150 MG: 150 TABLET, EXTENDED RELEASE ORAL at 17:37

## 2017-09-20 RX ADMIN — ESCITALOPRAM OXALATE 10 MG: 10 TABLET ORAL at 08:51

## 2017-09-20 RX ADMIN — OXYCODONE HYDROCHLORIDE AND ACETAMINOPHEN 1 TABLET: 5; 325 TABLET ORAL at 11:02

## 2017-09-20 RX ADMIN — CYCLOBENZAPRINE HYDROCHLORIDE 10 MG: 10 TABLET, FILM COATED ORAL at 21:49

## 2017-09-20 RX ADMIN — NAFCILLIN SODIUM 2 G: 2 INJECTION, POWDER, LYOPHILIZED, FOR SOLUTION INTRAMUSCULAR; INTRAVENOUS at 05:11

## 2017-09-20 RX ADMIN — GABAPENTIN 100 MG: 100 CAPSULE ORAL at 21:49

## 2017-09-20 NOTE — PROGRESS NOTES
Problem: Self Care Deficits Care Plan (Adult)  Goal: *Acute Goals and Plan of Care (Insert Text)  Occupational Therapy Goals  Initiated 9/20/2017  1. Patient will perform lower body dressing with modified independence using AE PRN within 7 days. 2. Patient will perform toileting with modified independence using most appropriate DME within 7 days. 3. Patient will perform standing 5 mins at modified independence within 7 days. 4. Patient will perform gathering ADL items high and low without cues safety, technique or tool use 4/4 at modified independence within 7 days. 5. Patient will verbalize/demonstrate 3/3 back precautions during ADL tasks without cues within 7 days. OCCUPATIONAL THERAPY EVALUATION  Patient: Darcie Justice (58 y.o. female)  Date: 9/20/2017  Primary Diagnosis: Intractable back pain  Leukocytosis  Hypokalemia  LUMBAR ABSCESS  Procedure(s) (LRB):  SPINE LUMBAR LAMINECTOMY L3-L4 WITH EVACUATION OF ABSCESS  (N/A) 1 Day Post-Op   Precautions:   Back      ASSESSMENT :  Based on the objective data described below, the patient presents with independence to mod A ADLs, bed mobility supervision, functional mobility to and from bathroom with RW with supervision. ADLs limited by typical s/p back sx and back precautions. However, patient unable to lie supine >minute, sit > 10 mins, standing balance fair and requiring RW during functional mobility and standing ADLs, R LE & hip pain still present. Education provided verbally and via handout. Patient plans on discharging to [facility], education provided if has to discharge home alone. Recommend HHOT to address deficits, safety and independence for basic and instrumental ADLs. Recommend with nursing patient to complete as able in order to maintain strength, endurance and independence: ADLs with supervision/setup, OOB to chair 3x/day and mobilizing to the bathroom for toileting with 1 assist. Thank you for your assistance.       Patient will benefit from skilled intervention to address the above impairments. Patients rehabilitation potential is considered to be Good  Factors which may influence rehabilitation potential include:   [X]             None noted  [ ]             Mental ability/status  [ ]             Medical condition  [ ]             Home/family situation and support systems  [ ]             Safety awareness  [ ]             Pain tolerance/management  [ ]             Other:        PLAN :  Recommendations and Planned Interventions:  [X]               Self Care Training                  [X]        Therapeutic Activities  [X]               Functional Mobility Training    [ ]        Cognitive Retraining  [X]               Therapeutic Exercises           [X]        Endurance Activities  [X]               Balance Training                   [ ]        Neuromuscular Re-Education  [ ]               Visual/Perceptual Training     [X]   Home Safety Training  [X]               Patient Education                 [X]        Family Training/Education  [ ]               Other (comment):     Frequency/Duration: Patient will be followed by occupational therapy 5 times a week to address goals. Discharge Recommendations: Home Health, None and To Be Determined  Further Equipment Recommendations for Discharge: reacher, long handled sponge       SUBJECTIVE:   Patient stated I have been up to the bathroom with the nurse.       OBJECTIVE DATA SUMMARY:   HISTORY:   Past Medical History:   Diagnosis Date    Lupus ([address]      PVC's (premature ventricular contractions)       Past Surgical History:   Procedure Laterality Date    HX  SECTION   2012    HX ORTHOPAEDIC Bilateral       knee    HX TONSILLECTOMY            Prior Level of Function/Home Situation: independent with all ADLs, works as a , 2 children in which the oldest is 6 and can stay / will stay with their father until she has recovered.  She plans on discharging to a friend's house.    Expanded or extensive additional review of patient history:      Home Situation  Home Environment: Apartment  # Steps to Enter: 13  Rails to Enter: Yes  Hand Rails : Left  One/Two Story Residence: One story  Living Alone: Yes (has 2 small children who will stay with dad)  Support Systems: [place] / [place] community, Friends \ neighbors  Patient Expects to be Discharged to[de-identified] Apartment  Tub or Shower Type: Tub  [X]  Right hand dominant             [ ]  Left hand dominant     EXAMINATION OF PERFORMANCE DEFICITS:  Cognitive/Behavioral Status:  Neurologic State: Alert  Orientation Level: Oriented X4  Cognition: Appropriate for age attention/concentration        Safety/Judgement: Awareness of environment     Skin: intact incisional bandage, Hemovac, and B PIVs     Edema: intact     Hearing: Auditory  Auditory Impairment: None     Vision/Perceptual:                           Acuity: Impaired near vision; Impaired far vision          Range of Motion:  B shoulder flexion 90*, elbows-digits WDL; poor LE tailor sitting  AROM: Within functional limits                          Strength:  B UEs -3/5 shoulder flexion, elbows-digits +3/5 during ADLs; not formally tested * precautions  Strength: Within functional limits                 Coordination:  Coordination: Within functional limits  Fine Motor Skills-Upper: Left Intact; Right Intact    Gross Motor Skills-Upper: Left Intact; Right Intact     Tone & Sensation:  States resolve of R LE, still pain though  Tone: Normal  Sensation: Intact                       Balance:  Sitting: Intact; Without support  Standing: Impaired; Without support (one hand on supportive surface)  Standing - Static: Fair  Standing - Dynamic : Fair     Functional Mobility and Transfers for ADLs:  Bed Mobility:  Rolling: Supervision  Supine to Sit: Supervision  Scooting: Supervision     Transfers:  Sit to Stand: Supervision  Stand to Sit: Supervision  Toilet Transfer : Supervision  Tub Transfer:  Total assistance (not safe at this time)     ADL Assessment:  Feeding: Independent     Oral Facial Hygiene/Grooming: Supervision sitting EOB, setup on tray brushing teeth, combing hair     Bathing: Moderate assistance infer from LE ROM; dry shampoo use     Upper Body Dressing: Supervision setup     Lower Body Dressing: Moderate assistance poor tailor sitting, fair standing balance, RW use; slide on shoes independently     Toileting: Minimum assistance increased time and pain, fair standing balance                  ADL Intervention and task modifications:     Patient instructed and indicated understanding the benefits of maintaining activity tolerance, functional mobility, and independence with self care tasks during acute stay  to ensure safe return home and to baseline. Encouraged patient to increase frequency and duration OOB, not sitting longer than 30 mins without marching/walking with staff, be out of bed for all meals, perform daily ADLs (as approved by RN/MD regarding bathing etc), and performing functional mobility to/from bathroom. Patient instruction and indicated understanding on body mechanics, ergonomics and gravitational force on the spine during different body positions to plan activities in prep for return home to complete basic and instrumental ADLs and back to work safely. Dressing lower body: Patient instructed to don R LE first and doff last and on the benefits to remain seated to don all clothing to increase independence with precautions and pain management. Instruction on use of long handled reacher for grabbing shoes to don, use for clothing to don. Toileting: Patient instructed on the benefits of using flushable wet wipes and toilet tongs if decreased reach or pain for monty care. Also, the benefits of a reacher to aid in clothing management.    Home safety: Patient instructed and indicated understanding on home modifications and safety (raise height of ADL objects, appropriate height of chair surfaces, recliner safety, change of floor surfaces, clear pathways) to increase independence and fall prevention. Standing: Patient instructed and indicated understanding to walk up to sink/counter top/surfaces, step into walker, square off while using objects, slide objects along surfaces, to increase adherence to back precautions and fall prevention. Patient instructed to increase amount of time standing in order to increase independence and tolerance with ADLs. During prolonged standing, can open cabinet door or place foot on stool to decrease spinal pressure/increase pain. Cognitive Retraining  Safety/Judgement: Awareness of environment     Therapeutic Exercise:  Patient instructed on the benefits shoulder flexion exercises to increase independence with ADLs, active ROM, and strength of spine. Patient demonstrated 5 reps and 1 set(s) while supine; 1 rep sitting EOB with supervision. Patient instructed and demonstrated techniques of activating abdomen and pelvic floor muscles. Patient instructed and indicated understanding how to progress reps, sets, against gravity to then working up to 5 lbs until surgeon clears for increased weight, supine to sitting and then standing. Can use household items as weights. Handout provided. Functional Measure:  Barthel Index:      Bathin  Bladder: 10  Bowels: 10  Groomin  Dressin  Feeding: 10  Mobility: 5  Stairs: 0  Toilet Use: 5  Transfer (Bed to Chair and Back): 10  Total: 60         Barthel and G-code impairment scale:  Percentage of impairment CH  0% CI  1-19% CJ  20-39% CK  40-59% CL  60-79% CM  80-99% CN  100%   Barthel Score 0-100 100 99-80 79-60 59-40 20-39 1-19    0   Barthel Score 0-20 20 17-19 13-16 9-12 5-8 1-4 0      The Barthel ADL Index: Guidelines  1. The index should be used as a record of what a patient does, not as a record of what a patient could do.   2. The main aim is to establish degree of independence from any help, physical or verbal, however minor and for whatever reason. 3. The need for supervision renders the patient not independent. 4. A patient's performance should be established using the best available evidence. Asking the patient, friends/relatives and nurses are the usual sources, but direct observation and common sense are also important. However direct testing is not needed. 5. Usually the patient's performance over the preceding 24-48 hours is important, but occasionally longer periods will be relevant. 6. Middle categories imply that the patient supplies over 50 per cent of the effort. 7. Use of aids to be independent is allowed. Jose Francisco Joyce., Barthel, D.W. (4184). Functional evaluation: the Barthel Index. [address] [facility] (14)2. Elizabeth Hayward deisy VAISHALI Katz, Ban Vincent., Charis Apley., [place], [address] (1999). Measuring the change indisability after inpatient rehabilitation; comparison of the responsiveness of the Barthel Index and Functional [place] Measure. Journal of Neurology, Neurosurgery, and Psychiatry, 66(4), 408-665. Leland Lennox, N.J.A, BARBRA Bruce, & Chetan Murphy, M.A. (2004.) Assessment of post-stroke quality of life in cost-effectiveness studies: The usefulness of the Barthel Index and the EuroQoL-5D. Quality of Life Research, 13, 112-69         G codes: In compliance with CMSs Claims Based Outcome Reporting, the following G-code set was chosen for this patient based on their primary functional limitation being treated: The outcome measure chosen to determine the severity of the functional limitation was the Barthel Index with a score of 60/100 which was correlated with the impairment scale.       · Self Care:               - CURRENT STATUS:    CJ - 20%-39% impaired, limited or restricted               - GOAL STATUS:           CH - 0% impaired, limited or restricted               - D/C STATUS:                       ---------------To be determined---------------           Pain:  Pain Scale 1: Numeric (0 - 10)  Pain Intensity 1: 5  Pain Location 1: Back  Pain Orientation 1: Lower  Pain Description 1: Aching  Pain Intervention(s) 1: Medication (see MAR)  Activity Tolerance:   VSS  Please refer to the flowsheet for vital signs taken during this treatment. After treatment:   [ ] Patient left in no apparent distress sitting up in chair  [X] Patient left in no apparent distress in bed  [X] Call bell left within reach  [X] Nursing notified  [ ] Caregiver present  [ ] Bed alarm activated      COMMUNICATION/EDUCATION:   The patients plan of care was discussed with: Physical Therapist and Registered Nurse.  [X] Home safety education was provided and the patient/caregiver indicated understanding. [X] Patient/family have participated as able in goal setting and plan of care. [X] Patient/family agree to work toward stated goals and plan of care. [ ] Patient understands intent and goals of therapy, but is neutral about his/her participation. [ ] Patient is unable to participate in goal setting and plan of care. This patients plan of care is appropriate for delegation to [facility].      Thank you for this referral.  Juana Borges  Time Calculation: 39 mins

## 2017-09-20 NOTE — PROGRESS NOTES
Problem: Mobility Impaired (Adult and Pediatric)  Goal: *Acute Goals and Plan of Care (Insert Text)  Physical Therapy Goals  Initiated 9/20/2017    1. Patient will move from supine to sit and sit to supine and roll side to side in bed with independence within 4 days. 2. Patient will perform sit to stand with independence within 4 days. 3. Patient will ambulate with independence for 150 feet with the least restrictive device within 4 days. 4. Patient will ascend/descend 13 stairs with 1 handrail(s) with modified independence within 4 days. 5. Patient will verbalize and demonstrate understanding of spinal precautions (No bending, lifting greater than 5 lbs, or twisting; log-roll technique; frequent repositioning as instructed) within 4 days.    PHYSICAL THERAPY TREATMENT  Patient: Мария Aparicio (48 y.o. female)  Date: 9/20/2017  Diagnosis: Intractable back pain  Leukocytosis  Hypokalemia  LUMBAR ABSCESS SIRS due to infectious process without acute organ dysfunction (HCC)  Procedure(s) (LRB):  SPINE LUMBAR LAMINECTOMY L3-L4 WITH EVACUATION OF ABSCESS  (N/A) 1 Day Post-Op  Precautions: Back,No bending, no lifting greater than 5 lbs, no twisting, log-roll technique, repositioning every 20-30 min except when sleeping  ASSESSMENT:  Pt is making slow, steady progress, continues to be very limited with activity tolerance due to severe back pain, pt reports currently her pain is worse than earlier today, rated 9/10, pt completed side lying to sit with supervision, sit to stand with supervision, and tolerated ambulation with a rolling walker x 30 feet with decreased pace, occasionally stops due to severe pain, pt c/o feeling tired and requested to return to bed,  assisted back to bed with minimal assistance and positioned comfortably in side lying with ice pack on back, reviewed back precautions with pt, pt needs to be independent to return home safely, may benefit from rehab versus home health pending progress  Progression toward goals:  [ ]      Improving appropriately and progressing toward goals  [X]      Improving slowly and progressing toward goals  [ ]      Not making progress toward goals and plan of care will be adjusted       PLAN:  Patient continues to benefit from skilled intervention to address the above impairments. Continue treatment per established plan of care. Discharge Recommendations:  Rehab, Home Health and To Be Determined  Further Equipment Recommendations for Discharge: To be determined        SUBJECTIVE:   Patient stated My back hurts worse than it did earlier.   Pt c/p feeling tired. The patient stated 0/3 back precautions. Reviewed all 3 with patient. OBJECTIVE DATA SUMMARY:   Critical Behavior:  Neurologic State: Alert, Appropriate for age  Orientation Level: Oriented X4  Cognition: Appropriate decision making, Appropriate for age attention/concentration, Appropriate safety awareness  Safety/Judgement: Awareness of environment, Lack of insight into deficits  Functional Mobility Training:  Bed Mobility:  Supine to Sit: Supervision;Assist x1;Additional time  Sit to Supine: Minimum assistance; Additional time;Assist x1  Scooting: Supervision     Transfers:  Sit to Stand: Supervision  Stand to Sit: Supervision                             Balance:  Sitting: Intact  Standing: Impaired  Standing - Static: Good  Standing - Dynamic : Good (with rolling walker)  Ambulation/Gait Training:  Distance (ft): 30 Feet (ft)  Assistive Device: Gait belt;Walker, rolling  Ambulation - Level of Assistance: Contact guard assistance;Assist x1     Gait Description (WDL): Exceptions to WDL  Gait Abnormalities: Antalgic;Decreased step clearance              Speed/Zehra: Pace decreased (<100 feet/min), stops occasionally due to severe pain   Step Length: Right shortened;Left shortened           Pain:  Pain Scale 1: Numeric (0 - 10)  Pain Intensity 1: 9  Pain Location 1: Back  Pain Orientation 1: Lower  Pain Description 1: Aching  Pain Intervention(s) 1: Medication (see MAR)  Activity Tolerance:   Fair due to severe back pain      After treatment:   [ ]  Patient left in no apparent distress sitting up in chair  [X]  Patient left in no apparent distress in bed  [X]  Call bell left within reach  [X]  Nursing notified  [ ]  Caregiver present  [ ]  Bed alarm activated      COMMUNICATION/COLLABORATION:   The patients plan of care was discussed with: Registered Nurse     Amy Georgeanne Aase   Time Calculation: 24 mins

## 2017-09-20 NOTE — PROGRESS NOTES
Bedside and Verbal shift change report given to [address] (oncoming nurse) by Trixie Kehr RN (offgoing nurse). Report included the following information SBAR, Kardex, Intake/Output, MAR and Recent Results.

## 2017-09-20 NOTE — PROGRESS NOTES
Primary Nurse Dinorah Bautista and Ashlie Thayer RN performed a dual skin assessment on this patient Impairment noted- surgical back incision and scattered bruising on bilateral hips and thighs  Azam score is 22

## 2017-09-20 NOTE — PROGRESS NOTES
Neurosurgery Progress Note  Charla Harden [place]  [phone]        Admit Date: 2017   LOS: 2 days        Daily Progress Note: 2017    POD: Postoperative day #1    S/P: Procedure(s):  SPINE LUMBAR LAMINECTOMY L3-L4 WITH EVACUATION OF ABSCESS     Subjective: The patient developed severe lower back pain and sciatic pain over a week ago. She denies accident or injury to her back. She went to an urgent care clinic and was given steroids/Toradol without relief. She developed a fever and chills with worsening pain, so she presented to the ER. She also had increasing cervical and thoracic pain. Imaging of her spine revealed a lumbar epidural abscess. She does have a history of lupus. She takes Plaquenil on a regular basis. She states she does not normally take prednisone. She was placed on this last week by urgent care for her sciatic pain. She is now postop day #1 from evacuation of lumbar epidural abscess. She endorses incisional pain but denies any radicular pain or numbness or tingling. She denies muscle spasms. Strength 5/5 in lower extremities. HVAC with 35 cc output today. MRSA isolated from intraoperative cultures. Echo pending to look for valvular vegetation. The MRI scans of her cervical spine and thoracic spine without evidence of epidural abscess. Denies  chest pain, nausea, vomiting, difficulty swallowing, headache, and dyspnea. Objective:     Vital signs  Temp (24hrs), Av.1 °F (37.3 °C), Min:98.6 °F (37 °C), Max:99.7 °F (37.6 °C)   701 - 1900  In: -   Out: 0253 [Urine:1700; Drains:35]  1901 -  0700  In: 2000 [P.O.:500;  I.V.:1500]  Out: 1530 [Urine:1400; Drains:105]    Visit Vitals    /75 (BP 1 Location: Right arm, BP Patient Position: At rest)    Pulse 85    Temp 98.6 °F (37 °C)    Resp 18    Ht 5' 6\" (1.676 m)    Wt 96.2 kg (212 lb)    SpO2 99%    BMI 34.22 kg/m2    O2 Flow Rate (L/min): 2 l/min O2 Device: Room air     Pain control  Pain Assessment  Pain Scale 1: Numeric (0 - 10)  Pain Intensity 1: 5  Pain Onset 1: post op  Pain Location 1: Back  Pain Orientation 1: Lower  Pain Description 1: Aching  Pain Intervention(s) 1: Medication (see MAR)    PT/OT  Gait     Gait  Speed/Zehra: Slow  Step Length: Left shortened, Right shortened  Gait Abnormalities: Antalgic, Decreased step clearance  Ambulation - Level of Assistance: Contact guard assistance  Distance (ft): 25 Feet (ft)  Assistive Device: Gait belt, Walker, rolling           Physical Exam:  Gen:NAD. Neuro: A&Ox3. Follows commands. Speech clear. Affect normal.  KISER. Strength 5/5 in UE and LE BL. Gait deferred. Skin: Lumbar dressing C/D/I. HVAC with serosanguineous drainage    MRI L-spine with and without contrast on 09/18/17 shows Extensive epidural abscess from the L2 level to the mid L5 in the posterior right spinal canal, likely originating from inflammatory changes in the right  L5-S1 facet joint. At least moderate thecal sac stenosis due to the collection. Soft tissue edema and small fluid collection abutting the posterior aspect of the right L5-S1 facet joint. Probable small right psoas abscess.     24 hour results:    Recent Results (from the past 24 hour(s))   METABOLIC PANEL, BASIC    Collection Time: 09/20/17  4:57 AM   Result Value Ref Range    Sodium 142 136 - 145 mmol/L    Potassium 3.7 3.5 - 5.1 mmol/L    Chloride 108 97 - 108 mmol/L    CO2 28 21 - 32 mmol/L    Anion gap 6 5 - 15 mmol/L    Glucose 139 (H) 65 - 100 mg/dL    BUN 10 6 - 20 MG/DL    Creatinine 0.83 0.55 - 1.02 MG/DL    BUN/Creatinine ratio 12 12 - 20      GFR est AA >60 >60 ml/min/1.73m2    GFR est non-AA >60 >60 ml/min/1.73m2    Calcium 8.4 (L) 8.5 - 10.1 MG/DL   CBC WITH AUTOMATED DIFF    Collection Time: 09/20/17  4:57 AM   Result Value Ref Range    WBC 19.3 (H) 3.6 - 11.0 K/uL    RBC 3.05 (L) 3.80 - 5.20 M/uL    HGB 9.6 (L) 11.5 - 16.0 g/dL    HCT 29.0 (L) 35.0 - 47.0 %    MCV 95.1 80.0 - 99.0 FL MCH 31.5 26.0 - 34.0 PG    MCHC 33.1 30.0 - 36.5 g/dL    RDW 12.7 11.5 - 14.5 %    PLATELET 840 575 - 172 K/uL    NEUTROPHILS 86 (H) 32 - 75 %    LYMPHOCYTES 9 (L) 12 - 49 %    MONOCYTES 5 5 - 13 %    EOSINOPHILS 0 0 - 7 %    BASOPHILS 0 0 - 1 %    ABS. NEUTROPHILS 16.5 (H) 1.8 - 8.0 K/UL    ABS. LYMPHOCYTES 1.8 0.8 - 3.5 K/UL    ABS. MONOCYTES 1.0 0.0 - 1.0 K/UL    ABS. EOSINOPHILS 0.0 0.0 - 0.4 K/UL    ABS. BASOPHILS 0.0 0.0 - 0.1 K/UL          Assessment:     Principal Problem:    SIRS due to infectious process without acute organ dysfunction ([facility] [address] (9/18/2017)    Active Problems:    Hypokalemia (9/18/2017)      Intractable back pain (9/18/2017)      Obesity (BMI 30-39.9) (9/18/2017)      Bacteriuria (9/18/2017)        Plan:   1. Lumbar epidural abscess   - s/p L3-4 laminectomy with evacuation of epidural abscess 09/19   - Pain control with Percocet PRN, Flexeril PRN   - Continue to monitor HVAC output   - PT/OT evals   - ID following. Nafcillin stopped. Cont Vancomycin   - Pt will need PICC placement    - Contact precautions for MRSA (Bactermia and isolated from intraoperative wound cx)   - Cont bowel regimen    2. MRSA bacteremia   - ID following   - Echo pending   - Contact precautions   - awaiting PICC placement, IV abx/home health orders    3. SIRS   - secondary to #1 and #2    -Hospitalist and ID following    4. Obesity   - BMI 34   - Counseling when appropraite    5. Lupus   - Cont Plaquenil from home   - D/C Prednisone as was only for sciatic pain    6. Hypokalemia   - Potassium repleted   - Hospitalist following    7. Leukocytosis   - due to #1 and #2   - trending down    8. Anxiety   - Cont lexapro, wellbutrin    Activity: up with assist  DVT ppx: SCDs  Dispo: tbd    Plan d/w Dr. Ezra Rilye, nurse.        Ann-Marie Chapman NP

## 2017-09-20 NOTE — PROGRESS NOTES
Bedside and Verbal shift change report given to Nadya Roberts (oncoming nurse) by Latanya Phan (offgoing nurse). Report included the following information SBAR, Kardex, OR Summary, Intake/Output, MAR, Accordion and Recent Results.

## 2017-09-20 NOTE — PROGRESS NOTES
POD1  afeb VSS HV-105 overnight  WBC 19.3  Blood cx - MRSA  Surgical cx - MRSA  Pain improved  Dressing C/D/I  Strength full  S/p: lumbar kirkpatrick and evac abscess  Doing well  ID following

## 2017-09-20 NOTE — PROGRESS NOTES
Millicent Warner is a 27 y. o. with a [facility] of [facility] with [facility] pain for more than a week. Pain radiating down rt buttock X 1 week, Fever X 2 days  Pt says she developed severe low back pain more on the right side 10 days ago and was taking diclofenac. The pain was radiating down her rt buttock. she had gone to [facility] [place] on 9/9/ and was given prednisone 20mg  and sent home. She went to her PCP who asked her to increase the steroids. She returned to ED on 9/18 as she was not getting better. She also started having fever and chills. In the ED she had spinal xray , wbc was > 16 and had fever. She later had MRI which revealed Extensive epidural abscess from the L2 level to the mid L5 in the posterior right spinal canal, likely originating from inflammatory changes in the right L5-S1 facet joint. She was seen by neurosurgeon who asked for MRI of the thoracic and cervical spine because eof pain. Pt was in [facility] [place] in July 2017 with back pain after her 11 yr old son jumped on her back. She had Xray lumbar spine which was normal. She was asked to take analgesics. 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 She says she had a face cyst 3 weeks ago and it was squeezed and purulent discharge was obtained and her PCP gave her bactrim with near resolution. Subjective  Pt doing better  Had surgical drainage/laminectomy yesterday  Sitting in chair  No fever      Objective:   VITALS:    Patient Vitals for the past 12 hrs:   Temp Pulse Resp BP SpO2   09/20/17 1405 99.1 °F (37.3 °C) 85 18 112/62 97 %   09/20/17 0833 98.6 °F (37 °C) 85 18 110/75 99 %        PHYSICAL EXAM:   General:                    Alert cooperative- in some distress  Lumbar surgical dressing+ drain  Lungs:                       B/l air entry. Heart:                                  s1s2  Abdomen:                  Soft,   Extremities:               Extremities normal, atraumatic, no cyanosis. No edema.  No clubbing  Skin: No rashes or lesions.   Not Jaundiced  Lymph:                      Cervical, supraclavicular normal.  Neurologic:                Grossly non-focal     Pertinent Labs   Wbc 19.3  Hb 9.6    Cr 0.83  Bc MRSA- vanco MIC1  Wound culture MRSA        IMAGING RESULTS:       Impression/Recommendation     27 yr female with lupus on plaquenil presents with 1 week h/o rt sided low back pain with radiation      L5-S1 facet joint infection and  epidural abscess extending between l2-l5 due MRSA  S/p laminectomy l3-L4  and drainage of epidural abscess    MRSA bacteremia- on vanco( DEMETRI 1)  DC nafcillin  Repeat blood culture until clear of bacteremia  2d echo- no obvious veg  Will need JOSE      Lupus- on plaquenil- hold until acute infection is resolved    Discussed the management with the patient

## 2017-09-20 NOTE — PROGRESS NOTES
Pharmacist Note - Vancomycin Dosing  Therapy day 2  Indication: Bacteremia / Possible CNS infection  Current regimen: 1250mg IV [id number]    A Trough Level resulted at 11 mcg/mL which was obtained ~8 hrs post-dose. This represents a \"true\" trough based on the patient's known kinetics. Goal trough: 15 - 20 mcg/mL     Plan: Change to 1500mg IV [id number] . Pharmacy will continue to monitor this patient daily for changes in clinical status and renal function.

## 2017-09-20 NOTE — PROGRESS NOTES
Bedside and Verbal shift change report given to [address] [place] [address] (oncoming nurse) by Kaitlin Alvarenga RN (offgoing nurse). Report included the following information SBAR, Kardex, Intake/Output, MAR and Recent Results.

## 2017-09-20 NOTE — PROGRESS NOTES
Problem: Mobility Impaired (Adult and Pediatric)  Goal: *Acute Goals and Plan of Care (Insert Text)  Physical Therapy Goals  Initiated 9/20/2017    1. Patient will move from supine to sit and sit to supine and roll side to side in bed with independence within 4 days. 2. Patient will perform sit to stand with independence within 4 days. 3. Patient will ambulate with independence for 150 feet with the least restrictive device within 4 days. 4. Patient will ascend/descend 13 stairs with 1 handrail(s) with modified independence within 4 days. 5. Patient will verbalize and demonstrate understanding of spinal precautions (No bending, lifting greater than 5 lbs, or twisting; log-roll technique; frequent repositioning as instructed) within 4 days. PHYSICAL THERAPY EVALUATION  Patient: Romina Angulo (35 y.o. female)  Date: 9/20/2017  Primary Diagnosis: Intractable back pain  Leukocytosis  Hypokalemia  LUMBAR ABSCESS  Procedure(s) (LRB):  SPINE LUMBAR LAMINECTOMY L3-L4 WITH EVACUATION OF ABSCESS  (N/A) 1 Day Post-Op   Precautions:   Back,No bending, no lifting greater than 5 lbs, no twisting, log-roll technique, repositioning every 20-30 min except when sleeping      ASSESSMENT :  Based on the objective data described below, the patient presents with decreased activity tolerance due to severe back pain and decreased functional mobility from her baseline level of independence. Provided education regarding the back precautions, log rolling, and sitting restrictions. Pt completed transfer to edge of bed with supervision, sit to stand with CGA, and ambulated with a rolling walker x 25 feet with CGA x 1. Pt's gait is slow due to severe back pain. She was agreeable to remain up in chair following therapy session. Discussed disposition with pt. Pt has 2 small children and they will be staying with their dad. Pt will be alone at home. Encouraged pt to check with family or friends to have some help at home.  Pt will need to be independent at home and will need further education regarding modifying her daily activities while recovering from back surgery. Patient will benefit from skilled intervention to address the above impairments. Patients rehabilitation potential is considered to be Good  Factors which may influence rehabilitation potential include:   [ ]         None noted  [ ]         Mental ability/status  [ ]         Medical condition  [X]         Home/family situation and support systems  [ ]         Safety awareness  [X]         Pain tolerance/management  [ ]         Other:        PLAN :  Recommendations and Planned Interventions:  [X]           Bed Mobility Training             [ ]    Neuromuscular Re-Education  [X]           Transfer Training                   [ ]    Orthotic/Prosthetic Training  [X]           Gait Training                         [ ]    Modalities  [ ]           Therapeutic Exercises           [ ]    Edema Management/Control  [ ]           Therapeutic Activities            [X]    Patient and Family Training/Education  [ ]           Other (comment):     Frequency/Duration: Patient will be followed by physical therapy  twice daily to address goals. Discharge Recommendations: Home Health and To Be Determined  Further Equipment Recommendations for Discharge: reacher, ? Rolling walker       SUBJECTIVE:   Patient stated I will try(sitting in chair).   Reviewed back precautions with pt.        OBJECTIVE DATA SUMMARY:   HISTORY:    Past Medical History:   Diagnosis Date    Lupus ([address]      PVC's (premature ventricular contractions)       Past Surgical History:   Procedure Laterality Date    HX  SECTION   2012    HX ORTHOPAEDIC Bilateral       knee    HX TONSILLECTOMY         Prior Level of Function/Home Situation: independent, reports 1 fall in the past year due to tripping over her cat  Personal factors and/or comorbidities impacting plan of care:      Home Situation  Home Environment: Apartment  # Steps to Enter: 15  Rails to Enter: Yes  Hand Rails : Left  One/Two Story Residence: One story  Living Alone: Yes (has 2 small children who will stay with dad)  Support Systems: [place] [employer] [place], Friends \ neighbors  Patient Expects to be Discharged to[de-identified] Apartment  Tub or Shower Type: Tub     EXAMINATION/PRESENTATION/DECISION MAKING:   Critical Behavior:  Neurologic State: Alert, Appropriate for age  Orientation Level: Oriented X4  Cognition: Appropriate decision making, Appropriate for age attention/concentration, Appropriate safety awareness  Safety/Judgement: Awareness of environment, Lack of insight into deficits  Hearing: Auditory  Auditory Impairment: None  Skin:  Dressing intact     Range Of Motion:  AROM: Within functional limits                       Strength:    Strength: Within functional limits                    Tone & Sensation:   Tone: Normal              Sensation: Intact               Coordination:  Coordination: Within functional limits  Vision:   Acuity: Impaired near vision; Impaired far vision  Functional Mobility:   Bed Mobility:  Supine to Sit: Supervision; Additional time;Assist x1     Scooting: Supervision  Transfers:  Sit to Stand: Contact guard assistance; Additional time;Assist x1  Stand to Sit: Contact guard assistance; Additional time;Assist x1                       Balance:   Sitting: Intact  Standing: Impaired  Standing - Static: Good  Standing - Dynamic : Good (with rolling walker)  Ambulation/Gait Training:  Distance (ft): 25 Feet (ft)  Assistive Device: Gait belt;Walker, rolling  Ambulation - Level of Assistance: Contact guard assistance     Gait Description (WDL): Exceptions to WDL  Gait Abnormalities: Antalgic;Decreased step clearance              Speed/Zehra: Slow  Step Length: Left shortened;Right shortened                    Physical Therapy Evaluation Charge Determination   History Examination Presentation Decision-Making   LOW Complexity : Zero comorbidities / personal factors that will impact the outcome / POC LOW Complexity : 1-2 Standardized tests and measures addressing body structure, function, activity limitation and / or participation in recreation  LOW Complexity : Stable, uncomplicated  LOW Complexity : FOTO score of       Based on the above components, the patient evaluation is determined to be of the following complexity level: LOW      Pain:  Pain Scale 1: Numeric (0 - 10)  Pain Intensity 1: 7  Pain Location 1: Back  Pain Orientation 1: Lower  Pain Description 1: Aching  Pain Intervention(s) 1: Medication (see MAR)  Activity Tolerance:   Fair, limited by severe back pain      After treatment:   [X]         Patient left in no apparent distress sitting up in chair  [ ]         Patient left in no apparent distress in bed  [X]         Call bell left within reach  [X]         Nursing notified  [ ]         Caregiver present  [ ]         Bed alarm activated      COMMUNICATION/EDUCATION:   The patients plan of care was discussed with: Registered Nurse.  [X]         Fall prevention education was provided and the patient/caregiver indicated understanding. [ ]         Patient/family have participated as able in goal setting and plan of care. [X]         Patient/family agree to work toward stated goals and plan of care. [ ]         Patient understands intent and goals of therapy, but is neutral about his/her participation. [ ]         Patient is unable to participate in goal setting and plan of care.      Thank you for this referral.  Kemi Downing Core   Time Calculation: 13 mins

## 2017-09-20 NOTE — PROGRESS NOTES
Hospitalist Progress Note  Office: [phone]      Date of Service:  2017  NAME:  Rustam Otero  :  1985  MRN:  [id number]      Admission Summary:   28 yo woman with lupus and obesity presented to the ED from home on 17 with back pain radiating to the right buttock and right leg. She was diagnosed with sciatica on 17. She was admitted inpatient medical floor for SIRS. Interval history / Subjective:   F/u back pain  Returned from surgery  Currently sleeping comfortably without any pain     Assessment & Plan:     Lumbar epidural abscess  - CT abd/pelvis  nonobstructing L kidney stone otherwise no acute  - MRI lumbar  Extensive epidural abscess from the L2 level to the mid L5 in the posterior  right spinal canal, likely originating from inflammatory changes in the right L5-S1 facet joint. At least moderate thecal sac stenosis due to the collection. Soft tissue edema and small fluid collection abutting the posterior aspect of the right L5-S1 facet joint.   Probable small right psoas abscess  -MRI thoracic and cervical spine unremarkable  - BCx  suggestive of MRSA  -Repeat blood culture  MRSA  -Wound culture MRSA  - on vanc/Nafcillin started , now Nafcillin discontinued by ID  - s/p SPINE LUMBAR LAMINECTOMY L3-L4 WITH EVACUATION OF ABSCESS   -follow culture, continue Vanc  -Avoid PICC till blood culture negative  -follow ID/Neurosurgery    Sepsis (POA)  -sec to above  -Awaiting echo  -May need JOSE as well    Back pain with lumbar radiculopathy and recent dx sciatica (POA)  - pain control with scheduled TID ibuprofen, lidocaine patch, gabapentin; prn Percocet  - [facility]  and last had Percocet 5/325 #20 ordered on 17 by PCP  - dx sciatica recently at [facility], sent home with prednisone but no improvement  - PT/OT evals  -Prednisone discontinued    Acute blood loss anemia  -from surgery  -Monitor h/h for now, no need for transfusion for now    Lupus   - resume home Plaquenil  - Rheum is Dr Feli Blanton at [facility]    Obesity, Body mass index is 34.22 kg/(m^2). Hypokalemia (POA) - replete prn    Regular diet    PT/OT home health     Code status: full  DVT prophylaxis: SCDs  PTA: home    Plan: Follow cultures, ID, Neurosurgery    Care Plan discussed with: Patient/Family, Nurse and   Disposition: Home with family when stable     Hospital Problems  Date Reviewed: 9/18/2017          Codes Class Noted POA    Hypokalemia ICD-10-CM: E87.6  ICD-9-CM: 276.8  9/18/2017 Unknown        Intractable back pain ICD-10-CM: M54.9  ICD-9-CM: 724.5  9/18/2017 Unknown        * (Principal)SIRS due to infectious process without acute organ dysfunction ([facility] [address] ICD-10-CM: A41.9  ICD-9-CM: 038.9, 995.91  9/18/2017 Yes        Obesity (BMI 30-39.9) (Chronic) ICD-10-CM: E66.9  ICD-9-CM: 278.00  9/18/2017 Yes        Bacteriuria ICD-10-CM: R82.71  ICD-9-CM: 791.9  9/18/2017 Yes            Review of Systems:   Pertinent items are noted in HPI. Vital Signs:    Last 24hrs VS reviewed since prior progress note.  Most recent are:  Visit Vitals    /75 (BP 1 Location: Right arm, BP Patient Position: At rest)    Pulse 85    Temp 98.6 °F (37 °C)    Resp 18    Ht 5' 6\" (1.676 m)    Wt 96.2 kg (212 lb)    SpO2 99%    BMI 34.22 kg/m2       Intake/Output Summary (Last 24 hours) at 09/20/17 0915  Last data filed at 09/20/17 0300   Gross per 24 hour   Intake             2000 ml   Output             1530 ml   Net              470 ml        Physical Examination:     Constitutional:  awake, no acute distress, cooperative, pleasant, tearful, obese   ENT:  Oral mucosa moist, oropharynx benign  Neck supple, no masses   Resp:  CTA bilaterally, no wheezing/rhonchi/rales   CV:  regular rhythm, tachycardic, no m/r/g appreciated, no peripheral edema, +pulses    GI:  +BS, soft, non distended, non tender, obese Musculoskeletal:  moves all extremities but reduced ROM b/l LEs; straight leg raise + on right    Neurologic:  AAOx3, NFD     Skin:  warm, dry  Eyes:  PERRL    Data Review:    Review and/or order of clinical lab test  Review and/or order of tests in the radiology section of CPT  Review and/or order of tests in the medicine section of [facility]    Labs:     Recent Labs      09/20/17 0457 09/19/17 0355   WBC  19.3*  21.8*   HGB  9.6*  11.5   HCT  29.0*  34.8*   PLT  294  266     Recent Labs      09/20/17 0457 09/19/17 0355 09/18/17 0228   NA  142  139  138   K  3.7  3.9  3.4*   CL  108  109*  102   CO2  28  21  27   BUN  10  9  16   CREA  0.83  0.64  0.72   GLU  139*  97  98   CA  8.4*  8.7  8.7     Recent Labs      09/18/17 0228   SGOT  15   ALT  63   AP  80   TBILI  0.4   TP  7.1   ALB  3.0*   GLOB  4.1*   LPSE  145     Recent Labs      09/18/17 1919   INR  1.1   PTP  11.4*   APTT  35.7*      No results for input(s): FE, TIBC, PSAT, FERR in the last 72 hours. No results found for: FOL, RBCF   No results for input(s): PH, PCO2, PO2 in the last 72 hours. No results for input(s): CPK, CKNDX, TROIQ in the last 72 hours.     No lab exists for component: CPKMB  No results found for: CHOL, CHOLX, CHLST, CHOLV, HDL, LDL, LDLC, DLDLP, TGLX, TRIGL, TRIGP, CHHD, CHHDX  No results found for: [facility]  Lab Results   Component Value Date/Time    Color YELLOW/STRAW 09/18/2017 03:32 AM    Appearance CLEAR 09/18/2017 03:32 AM    Specific gravity 1.030 09/18/2017 03:32 AM    Specific gravity 1.024 03/20/2015 02:41 PM    pH (UA) 6.0 09/18/2017 03:32 AM    Protein NEGATIVE  09/18/2017 03:32 AM    Glucose NEGATIVE  09/18/2017 03:32 AM    Ketone NEGATIVE  09/18/2017 03:32 AM    Bilirubin NEGATIVE  09/18/2017 03:32 AM    Urobilinogen 0.2 09/18/2017 03:32 AM    Nitrites NEGATIVE  09/18/2017 03:32 AM    Leukocyte Esterase NEGATIVE  09/18/2017 03:32 AM    Epithelial cells MANY 09/18/2017 03:32 AM    Bacteria 1+ 09/18/2017 03:32 AM    WBC 0-4 09/18/2017 03:32 AM    RBC 0-5 09/18/2017 03:32 AM     Medications Reviewed:     Current Facility-Administered Medications   Medication Dose Route Frequency    nafcillin (NALLPEN) 2 g in 0.9% sodium chloride (MBP/ADV) 100 mL  2 g IntraVENous Q4H    Vancomycin - Pharmacy to Dose   Other Rx Dosing/Monitoring    vancomycin (VANCOCIN) 1250 mg in  ml infusion  1,250 mg IntraVENous Q8H    sodium chloride (NS) flush 5-10 mL  5-10 mL IntraVENous Q8H    sodium chloride (NS) flush 5-10 mL  5-10 mL IntraVENous PRN    acetaminophen (TYLENOL) tablet 650 mg  650 mg Oral Q4H PRN    HYDROcodone-acetaminophen (NORCO) 5-325 mg per tablet 1 Tab  1 Tab Oral Q4H PRN    morphine injection 2 mg  2 mg IntraVENous Q4H PRN    ondansetron (ZOFRAN) injection 4 mg  4 mg IntraVENous Q4H PRN    phenol throat spray (CHLORASEPTIC) 1 Spray  1 Spray Oral PRN    diphenhydrAMINE (BENADRYL) capsule 25 mg  25 mg Oral Q4H PRN    oxyCODONE-acetaminophen (PERCOCET) 5-325 mg per tablet 1 Tab  1 Tab Oral Q4H PRN    cyclobenzaprine (FLEXERIL) tablet 10 mg  10 mg Oral TID PRN    sodium chloride (NS) flush 5-10 mL  5-10 mL IntraVENous Q8H    sodium chloride (NS) flush 5-10 mL  5-10 mL IntraVENous PRN    topiramate (TOPAMAX) tablet 50 mg  50 mg Oral BID    hydroxychloroquine (PLAQUENIL) tablet 200 mg  200 mg Oral BID    gabapentin (NEURONTIN) capsule 100 mg  100 mg Oral TID    senna-docusate (PERICOLACE) 8.6-50 mg per tablet 1 Tab  1 Tab Oral DAILY    escitalopram oxalate (LEXAPRO) tablet 10 mg  10 mg Oral DAILY    buPROPion SR (WELLBUTRIN SR) tablet 150 mg  150 mg Oral BID    oxyCODONE-acetaminophen (PERCOCET) 5-325 mg per tablet 1 Tab  1 Tab Oral Q6H PRN    morphine injection 2 mg  2 mg IntraVENous Q4H PRN    butalbital-acetaminophen-caffeine (FIORICET, ESGIC) -40 mg per tablet 1 Tab  1 Tab Oral Q6H PRN     ______________________________________________________________________  EXPECTED LENGTH OF STAY: 3d 2h  ACTUAL LENGTH OF STAY:          2                 Michael Henderson MD

## 2017-09-21 LAB
ANION GAP SERPL CALC-SCNC: 10 MMOL/L (ref 5–15)
BACTERIA SPEC CULT: ABNORMAL
BACTERIA SPEC CULT: ABNORMAL
BACTERIA SPEC CULT: NORMAL
BASOPHILS # BLD: 0 K/UL (ref 0–0.1)
BASOPHILS NFR BLD: 0 % (ref 0–1)
BUN SERPL-MCNC: 7 MG/DL (ref 6–20)
BUN/CREAT SERPL: 10 (ref 12–20)
CALCIUM SERPL-MCNC: 8.2 MG/DL (ref 8.5–10.1)
CHLORIDE SERPL-SCNC: 108 MMOL/L (ref 97–108)
CO2 SERPL-SCNC: 21 MMOL/L (ref 21–32)
CREAT SERPL-MCNC: 0.69 MG/DL (ref 0.55–1.02)
EOSINOPHIL # BLD: 0 K/UL (ref 0–0.4)
EOSINOPHIL NFR BLD: 0 % (ref 0–7)
ERYTHROCYTE [DISTWIDTH] IN BLOOD BY AUTOMATED COUNT: 13 % (ref 11.5–14.5)
GLUCOSE SERPL-MCNC: 98 MG/DL (ref 65–100)
GRAM STN SPEC: ABNORMAL
HCT VFR BLD AUTO: 31.8 % (ref 35–47)
HGB BLD-MCNC: 10.4 G/DL (ref 11.5–16)
LYMPHOCYTES # BLD: 2 K/UL (ref 0.8–3.5)
LYMPHOCYTES NFR BLD: 12 % (ref 12–49)
MCH RBC QN AUTO: 31.4 PG (ref 26–34)
MCHC RBC AUTO-ENTMCNC: 32.7 G/DL (ref 30–36.5)
MCV RBC AUTO: 96.1 FL (ref 80–99)
MONOCYTES # BLD: 1 K/UL (ref 0–1)
MONOCYTES NFR BLD: 6 % (ref 5–13)
NEUTS SEG # BLD: 13.7 K/UL (ref 1.8–8)
NEUTS SEG NFR BLD: 82 % (ref 32–75)
PLATELET # BLD AUTO: 322 K/UL (ref 150–400)
POTASSIUM SERPL-SCNC: 3.5 MMOL/L (ref 3.5–5.1)
RBC # BLD AUTO: 3.31 M/UL (ref 3.8–5.2)
SERVICE CMNT-IMP: ABNORMAL
SERVICE CMNT-IMP: ABNORMAL
SERVICE CMNT-IMP: NORMAL
SODIUM SERPL-SCNC: 139 MMOL/L (ref 136–145)
WBC # BLD AUTO: 16.8 K/UL (ref 3.6–11)

## 2017-09-21 PROCEDURE — 74011250637 HC RX REV CODE- 250/637: Performed by: INTERNAL MEDICINE

## 2017-09-21 PROCEDURE — 74011250636 HC RX REV CODE- 250/636: Performed by: INTERNAL MEDICINE

## 2017-09-21 PROCEDURE — 80048 BASIC METABOLIC PNL TOTAL CA: CPT | Performed by: HOSPITALIST

## 2017-09-21 PROCEDURE — 85025 COMPLETE CBC W/AUTO DIFF WBC: CPT | Performed by: HOSPITALIST

## 2017-09-21 PROCEDURE — 97530 THERAPEUTIC ACTIVITIES: CPT | Performed by: PHYSICAL THERAPIST

## 2017-09-21 PROCEDURE — 36415 COLL VENOUS BLD VENIPUNCTURE: CPT | Performed by: HOSPITALIST

## 2017-09-21 PROCEDURE — 97535 SELF CARE MNGMENT TRAINING: CPT

## 2017-09-21 PROCEDURE — 65270000032 HC RM SEMIPRIVATE

## 2017-09-21 PROCEDURE — 74011250637 HC RX REV CODE- 250/637: Performed by: NURSE PRACTITIONER

## 2017-09-21 PROCEDURE — 87040 BLOOD CULTURE FOR BACTERIA: CPT | Performed by: INTERNAL MEDICINE

## 2017-09-21 PROCEDURE — 74011250636 HC RX REV CODE- 250/636: Performed by: NURSE PRACTITIONER

## 2017-09-21 PROCEDURE — 97116 GAIT TRAINING THERAPY: CPT | Performed by: PHYSICAL THERAPIST

## 2017-09-21 RX ORDER — KETOROLAC TROMETHAMINE 30 MG/ML
30 INJECTION, SOLUTION INTRAMUSCULAR; INTRAVENOUS
Status: COMPLETED | OUTPATIENT
Start: 2017-09-21 | End: 2017-09-22

## 2017-09-21 RX ORDER — GABAPENTIN 100 MG/1
200 CAPSULE ORAL 3 TIMES DAILY
Status: DISCONTINUED | OUTPATIENT
Start: 2017-09-21 | End: 2017-09-25 | Stop reason: HOSPADM

## 2017-09-21 RX ADMIN — MORPHINE SULFATE 2 MG: 2 INJECTION, SOLUTION INTRAMUSCULAR; INTRAVENOUS at 06:09

## 2017-09-21 RX ADMIN — KETOROLAC TROMETHAMINE 30 MG: 30 INJECTION, SOLUTION INTRAMUSCULAR at 19:27

## 2017-09-21 RX ADMIN — Medication 5 ML: at 13:56

## 2017-09-21 RX ADMIN — TOPIRAMATE 50 MG: 25 TABLET, FILM COATED ORAL at 19:27

## 2017-09-21 RX ADMIN — VANCOMYCIN HYDROCHLORIDE 1500 MG: 10 INJECTION, POWDER, LYOPHILIZED, FOR SOLUTION INTRAVENOUS at 02:32

## 2017-09-21 RX ADMIN — GABAPENTIN 200 MG: 100 CAPSULE ORAL at 22:39

## 2017-09-21 RX ADMIN — OXYCODONE HYDROCHLORIDE AND ACETAMINOPHEN 2 TABLET: 5; 325 TABLET ORAL at 16:26

## 2017-09-21 RX ADMIN — Medication 5 ML: at 13:55

## 2017-09-21 RX ADMIN — DOCUSATE SODIUM AND SENNOSIDES 1 TABLET: 8.6; 5 TABLET, FILM COATED ORAL at 09:33

## 2017-09-21 RX ADMIN — OXYCODONE HYDROCHLORIDE AND ACETAMINOPHEN 2 TABLET: 5; 325 TABLET ORAL at 22:43

## 2017-09-21 RX ADMIN — GABAPENTIN 200 MG: 100 CAPSULE ORAL at 09:33

## 2017-09-21 RX ADMIN — BUPROPION HYDROCHLORIDE 150 MG: 150 TABLET, EXTENDED RELEASE ORAL at 17:51

## 2017-09-21 RX ADMIN — KETOROLAC TROMETHAMINE 30 MG: 30 INJECTION, SOLUTION INTRAMUSCULAR at 10:58

## 2017-09-21 RX ADMIN — BUPROPION HYDROCHLORIDE 150 MG: 150 TABLET, EXTENDED RELEASE ORAL at 09:33

## 2017-09-21 RX ADMIN — TOPIRAMATE 50 MG: 25 TABLET, FILM COATED ORAL at 09:33

## 2017-09-21 RX ADMIN — Medication 1 CAPSULE: at 09:33

## 2017-09-21 RX ADMIN — Medication 10 ML: at 22:40

## 2017-09-21 RX ADMIN — VANCOMYCIN HYDROCHLORIDE 1500 MG: 10 INJECTION, POWDER, LYOPHILIZED, FOR SOLUTION INTRAVENOUS at 19:22

## 2017-09-21 RX ADMIN — Medication 10 ML: at 22:00

## 2017-09-21 RX ADMIN — VANCOMYCIN HYDROCHLORIDE 1500 MG: 10 INJECTION, POWDER, LYOPHILIZED, FOR SOLUTION INTRAVENOUS at 10:58

## 2017-09-21 RX ADMIN — OXYCODONE HYDROCHLORIDE AND ACETAMINOPHEN 2 TABLET: 5; 325 TABLET ORAL at 00:33

## 2017-09-21 RX ADMIN — GABAPENTIN 200 MG: 100 CAPSULE ORAL at 16:26

## 2017-09-21 RX ADMIN — Medication 10 ML: at 04:55

## 2017-09-21 RX ADMIN — OXYCODONE HYDROCHLORIDE AND ACETAMINOPHEN 2 TABLET: 5; 325 TABLET ORAL at 12:24

## 2017-09-21 RX ADMIN — OXYCODONE HYDROCHLORIDE AND ACETAMINOPHEN 1 TABLET: 5; 325 TABLET ORAL at 07:36

## 2017-09-21 RX ADMIN — ESCITALOPRAM OXALATE 10 MG: 10 TABLET ORAL at 09:33

## 2017-09-21 NOTE — ADVANCED PRACTICE NURSE
JOSE consult request.     Patient: Lucero Barrera  : 1985  Date: 17    Requesting MD: Benedicto Padilla NP    Consult request: JOSE     History: Lumbar epidural abscess, s/p L3-4 laminectomy with evac. MRSA bacteremia    Subjective:  Patient denies difficulty swallowing.     Discussed risks and benefits of JOSE (transesophageal echocardiogram) as well as the procedure itself. Risk is 1 in 10,000 of heart attack, stroke, esophageal perforation or death. The patient had the opportunity to ask questions and have all questions answered to their satisfaction. patient agrees to proceed with procedure. Visit Vitals    /80 (BP 1 Location: Left arm, BP Patient Position: At rest)    Pulse 90    Temp 97.8 °F (36.6 °C)    Resp 18    Ht 5' 6\" (1.676 m)    Wt 96.2 kg (212 lb)    SpO2 99%    BMI 34.22 kg/m2       Physical Exam:  General: AAO x 3, in no distress  Lungs: CTAB  Cardiac: RRR, No GMR  Abdomen: Soft, bowel sound present  Neuro: CNII-XII grossly intact. Assessment:    1. MRSA Bacteremia    - R/o vegetation     Plan:    1. JOSE scheduled for 1045 on 17. NPO after midnight, except medications.     Leo Hendricks PA-C

## 2017-09-21 NOTE — PROGRESS NOTES
POD 2  No complaints  afeb VSS  incision without erythema/edema/drainage  hemovac d/c'ed  doing well  encourage OOB, and PT

## 2017-09-21 NOTE — PROGRESS NOTES
Neurosurgery Progress Note  [place], [place]  [phone]        Admit Date: 2017   LOS: 3 days        Daily Progress Note: 2017    POD: Postoperative day #2    S/P: Procedure(s):  SPINE LUMBAR LAMINECTOMY L3-L4 WITH EVACUATION OF ABSCESS     Subjective: The patient developed severe lower back pain and sciatic pain over a week ago. She denies accident or injury to her back. She went to an urgent care clinic and was given steroids/Toradol without relief. She developed a fever and chills with worsening pain, so she presented to the ER. She also had increasing cervical and thoracic pain. Imaging of her spine revealed a lumbar epidural abscess. She does have a history of lupus. She takes Plaquenil on a regular basis. She states she does not normally take prednisone. She was placed on this last week by urgent care for her sciatic pain. She is now postop day #2 from evacuation of lumbar epidural abscess. She had a difficult night due to pain and states she is miserable this morning. She states her right leg symptoms have returned with pain shooting down the lateral portion of her right leg to her knee. Strength 5/5 in lower extremities. HVAC with 40 cc output today. No drainage from incision or significant surrounding erythema or edema. MRSA isolated from intraoperative cultures. JOSE pending to look for valvular vegetation. The MRI scans of her cervical spine and thoracic spine without evidence of epidural abscess. Denies  chest pain, nausea, vomiting, difficulty swallowing, headache, and dyspnea.        Objective:     Vital signs  Temp (24hrs), Av.1 °F (37.3 °C), Min:97.8 °F (36.6 °C), Max:99.8 °F (37.7 °C)   701 - 1900  In: -   Out: 800 [Urine:800]  1901 -  0700  In: -   Out: 5334 [Urine:2700; Drains:180]    Visit Vitals    /80 (BP 1 Location: Left arm, BP Patient Position: At rest)    Pulse 90    Temp 97.8 °F (36.6 °C)    Resp 18    Ht 5' 6\" (1.676 m)    Wt 96.2 kg (212 lb)    SpO2 99%    BMI 34.22 kg/m2    O2 Flow Rate (L/min): 2 l/min O2 Device: Room air     Pain control  Pain Assessment  Pain Scale 1: Numeric (0 - 10)  Pain Intensity 1: 9  Pain Onset 1: acute  Pain Location 1: Back  Pain Orientation 1: Posterior, Right  Pain Description 1: Cramping, Shooting, Sharp  Pain Intervention(s) 1: Medication (see MAR)    PT/OT  Gait     Gait  Speed/Zehra: Pace decreased (<100 feet/min), Shuffled, Slow  Step Length: Left shortened, Right shortened  Gait Abnormalities: Antalgic, Decreased step clearance, Shuffling gait  Ambulation - Level of Assistance: Contact guard assistance  Distance (ft): 15 Feet (ft) (x 2)  Assistive Device: Gait belt, Walker, rolling           Physical Exam:  Gen:NAD. Neuro: A&Ox3. Follows commands. Speech clear. Affect normal.  KISER. Strength 5/5 in UE and LE BL. Gait deferred. Skin: Lumbar dressing C/D/I. HVAC with serosanguineous drainage    MRI L-spine with and without contrast on 09/18/17 shows Extensive epidural abscess from the L2 level to the mid L5 in the posterior right spinal canal, likely originating from inflammatory changes in the right  L5-S1 facet joint. At least moderate thecal sac stenosis due to the collection. Soft tissue edema and small fluid collection abutting the posterior aspect of the right L5-S1 facet joint. Probable small right psoas abscess.     24 hour results:    Recent Results (from the past 24 hour(s))   VANCOMYCIN, TROUGH    Collection Time: 09/20/17  4:34 PM   Result Value Ref Range    Vancomycin,trough 11.0 (H) 5.0 - 10.0 ug/mL    Reported dose date: NOT PROVIDED      Reported dose time: NOT PROVIDED      Reported dose: NOT PROVIDED UNITS   CBC WITH AUTOMATED DIFF    Collection Time: 09/21/17  4:49 AM   Result Value Ref Range    WBC 16.8 (H) 3.6 - 11.0 K/uL    RBC 3.31 (L) 3.80 - 5.20 M/uL    HGB 10.4 (L) 11.5 - 16.0 g/dL    HCT 31.8 (L) 35.0 - 47.0 %    MCV 96.1 80.0 - 99.0 FL    MCH 31.4 26.0 - 34.0 PG    MCHC 32.7 30.0 - 36.5 g/dL    RDW 13.0 11.5 - 14.5 %    PLATELET 174 916 - 030 K/uL    NEUTROPHILS 82 (H) 32 - 75 %    LYMPHOCYTES 12 12 - 49 %    MONOCYTES 6 5 - 13 %    EOSINOPHILS 0 0 - 7 %    BASOPHILS 0 0 - 1 %    ABS. NEUTROPHILS 13.7 (H) 1.8 - 8.0 K/UL    ABS. LYMPHOCYTES 2.0 0.8 - 3.5 K/UL    ABS. MONOCYTES 1.0 0.0 - 1.0 K/UL    ABS. EOSINOPHILS 0.0 0.0 - 0.4 K/UL    ABS. BASOPHILS 0.0 0.0 - 0.1 K/UL   METABOLIC PANEL, BASIC    Collection Time: 09/21/17  4:49 AM   Result Value Ref Range    Sodium 139 136 - 145 mmol/L    Potassium 3.5 3.5 - 5.1 mmol/L    Chloride 108 97 - 108 mmol/L    CO2 21 21 - 32 mmol/L    Anion gap 10 5 - 15 mmol/L    Glucose 98 65 - 100 mg/dL    BUN 7 6 - 20 MG/DL    Creatinine 0.69 0.55 - 1.02 MG/DL    BUN/Creatinine ratio 10 (L) 12 - 20      GFR est AA >60 >60 ml/min/1.73m2    GFR est non-AA >60 >60 ml/min/1.73m2    Calcium 8.2 (L) 8.5 - 10.1 MG/DL          Assessment:     Principal Problem:    SIRS due to infectious process without acute organ dysfunction (HCC) (9/18/2017)    Active Problems:    Hypokalemia (9/18/2017)      Intractable back pain (9/18/2017)      Obesity (BMI 30-39.9) (9/18/2017)      Bacteriuria (9/18/2017)        Plan:   1. Lumbar epidural abscess   - s/p L3-4 laminectomy with evacuation of epidural abscess 09/19   - Pain control with Percocet PRN, Flexeril PRN. Increase Gabapentin dose, added Toradol.   - Continue to monitor HVAC output   - PT/OT evals   - ID following. Nafcillin stopped. Cont Vancomycin   - Pt will need PICC placement once blood cultures remain negative   - Contact precautions for MRSA (Bactermia and isolated from intraoperative wound cx)   - Cont bowel regimen    2. MRSA bacteremia   - ID following   - JOSE pending   - Contact precautions   - awaiting PICC placement once blood cultures negative    3. SIRS ->resolved   - secondary to #1 and #2    - Hospitalist and ID following    4. Obesity   - BMI 34   - Counseling when appropraite    5. Lupus   - ID recommends discontinuing plaquenil for now. - Her rheumatologist is Kyra Ellsworth NP with [facility]   - Prednisone which was only prescribed for her sciatica outpatient, was discontinued    6. Hypokalemia   - Potassium repleted   - Hospitalist following    7. Leukocytosis   - due to #1 and #2   - trending down    8. Anxiety   - Cont lexapro, wellbutrin    Activity: up with assist  DVT ppx: SCDs  Dispo: tbd    Plan d/w Dr. Elise Mckenzie, nurse.        Raquel Rodriguez NP

## 2017-09-21 NOTE — PROGRESS NOTES
José Miguel Aiken is a 27 y. o. with a history of SLE followed at [facility] presents with back pain for more than a week. Pain radiating down rt buttock X 1 week, Fever X 2 days  Pt says she developed severe low back pain more on the right side 10 days ago and was taking diclofenac. The pain was radiating down her rt buttock. she had gone to [facility] on 9/9/ and was given prednisone 20mg  and sent home. She went to her PCP who asked her to increase the steroids. She returned to ED on 9/18 as she was not getting better. She also started having fever and chills. In the ED she had spinal xray , wbc was > 16 and had fever. She later had MRI which revealed Extensive epidural abscess from the L2 level to the mid L5 in the posterior right spinal canal, likely originating from inflammatory changes in the right L5-S1 facet joint. She was seen by neurosurgeon who asked for MRI of the thoracic and cervical spine because eof pain. Pt was in [facility] in July 2017 with back pain after her 11 yr old son jumped on her back. She had Xray lumbar spine which was normal. She was asked to take analgesics. 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 She says she had a face cyst 3 weeks ago and it was squeezed and purulent discharge was obtained and her PCP gave her bactrim with near resolution. Subjective  Feeling better  Drain removed today    Objective:   VITALS:    Patient Vitals for the past 12 hrs:   Temp Pulse Resp BP SpO2   09/21/17 1443 98.5 °F (36.9 °C) 96 18 120/79 98 %   09/21/17 0828 97.8 °F (36.6 °C) 90 18 132/80 99 %        PHYSICAL EXAM:   General:                    Alert   Drain out  Lungs:                       B/l air entry. Heart:                                  s1s2  Abdomen:                  Soft,   Extremities:               Extremities normal, atraumatic, no cyanosis. No edema. No clubbing  Skin:                                    No rashes or lesions.   Not Jaundiced  Lymph: Cervical, supraclavicular normal.  Neurologic:                Grossly non-focal     Pertinent Labs   Wbc 19.3>16.8  Hb 10.4    Cr 0.83>.0.69  Bc MRSA- vanco MIC1  Wound culture MRSA        IMAGING RESULTS:       Impression/Recommendation     27 yr female with lupus on plaquenil presents with 1 week h/o rt sided low back pain with radiation      L5-S1 facet joint infection and  epidural abscess extending between l2-l5 due MRSA  S/p laminectomy l3-L4  and drainage of epidural abscess    MRSA bacteremia- on vanco( DEMETRI 1)  Repeat blood culture until clear of bacteremia- PICC can be palced only after 2 blood cultures are negative  2d echo- no obvious veg  Will need JOSE      Lupus- on plaquenil- hold until acute infection is resolved    Discussed the management with the patient and her nurse

## 2017-09-21 NOTE — PROGRESS NOTES
Problem: Self Care Deficits Care Plan (Adult)  Goal: *Acute Goals and Plan of Care (Insert Text)  Occupational Therapy Goals  Initiated 9/20/2017  1. Patient will perform lower body dressing with modified independence using AE PRN within 7 days. 2. Patient will perform toileting with modified independence using most appropriate DME within 7 days. 3. Patient will perform standing 5 mins at modified independence within 7 days. 4. Patient will perform gathering ADL items high and low without cues safety, technique or tool use 4/4 at modified independence within 7 days. 5. Patient will verbalize/demonstrate 3/3 back precautions during ADL tasks without cues within 7 days. OCCUPATIONAL THERAPY TREATMENT  Patient: Carli Preston (34 y.o. female)  Date: 9/21/2017  Diagnosis: Intractable back pain  Leukocytosis  Hypokalemia  LUMBAR ABSCESS SIRS due to infectious process without acute organ dysfunction (HCC)  Procedure(s) (LRB):  SPINE LUMBAR LAMINECTOMY L3-L4 WITH EVACUATION OF ABSCESS  (N/A) 2 Days Post-Op  Precautions: Back      ASSESSMENT:  Patient progressing in all areas, supine now minimal pain, sit<>stand supervision, standing tolerance 5 mins with good balance, upper body dressing setup to min A, lower body setup to min A. ADLs limited still by standing tolerance, balance requiring use of RW and pain management. Next session gathering ADLs. Recommend with nursing patient to complete as able in order to maintain strength, endurance and independence: ADLs with supervision/setup, OOB to chair 3x/day and mobilizing to the bathroom for toileting without assist. Thank you for your assistance.       Progression toward goals:  [X]       Improving appropriately and progressing toward goals  [ ]       Improving slowly and progressing toward goals  [ ]       Not making progress toward goals and plan of care will be adjusted       PLAN:  Patient continues to benefit from skilled intervention to address the above impairments. Continue treatment per established plan of care. Discharge Recommendations:  None  Further Equipment Recommendations for Discharge:  reacher       SUBJECTIVE:   Patient stated I have been up moving.       OBJECTIVE DATA SUMMARY:   Cognitive/Behavioral Status:  Neurologic State: Alert  Orientation Level: Oriented X4  Cognition: Appropriate for age attention/concentration; Appropriate decision making; Appropriate safety awareness  Perception: Appears intact  Perseveration: No perseveration noted  Safety/Judgement: Awareness of environment;Good awareness of safety precautions (3/3 precautions demonstrated)     Functional Mobility and Transfers for ADLs:  Bed Mobility:  Rolling: Supervision  Supine to Sit: Supervision  Sit to Supine: Supervision  Scooting: Supervision     Transfers:  Sit to Stand: Supervision        Balance:  Sitting: Intact  Standing: Intact; With support     ADL Intervention:                       Patient received supine, just finished bathing sitting up in chair. Upper Body Dressing Assistance  Front Opened Shirt: Minimum assistance Donned standing, A 2* patient trying not to twist.   Patient demonstrated pull over shirt with setup beside her only. Lower Body Dressing Assistance  Slip on Shoes with Back: Modified independent   Patient with underpants already on, declined demonstration at this time with reacher 2* stating able to complete. Patient stating she is completing functional mobility to and from bathroom, toilet height is still an issue pain wise. Tech to bring patient a BSC to place over commode for ease and pain management. Cognitive Retraining  Safety/Judgement: Awareness of environment;Good awareness of safety precautions (3/3 precautions demonstrated)     Neuro Re-Education:           Therapeutic Exercises:   Patient demonstrated v. Gravity 5 reps and 2 set(s) while standing with supevision.  Patient demonstrated techniques of activating abdomen and pelvic floor muscles with one cue. Pain:  Pain Scale 1: Numeric (0 - 10)  Pain Intensity 1: 3  Pain Location 1: Back     Pain Description 1: Cramping; Shooting  Pain Intervention(s) 1: Medication (see MAR)  Activity Tolerance:   VSS  Please refer to the flowsheet for vital signs taken during this treatment.   After treatment:   [ ] Patient left in no apparent distress sitting up in chair  [X] Patient left in no apparent distress in bed  [X] Call bell left within reach  [X] Nursing notified  [X] Caregiver present  [ ] Bed alarm activated      COMMUNICATION/COLLABORATION:   The patients plan of care was discussed with: Physical Therapist and Registered Nurse     Maura Gibbs  Time Calculation: 22 mins

## 2017-09-21 NOTE — PROGRESS NOTES
Bedside shift change report given to [facility] REMY (oncoming nurse) by Fatemeh Hendrickson (offgoing nurse). Report included the following information SBAR.     0301- Dr. Joseph Ha called for cardiology consult. 1359- Spoke to Dr. Saurabh Sevilla and Dr. Maile Collado to inform of scheduled JOSE tomorrow. Consult form signed. Also Dr. Saurabh Sevilla informed of positive MRSA culture result in wound already being treated with Vanc. 1- Paged Dr. Ny Lowery ( ID) concerning blood culture order clarification.

## 2017-09-21 NOTE — PROGRESS NOTES
Hospitalist Progress Note  Office: [phone]  Cell: [phone]      Date of Service:  2017  NAME:  Jun Christian  :  1985  MRN:  [id number]      Admission Summary:   28 yo woman with lupus and obesity presented to the ED from home on 17 with back pain radiating to the right buttock and right leg. She was diagnosed with sciatica on 17. She was admitted inpatient medical floor for SIRS. Interval history / Subjective:   F/u back pain  Today is claiming that her pain is not under control  Sitting in a chair and having her breakfast     Assessment & Plan:     Lumbar epidural abscess  - CT abd/pelvis  nonobstructing L kidney stone otherwise no acute  - MRI lumbar  Extensive epidural abscess from the L2 level to the mid L5 in the posterior  right spinal canal, likely originating from inflammatory changes in the right L5-S1 facet joint. At least moderate thecal sac stenosis due to the collection. Soft tissue edema and small fluid collection abutting the posterior aspect of the right L5-S1 facet joint.   Probable small right psoas abscess  -MRI thoracic and cervical spine unremarkable  - BCx  suggestive of MRSA  -Repeat blood culture  MRSA  -Wound culture MRSA  - on vanc/Nafcillin started , now Nafcillin discontinued by ID  - s/p SPINE LUMBAR LAMINECTOMY L3-L4 WITH EVACUATION OF ABSCESS   -follow culture, continue Vanc  -Avoid PICC till blood culture negative  -follow ID/Neurosurgery    Sepsis (POA)  -sec to above  -Echo unremarkable  -Will need JOSE, Will talk to ID as well as Neurosurgery if the patient can get it now    Back pain with lumbar radiculopathy and recent dx sciatica (POA)  - pain control with scheduled TID ibuprofen, lidocaine patch, gabapentin; prn Percocet  - [facility]  reviewed  and last had Percocet 5/325 #20 ordered on 17 by PCP  - dx sci[facility]a recently at [facility], sent home with prednisone but no improvement  - PT/OT evals  -Prednisone discontinued    Acute blood loss anemia  -from surgery  -Monitor h/h for now, no need for transfusion for now    Lupus   - resume home Plaquenil  - Rheum is Dr Michael Epperson at [facility]    Obesity, Body mass index is 34.22 kg/(m^2). Hypokalemia (POA) - replete prn    Regular diet    PT/OT home health     Code status: full  DVT prophylaxis: SCDs  PTA: home    Plan: Follow cultures, ID, Neurosurgery. JOSE soon    Care Plan discussed with: Patient/Family, Nurse and   Disposition: Home with family when stable     Hospital Problems  Date Reviewed: 9/18/2017          Codes Class Noted POA    Hypokalemia ICD-10-CM: E87.6  ICD-9-CM: 276.8  9/18/2017 Unknown        Intractable back pain ICD-10-CM: M54.9  ICD-9-CM: 724.5  9/18/2017 Unknown        * (Principal)SIRS due to infectious process without acute organ dysfunction ([facility] [address] ICD-10-CM: A41.9  ICD-9-CM: 038.9, 995.91  9/18/2017 Yes        Obesity (BMI 30-39.9) (Chronic) ICD-10-CM: E66.9  ICD-9-CM: 278.00  9/18/2017 Yes        Bacteriuria ICD-10-CM: R82.71  ICD-9-CM: 791.9  9/18/2017 Yes            Review of Systems:   Pertinent items are noted in HPI. Vital Signs:    Last 24hrs VS reviewed since prior progress note.  Most recent are:  Visit Vitals    /80 (BP 1 Location: Left arm, BP Patient Position: At rest)    Pulse 90    Temp 97.8 °F (36.6 °C)    Resp 18    Ht 5' 6\" (1.676 m)    Wt 96.2 kg (212 lb)    SpO2 99%    BMI 34.22 kg/m2       Intake/Output Summary (Last 24 hours) at 09/21/17 1222  Last data filed at 09/21/17 1058   Gross per 24 hour   Intake                0 ml   Output             1575 ml   Net            -1575 ml        Physical Examination:     Constitutional:  awake, no acute distress, cooperative, pleasant, tearful, obese   ENT:  Oral mucosa moist, oropharynx benign  Neck supple, no masses   Resp:  CTA bilaterally, no wheezing/rhonchi/rales   CV:  regular rhythm, tachycardic, no m/r/g appreciated, no peripheral edema, +pulses    GI:  +BS, soft, non distended, non tender, obese     Musculoskeletal:  moves all extremities but reduced ROM b/l LEs; straight leg raise + on right    Neurologic:  AAOx3, NFD     Skin:  warm, dry  Eyes:  PERRL    Data Review:    Review and/or order of clinical lab test  Review and/or order of tests in the radiology section of CPT  Review and/or order of tests in the medicine section of [facility]    Labs:     Recent Labs      09/21/17 0449 09/20/17 0457   WBC  16.8*  19.3*   HGB  10.4*  9.6*   HCT  31.8*  29.0*   PLT  322  294     Recent Labs      09/21/17 0449 09/20/17 0457 09/19/17   0355   NA  139  142  139   K  3.5  3.7  3.9   CL  108  108  109*   CO2  21  28  21   BUN  7  10  9   CREA  0.69  0.83  0.64   GLU  98  139*  97   CA  8.2*  8.4*  8.7     No results for input(s): SGOT, GPT, ALT, AP, TBIL, TBILI, TP, ALB, GLOB, GGT, AML, LPSE in the last 72 hours. No lab exists for component: AMYP, HLPSE  Recent Labs      09/18/17 1919   INR  1.1   PTP  11.4*   APTT  35.7*      No results for input(s): FE, TIBC, PSAT, FERR in the last 72 hours. No results found for: FOL, RBCF   No results for input(s): PH, PCO2, PO2 in the last 72 hours. No results for input(s): CPK, CKNDX, TROIQ in the last 72 hours.     No lab exists for component: CPKMB  No results found for: CHOL, CHOLX, CHLST, CHOLV, HDL, LDL, LDLC, DLDLP, TGLX, TRIGL, TRIGP, CHHD, CHHDX  No results found for: [facility]  Lab Results   Component Value Date/Time    Color YELLOW/STRAW 09/18/2017 03:32 AM    Appearance CLEAR 09/18/2017 03:32 AM    Specific gravity 1.030 09/18/2017 03:32 AM    Specific gravity 1.024 03/20/2015 02:41 PM    pH (UA) 6.0 09/18/2017 03:32 AM    Protein NEGATIVE  09/18/2017 03:32 AM    Glucose NEGATIVE  09/18/2017 03:32 AM    Ketone NEGATIVE  09/18/2017 03:32 AM    Bilirubin NEGATIVE  09/18/2017 03:32 AM    Urobilinogen 0.2 09/18/2017 03:32 AM    Nitrites NEGATIVE 09/18/2017 03:32 AM    Leukocyte Esterase NEGATIVE  09/18/2017 03:32 AM    Epithelial cells MANY 09/18/2017 03:32 AM    Bacteria 1+ 09/18/2017 03:32 AM    WBC 0-4 09/18/2017 03:32 AM    RBC 0-5 09/18/2017 03:32 AM     Medications Reviewed:     Current Facility-Administered Medications   Medication Dose Route Frequency    gabapentin (NEURONTIN) capsule 200 mg  200 mg Oral TID    [START ON 9/22/2017] Vancomycin Trough - 9/22 @ 0200.    Other ONCE    ketorolac (TORADOL) injection 30 mg  30 mg IntraVENous Q6H PRN    oxyCODONE-acetaminophen (PERCOCET) 5-325 mg per tablet 1-2 Tab  1-2 Tab Oral Q4H PRN    lactobac ac& pc-s.therm-b.anim (ELLEN Q/RISAQUAD)  1 Cap Oral DAILY    vancomycin (VANCOCIN) 1500 mg in  ml infusion  1,500 mg IntraVENous Q8H    Vancomycin - Pharmacy to Dose   Other Rx Dosing/Monitoring    sodium chloride (NS) flush 5-10 mL  5-10 mL IntraVENous Q8H    sodium chloride (NS) flush 5-10 mL  5-10 mL IntraVENous PRN    acetaminophen (TYLENOL) tablet 650 mg  650 mg Oral Q4H PRN    ondansetron (ZOFRAN) injection 4 mg  4 mg IntraVENous Q4H PRN    phenol throat spray (CHLORASEPTIC) 1 Spray  1 Spray Oral PRN    diphenhydrAMINE (BENADRYL) capsule 25 mg  25 mg Oral Q4H PRN    cyclobenzaprine (FLEXERIL) tablet 10 mg  10 mg Oral TID PRN    sodium chloride (NS) flush 5-10 mL  5-10 mL IntraVENous Q8H    sodium chloride (NS) flush 5-10 mL  5-10 mL IntraVENous PRN    topiramate (TOPAMAX) tablet 50 mg  50 mg Oral BID    senna-docusate (PERICOLACE) 8.6-50 mg per tablet 1 Tab  1 Tab Oral DAILY    escitalopram oxalate (LEXAPRO) tablet 10 mg  10 mg Oral DAILY    buPROPion SR (WELLBUTRIN SR) tablet 150 mg  150 mg Oral BID    morphine injection 2 mg  2 mg IntraVENous Q4H PRN    butalbital-acetaminophen-caffeine (FIORICET, ESGIC) -40 mg per tablet 1 Tab  1 Tab Oral Q6H PRN     ______________________________________________________________________  EXPECTED LENGTH OF STAY: 3d 2h  ACTUAL LENGTH OF STAY:          [facility] [facility],[address], [place]

## 2017-09-21 NOTE — PROGRESS NOTES
Patient reports increase in lower back pain that radiates down right leg, the same pain prior to surgery. Rates pain 10/10 after ambulating to bathroom. Tearful. Prn flexeril given as well as percocet and icepacks with no relief. Prn morphine given for breakthrough pain. Pt now reports pain 3/10. Resting quietly. 3810 Left arm sample obtained only for paired blood cultures. Right arm unsuccessful after multiple attempts. Left arm cultures sent to lab. Call received from lab stating order adjustment needed for single blood culture in order to process and when right arm sample collected, paired order will be processed as ordered. Order adjusted. A.M staff to retry to obtain right arm blood cultures.

## 2017-09-21 NOTE — PROGRESS NOTES
Problem: Mobility Impaired (Adult and Pediatric)  Goal: *Acute Goals and Plan of Care (Insert Text)  Physical Therapy Goals  Initiated 9/20/2017    1. Patient will move from supine to sit and sit to supine and roll side to side in bed with independence within 4 days. 2. Patient will perform sit to stand with independence within 4 days. 3. Patient will ambulate with independence for 150 feet with the least restrictive device within 4 days. 4. Patient will ascend/descend 13 stairs with 1 handrail(s) with modified independence within 4 days. 5. Patient will verbalize and demonstrate understanding of spinal precautions (No bending, lifting greater than 5 lbs, or twisting; log-roll technique; frequent repositioning as instructed) within 4 days. PHYSICAL THERAPY TREATMENT  Patient: Juliann Marie (28 y.o. female)  Date: 9/21/2017  Diagnosis: Intractable back pain  Leukocytosis  Hypokalemia  LUMBAR ABSCESS SIRS due to infectious process without acute organ dysfunction (HCC)  Procedure(s) (LRB):  SPINE LUMBAR LAMINECTOMY L3-L4 WITH EVACUATION OF ABSCESS  (N/A) 2 Days Post-Op  Precautions: Back      ASSESSMENT:  Patient progressing slowly toward goals. Has high pain levels and increased soreness today which limits her mobility progression. Supine to sit with supervision and extra time using log roll technique. Sit to stand with supervision and additional time secondary to pain. Amb approx 15 ft x 2 with RW and SBA. No LOB but limited secondary to pain/back spasms. Patient left up in chair for breakfast and reviewed need to have assistance to stand or return to bed after approx 30 mins. Recommend HH PT vs no services based on progress.   Progression toward goals:  [ ]      Improving appropriately and progressing toward goals  [X]      Improving slowly and progressing toward goals  [ ]      Not making progress toward goals and plan of care will be adjusted       PLAN:  Patient continues to benefit from skilled intervention to address the above impairments. Continue treatment per established plan of care. Discharge Recommendations:  Home Health and None  Further Equipment Recommendations for Discharge:  TBD-may need RW       SUBJECTIVE:   Patient stated \"This is really hard.    The patient stated 3/3 back precautions. Reviewed all 3 with patient. OBJECTIVE DATA SUMMARY:   Critical Behavior:  Neurologic State: Alert  Orientation Level: Oriented X4  Cognition: Appropriate decision making, Appropriate for age attention/concentration, Appropriate safety awareness, Follows commands  Safety/Judgement: Awareness of environment, Lack of insight into deficits  Functional Mobility Training:  Bed Mobility:  Log Rolling: Supervision  Supine to Sit: Supervision     Scooting: Supervision           Transfers:  Sit to Stand: Supervision  Stand to Sit: Supervision                             Balance:  Sitting: Intact  Standing: Intact; With support  Ambulation/Gait Training:  Distance (ft): 15 Feet (ft) (x 2)  Assistive Device: Gait belt;Walker, rolling  Ambulation - Level of Assistance: Contact guard assistance        Gait Abnormalities: Antalgic;Decreased step clearance;Shuffling gait              Speed/Zehra: Pace decreased (<100 feet/min); Shuffled; Slow  Step Length: Left shortened;Right shortened              Pain:  Pain Scale 1: Visual  Pain Intensity 1: 0  Pain Location 1: Back;Leg (radiates down right leg, same pain PTA)  Pain Orientation 1: Posterior;Right  Pain Description 1: Constant; Lutricia Wesley; Shooting;Aching  Pain Intervention(s) 1: Medication (see MAR) (unable to give percocet-over limit.morphine given)  Activity Tolerance:   VSS  Please refer to the flowsheet for vital signs taken during this treatment.   After treatment:   [X]  Patient left in no apparent distress sitting up in chair  [ ]  Patient left in no apparent distress in bed  [X]  Call bell left within reach  [X]  Nursing notified  [ ]  Caregiver present  [ ]  Bed alarm activated      COMMUNICATION/COLLABORATION:   The patients plan of care was discussed with: Physical Therapist, Occupational Therapist and Registered Nurse     Britta Gallagher, PT, DPT   Time Calculation: 16 mins

## 2017-09-22 LAB
DATE LAST DOSE: ABNORMAL
GLUCOSE BLD STRIP.AUTO-MCNC: 102 MG/DL (ref 65–100)
REPORTED DOSE,DOSE: ABNORMAL UNITS
REPORTED DOSE/TIME,TMG: ABNORMAL
SERVICE CMNT-IMP: ABNORMAL
VANCOMYCIN TROUGH SERPL-MCNC: 13.2 UG/ML (ref 5–10)

## 2017-09-22 PROCEDURE — 65270000032 HC RM SEMIPRIVATE

## 2017-09-22 PROCEDURE — 80202 ASSAY OF VANCOMYCIN: CPT | Performed by: INTERNAL MEDICINE

## 2017-09-22 PROCEDURE — B246ZZ4 ULTRASONOGRAPHY OF RIGHT AND LEFT HEART, TRANSESOPHAGEAL: ICD-10-PCS | Performed by: SPECIALIST

## 2017-09-22 PROCEDURE — 36415 COLL VENOUS BLD VENIPUNCTURE: CPT | Performed by: INTERNAL MEDICINE

## 2017-09-22 PROCEDURE — 87040 BLOOD CULTURE FOR BACTERIA: CPT | Performed by: INTERNAL MEDICINE

## 2017-09-22 PROCEDURE — 74011250637 HC RX REV CODE- 250/637: Performed by: INTERNAL MEDICINE

## 2017-09-22 PROCEDURE — 74011250636 HC RX REV CODE- 250/636: Performed by: SPECIALIST

## 2017-09-22 PROCEDURE — 74011250637 HC RX REV CODE- 250/637: Performed by: NURSE PRACTITIONER

## 2017-09-22 PROCEDURE — 74011250636 HC RX REV CODE- 250/636: Performed by: INTERNAL MEDICINE

## 2017-09-22 PROCEDURE — 74011250636 HC RX REV CODE- 250/636: Performed by: NURSE PRACTITIONER

## 2017-09-22 PROCEDURE — 99152 MOD SED SAME PHYS/QHP 5/>YRS: CPT

## 2017-09-22 PROCEDURE — 93325 DOPPLER ECHO COLOR FLOW MAPG: CPT

## 2017-09-22 PROCEDURE — 82962 GLUCOSE BLOOD TEST: CPT

## 2017-09-22 RX ORDER — FENTANYL CITRATE 50 UG/ML
25-200 INJECTION, SOLUTION INTRAMUSCULAR; INTRAVENOUS
Status: DISCONTINUED | OUTPATIENT
Start: 2017-09-22 | End: 2017-09-22

## 2017-09-22 RX ORDER — MIDAZOLAM HYDROCHLORIDE 1 MG/ML
.5-1 INJECTION, SOLUTION INTRAMUSCULAR; INTRAVENOUS
Status: DISCONTINUED | OUTPATIENT
Start: 2017-09-22 | End: 2017-09-22

## 2017-09-22 RX ORDER — ATROPINE SULFATE 0.1 MG/ML
1 INJECTION INTRAVENOUS AS NEEDED
Status: DISCONTINUED | OUTPATIENT
Start: 2017-09-22 | End: 2017-09-22

## 2017-09-22 RX ORDER — SODIUM CHLORIDE 0.9 % (FLUSH) 0.9 %
10 SYRINGE (ML) INJECTION AS NEEDED
Status: DISCONTINUED | OUTPATIENT
Start: 2017-09-22 | End: 2017-09-22 | Stop reason: HOSPADM

## 2017-09-22 RX ADMIN — GABAPENTIN 200 MG: 100 CAPSULE ORAL at 17:47

## 2017-09-22 RX ADMIN — OXYCODONE HYDROCHLORIDE AND ACETAMINOPHEN 2 TABLET: 5; 325 TABLET ORAL at 21:45

## 2017-09-22 RX ADMIN — FENTANYL CITRATE 50 MCG: 50 INJECTION, SOLUTION INTRAMUSCULAR; INTRAVENOUS at 12:16

## 2017-09-22 RX ADMIN — BUPROPION HYDROCHLORIDE 150 MG: 150 TABLET, EXTENDED RELEASE ORAL at 17:47

## 2017-09-22 RX ADMIN — CYCLOBENZAPRINE HYDROCHLORIDE 10 MG: 10 TABLET, FILM COATED ORAL at 17:50

## 2017-09-22 RX ADMIN — Medication 10 ML: at 21:46

## 2017-09-22 RX ADMIN — GABAPENTIN 200 MG: 100 CAPSULE ORAL at 09:36

## 2017-09-22 RX ADMIN — ESCITALOPRAM OXALATE 10 MG: 10 TABLET ORAL at 09:36

## 2017-09-22 RX ADMIN — TOPIRAMATE 50 MG: 25 TABLET, FILM COATED ORAL at 09:36

## 2017-09-22 RX ADMIN — VANCOMYCIN HYDROCHLORIDE 1500 MG: 10 INJECTION, POWDER, LYOPHILIZED, FOR SOLUTION INTRAVENOUS at 22:02

## 2017-09-22 RX ADMIN — FENTANYL CITRATE 25 MCG: 50 INJECTION, SOLUTION INTRAMUSCULAR; INTRAVENOUS at 12:29

## 2017-09-22 RX ADMIN — FENTANYL CITRATE 25 MCG: 50 INJECTION, SOLUTION INTRAMUSCULAR; INTRAVENOUS at 12:23

## 2017-09-22 RX ADMIN — VANCOMYCIN HYDROCHLORIDE 1500 MG: 10 INJECTION, POWDER, LYOPHILIZED, FOR SOLUTION INTRAVENOUS at 03:10

## 2017-09-22 RX ADMIN — OXYCODONE HYDROCHLORIDE AND ACETAMINOPHEN 2 TABLET: 5; 325 TABLET ORAL at 13:40

## 2017-09-22 RX ADMIN — BUPROPION HYDROCHLORIDE 150 MG: 150 TABLET, EXTENDED RELEASE ORAL at 09:36

## 2017-09-22 RX ADMIN — VANCOMYCIN HYDROCHLORIDE 1500 MG: 10 INJECTION, POWDER, LYOPHILIZED, FOR SOLUTION INTRAVENOUS at 13:19

## 2017-09-22 RX ADMIN — MIDAZOLAM HYDROCHLORIDE 1 MG: 1 INJECTION, SOLUTION INTRAMUSCULAR; INTRAVENOUS at 12:25

## 2017-09-22 RX ADMIN — MIDAZOLAM HYDROCHLORIDE 2 MG: 1 INJECTION, SOLUTION INTRAMUSCULAR; INTRAVENOUS at 12:22

## 2017-09-22 RX ADMIN — KETOROLAC TROMETHAMINE 30 MG: 30 INJECTION, SOLUTION INTRAMUSCULAR at 12:00

## 2017-09-22 RX ADMIN — TOPIRAMATE 50 MG: 25 TABLET, FILM COATED ORAL at 17:47

## 2017-09-22 RX ADMIN — OXYCODONE HYDROCHLORIDE AND ACETAMINOPHEN 2 TABLET: 5; 325 TABLET ORAL at 09:36

## 2017-09-22 RX ADMIN — MIDAZOLAM HYDROCHLORIDE 1 MG: 1 INJECTION, SOLUTION INTRAMUSCULAR; INTRAVENOUS at 12:29

## 2017-09-22 RX ADMIN — GABAPENTIN 200 MG: 100 CAPSULE ORAL at 21:45

## 2017-09-22 RX ADMIN — KETOROLAC TROMETHAMINE 30 MG: 30 INJECTION, SOLUTION INTRAMUSCULAR at 02:11

## 2017-09-22 RX ADMIN — OXYCODONE HYDROCHLORIDE AND ACETAMINOPHEN 2 TABLET: 5; 325 TABLET ORAL at 03:10

## 2017-09-22 RX ADMIN — Medication 1 CAPSULE: at 09:36

## 2017-09-22 RX ADMIN — Medication 10 ML: at 14:00

## 2017-09-22 RX ADMIN — OXYCODONE HYDROCHLORIDE AND ACETAMINOPHEN 2 TABLET: 5; 325 TABLET ORAL at 17:47

## 2017-09-22 RX ADMIN — DOCUSATE SODIUM AND SENNOSIDES 1 TABLET: 8.6; 5 TABLET, FILM COATED ORAL at 09:36

## 2017-09-22 RX ADMIN — MIDAZOLAM HYDROCHLORIDE 1 MG: 1 INJECTION, SOLUTION INTRAMUSCULAR; INTRAVENOUS at 12:23

## 2017-09-22 NOTE — PROGRESS NOTES
Cm continuing to follow for transitions of care. Patient will need IV ABX at discharge and cm will assist once medication regimen determined.   [employer] , [place]

## 2017-09-22 NOTE — PROGRESS NOTES
PT Note:    Chart reviewed and attempted to see for PT treatment. Patient currently JUNE for JOSE. Will continue to follow.

## 2017-09-22 NOTE — PROGRESS NOTES
Problem: Falls - Risk of  Goal: *Absence of Falls  Document Brandin Fall Risk and appropriate interventions in the flowsheet.    Outcome: Progressing Towards Goal  Fall Risk Interventions:  Mobility Interventions: Bed/chair exit alarm, Communicate number of staff needed for ambulation/transfer, Utilize walker, cane, or other assitive device           Medication Interventions: Bed/chair exit alarm, Patient to call before getting OOB, Teach patient to arise slowly     Elimination Interventions: Call light in reach, Patient to call for help with toileting needs

## 2017-09-22 NOTE — PROGRESS NOTES
TRANSFER - IN REPORT:    Verbal report received from [facility] on Vivian Coma  being received from cath lab procedure area  for routine progression of care. Report consisted of patients Situation, Background, Assessment and Recommendations(SBAR). Information from the following report(s) Kardex and Procedure Summary was reviewed with the receiving clinician. Opportunity for questions and clarification was provided. Assessment completed upon patients arrival to [address] [place] [address] and care assumed. Cardiac Cath Lab Recovery Arrival Note:    Vivian Coma arrived to [facility] recovery area. Patient procedure= JOSE. Patient on cardiac monitor, non-invasive blood pressure, SPO2 monitor. On  O2 @ 2 lpm via NC.  IV  of NS on pump at 25 ml/hr. Patient status doing well without problems. Patient is A&Ox 4. Patient reports no pain. PROCEDURE SITE CHECK:    Procedure site:  none    No change in patient status. Continue to monitor patient and status.

## 2017-09-22 NOTE — PROGRESS NOTES
Cardiac Cath Lab Procedure Area Arrival Note:    Antonia Walker arrived to Cardiac Cath Lab, Procedure Area. Patient identifiers verified with NAME and DATE OF BIRTH. Procedure verified with patient. Consent forms verified. Allergies verified. Patient informed of procedure and plan of care. Questions answered with review. Patient voiced understanding of procedure and plan of care. Patient on cardiac monitor, non-invasive blood pressure, SPO2 monitor. On room air or O2 @ 2 lpm via nasal cannula. IV of normal saline on pump at 25 ml/hr. Patient status doing well without problems. Patient is A&Ox 4. Patient reports back pain. Patient medicated during procedure with orders obtained and verified by Dr. Leatha Wilson.    Refer to patients Cardiac Cath Lab PROCEDURE REPORT for vital signs, assessment, status, and response during procedure, printed at end of case. Printed report on chart or scanned into chart.

## 2017-09-22 NOTE — PROGRESS NOTES
Hospitalist Progress Note  Office: [phone]  Cell: [phone]      Date of Service:  2017  NAME:  Jazmyn Murdock  :  1985  MRN:  [id number]      Admission Summary:   26 yo woman with lupus and obesity presented to the ED from home on 17 with back pain radiating to the right buttock and right leg. She was diagnosed with sciatica on 17. She was admitted inpatient medical floor for SIRS. Interval history / Subjective:   F/u back pain  Today is claiming that her pain is not under control  Sitting in a chair and having her breakfast     Assessment & Plan:     Lumbar epidural abscess  - CT abd/pelvis  nonobstructing L kidney stone otherwise no acute  - MRI lumbar  Extensive epidural abscess from the L2 level to the mid L5 in the posterior  right spinal canal, likely originating from inflammatory changes in the right L5-S1 facet joint. At least moderate thecal sac stenosis due to the collection. Soft tissue edema and small fluid collection abutting the posterior aspect of the right L5-S1 facet joint.   Probable small right psoas abscess  -MRI thoracic and cervical spine unremarkable  - BCx  suggestive of MRSA  -Repeat blood culture  MRSA  -Wound culture MRSA  - on vanc/Nafcillin started , now Nafcillin discontinued by ID  - s/p SPINE LUMBAR LAMINECTOMY L3-L4 WITH EVACUATION OF ABSCESS   -follow culture, continue Vanc  -Avoid PICC till blood culture negative  -follow ID/Neurosurgery    Sepsis (POA)  -sec to above  -Echo unremarkable  -JOSE done , awaiting formal report, but patient claims that she was told that it was fine    Back pain with lumbar radiculopathy and recent dx sciatica (POA)  - pain control with scheduled TID ibuprofen, lidocaine patch, gabapentin; prn Percocet  - [facility]  reviewed  and last had Percocet 5/325 #20 ordered on 17 by PCP  - dx sciatica recently at [facility], sent home with prednisone but no improvement  - PT/OT evals  -Prednisone discontinued    Acute blood loss anemia  -from surgery  -Monitor h/h for now, no need for transfusion for now    Lupus   - Plaquenil held till infection resolved  - Rheum is Dr Andrea Westbrook at [facility]    Obesity, Body mass index is 34.22 kg/(m^2). Hypokalemia (POA) - replete prn    Regular diet    PT/OT home health     Code status: full  DVT prophylaxis: SCDs  PTA: home    Plan: Follow cultures, ID, Neurosurgery. Care Plan discussed with: Patient/Family, Nurse and   Disposition: Home with family when stable     Hospital Problems  Date Reviewed: 9/18/2017          Codes Class Noted POA    Hypokalemia ICD-10-CM: E87.6  ICD-9-CM: 276.8  9/18/2017 Unknown        Intractable back pain ICD-10-CM: M54.9  ICD-9-CM: 724.5  9/18/2017 Unknown        * (Principal)SIRS due to infectious process without acute organ dysfunction ([facility] [address] ICD-10-CM: A41.9  ICD-9-CM: 038.9, 995.91  9/18/2017 Yes        Obesity (BMI 30-39.9) (Chronic) ICD-10-CM: E66.9  ICD-9-CM: 278.00  9/18/2017 Yes        Bacteriuria ICD-10-CM: R82.71  ICD-9-CM: 791.9  9/18/2017 Yes            Review of Systems:   Pertinent items are noted in HPI. Vital Signs:    Last 24hrs VS reviewed since prior progress note.  Most recent are:  Visit Vitals    /76    Pulse 78    Temp 98.3 °F (36.8 °C)    Resp 14    Ht 5' 6\" (1.676 m)    Wt 96.2 kg (212 lb)    SpO2 92%    BMI 34.22 kg/m2       Intake/Output Summary (Last 24 hours) at 09/22/17 1518  Last data filed at 09/22/17 6164   Gross per 24 hour   Intake                0 ml   Output             2900 ml   Net            -2900 ml        Physical Examination:     Constitutional:  awake, no acute distress, cooperative, pleasant, tearful, obese   ENT:  Oral mucosa moist, oropharynx benign  Neck supple, no masses   Resp:  CTA bilaterally, no wheezing/rhonchi/rales   CV:  regular rhythm, tachycardic, no m/r/g appreciated, no peripheral edema, +pulses    GI:  +BS, soft, non distended, non tender, obese     Musculoskeletal:  moves all extremities but reduced ROM b/l LEs; straight leg raise + on right    Neurologic:  AAOx3, NFD     Skin:  warm, dry  Eyes:  PERRL    Data Review:    Review and/or order of clinical lab test  Review and/or order of tests in the radiology section of [facility]  Review and/or order of tests in the medicine section of [facility]    Labs:     Recent Labs      09/21/17 0449 09/20/17 0457   WBC  16.8*  19.3*   HGB  10.4*  9.6*   HCT  31.8*  29.0*   PLT  322  294     Recent Labs      09/21/17 0449 09/20/17 0457   NA  139  142   K  3.5  3.7   CL  108  108   CO2  21  28   BUN  7  10   CREA  0.69  0.83   GLU  98  139*   CA  8.2*  8.4*     No results for input(s): SGOT, GPT, ALT, AP, TBIL, TBILI, TP, ALB, GLOB, GGT, AML, LPSE in the last 72 hours. No lab exists for component: AMYP, HLPSE  No results for input(s): INR, PTP, APTT in the last 72 hours. No lab exists for component: INREXT, INREXT   No results for input(s): FE, TIBC, PSAT, FERR in the last 72 hours. No results found for: FOL, RBCF   No results for input(s): PH, PCO2, PO2 in the last 72 hours. No results for input(s): CPK, CKNDX, TROIQ in the last 72 hours.     No lab exists for component: CPKMB  No results found for: CHOL, CHOLX, CHLST, CHOLV, HDL, LDL, LDLC, DLDLP, TGLX, TRIGL, TRIGP, CHHD, CHHDX  Lab Results   Component Value Date/Time    Glucose (POC) 102 09/22/2017 06:57 AM     Lab Results   Component Value Date/Time    Color YELLOW/STRAW 09/18/2017 03:32 AM    Appearance CLEAR 09/18/2017 03:32 AM    Specific gravity 1.030 09/18/2017 03:32 AM    Specific gravity 1.024 03/20/2015 02:41 PM    pH (UA) 6.0 09/18/2017 03:32 AM    Protein NEGATIVE  09/18/2017 03:32 AM    Glucose NEGATIVE  09/18/2017 03:32 AM    Ketone NEGATIVE  09/18/2017 03:32 AM    Bilirubin NEGATIVE  09/18/2017 03:32 AM    Urobilinogen 0.2 09/18/2017 03:32 AM    Nitrites NEGATIVE  09/18/2017 03:32 AM    Leukocyte Esterase NEGATIVE  09/18/2017 03:32 AM    Epithelial cells MANY 09/18/2017 03:32 AM    Bacteria 1+ 09/18/2017 03:32 AM    WBC 0-4 09/18/2017 03:32 AM    RBC 0-5 09/18/2017 03:32 AM     Medications Reviewed:     Current Facility-Administered Medications   Medication Dose Route Frequency    Vancomycin Trough 9/22 @ 2200   Other ONCE    saline peripheral flush soln 10 mL  10 mL InterCATHeter PRN    gabapentin (NEURONTIN) capsule 200 mg  200 mg Oral TID    oxyCODONE-acetaminophen (PERCOCET) 5-325 mg per tablet 1-2 Tab  1-2 Tab Oral Q4H PRN    lactobac ac& pc-s.therm-b.anim (ELLEN Q/RISAQUAD)  1 Cap Oral DAILY    vancomycin (VANCOCIN) 1500 mg in  ml infusion  1,500 mg IntraVENous Q8H    Vancomycin - Pharmacy to Dose   Other Rx Dosing/Monitoring    sodium chloride (NS) flush 5-10 mL  5-10 mL IntraVENous Q8H    sodium chloride (NS) flush 5-10 mL  5-10 mL IntraVENous PRN    acetaminophen (TYLENOL) tablet 650 mg  650 mg Oral Q4H PRN    ondansetron (ZOFRAN) injection 4 mg  4 mg IntraVENous Q4H PRN    phenol throat spray (CHLORASEPTIC) 1 Spray  1 Spray Oral PRN    diphenhydrAMINE (BENADRYL) capsule 25 mg  25 mg Oral Q4H PRN    cyclobenzaprine (FLEXERIL) tablet 10 mg  10 mg Oral TID PRN    sodium chloride (NS) flush 5-10 mL  5-10 mL IntraVENous Q8H    sodium chloride (NS) flush 5-10 mL  5-10 mL IntraVENous PRN    topiramate (TOPAMAX) tablet 50 mg  50 mg Oral BID    senna-docusate (PERICOLACE) 8.6-50 mg per tablet 1 Tab  1 Tab Oral DAILY    escitalopram oxalate (LEXAPRO) tablet 10 mg  10 mg Oral DAILY    buPROPion SR (WELLBUTRIN SR) tablet 150 mg  150 mg Oral BID    morphine injection 2 mg  2 mg IntraVENous Q4H PRN    butalbital-acetaminophen-caffeine (FIORICET, ESGIC) -40 mg per tablet 1 Tab  1 Tab Oral Q6H PRN     ______________________________________________________________________  EXPECTED LENGTH OF STAY: 3d 2h  ACTUAL LENGTH OF STAY:          4                 Jacoby Mays MD

## 2017-09-22 NOTE — PROGRESS NOTES
Cardiac Cath Lab Recovery Arrival Note:      Anuj Robles arrived to Cardiac Cath Lab, Recovery Area. Staff introduced to patient. Patient identifiers verified with NAME and DATE OF BIRTH. Procedure verified with patient. Consent forms reviewed and signed by patient or authorized representative and verified. Allergies verified. Patient and family oriented to department. Patient and family informed of procedure and plan of care. Questions answered with review. Patient prepped for procedure, per orders from physician, prior to arrival.    Patient on cardiac monitor, non-invasive blood pressure, SPO2 monitor. On RA. Patient is A&Ox 4. Patient reports no pain. Patient in stretcher, in low position, with side rails up, call bell within reach, patient instructed to call if assistance as needed. Patient prep in: [address], [place]. Patient family has pager #   Family in: . Prep by: MARTHA Krishnan RN

## 2017-09-22 NOTE — PROGRESS NOTES
Transfer to [facility] RR from Procedure Area    Verbal report given to Mariano Liriano on Mariposa Ray being transferred to Cardiac Cath Lab RR for routine progression of care   Patient is post JOSE procedure. Patient stable upon transfer to . Report consisted of patients Situation, Background, Assessment and   Recommendations(SBAR). Information from the following report(s) Kardex, Procedure Summary, Intake/Output, MAR and Recent Results was reviewed with the receiving nurse. Opportunity for questions and clarification was provided. Patient medicated during procedure with orders obtained and verified by Dr. Melina Norris.    Refer to patient PROCEDURE REPORT for vital signs, assessment, status, and response during procedure.

## 2017-09-22 NOTE — PROGRESS NOTES
PT Note:    Attempted to see in PM.  Patient remains off the floor for testing/procedures. Will continue to follow. Re-attempted to see patient in PM.  Remains JUNE. Will follow over the weekend.

## 2017-09-22 NOTE — PROGRESS NOTES
Occupational Therapy  Attempted to see for treatment however off the floor at JOSE. Will follow up as able. 2nd attempt and remains off the floor.   Sakina Angulo, OTR/L

## 2017-09-22 NOTE — PROGRESS NOTES
Neurosurgery Progress Note  [place], [place]  [phone]        Admit Date: 2017   LOS: 4 days        Daily Progress Note: 2017    POD: Postoperative day #3    S/P: Procedure(s):  SPINE LUMBAR LAMINECTOMY L3-L4 WITH EVACUATION OF ABSCESS     Subjective: The patient developed severe lower back pain and sciatic pain over a week ago. She denies accident or injury to her back. She went to an urgent care clinic and was given steroids/Toradol without relief. She developed a fever and chills with worsening pain, so she presented to the ER. She also had increasing cervical and thoracic pain. Imaging of her spine revealed a lumbar epidural abscess. She does have a history of lupus. She takes Plaquenil on a regular basis. She states she does not normally take prednisone. She was placed on this last week by urgent care for her sciatic pain. She is now postop day #3 from evacuation of lumbar epidural abscess. Pain improved today. Still with some moderate right hip pain which is shooting and aching in nature. Strength 5/5 in lower extremities. HVAC dc's. No drainage from incision or significant surrounding erythema or edema. MRSA isolated from intraoperative cultures. JOSE performed today and is negative. The MRI scans of her cervical spine and thoracic spine without evidence of epidural abscess. Denies  chest pain, nausea, vomiting, difficulty swallowing, headache, and dyspnea.        Objective:     Vital signs  Temp (24hrs), Av.7 °F (37.1 °C), Min:98.3 °F (36.8 °C), Max:99.1 °F (37.3 °C)       1901 -  0700  In: -   Out: 6485 [Urine:3700; Drains:65]    Visit Vitals    /76 (BP 1 Location: Left arm, BP Patient Position: At rest)    Pulse 78    Temp 98.3 °F (36.8 °C)    Resp 14    Ht 5' 6\" (1.676 m)    Wt 96.2 kg (212 lb)    SpO2 92%    BMI 34.22 kg/m2    O2 Flow Rate (L/min): 3 l/min O2 Device: Room air     Pain control  Pain Assessment  Pain Scale 1: Numeric (0 - 10)  Pain Intensity 1: 2  Pain Onset 1: postop  Pain Location 1: Back  Pain Orientation 1: Right  Pain Description 1: Aching  Pain Intervention(s) 1: Relaxation technique, Repositioned, Rest    PT/OT  Gait     Gait  Speed/Zehra: Pace decreased (<100 feet/min), Shuffled, Slow  Step Length: Left shortened, Right shortened  Gait Abnormalities: Antalgic, Decreased step clearance, Shuffling gait  Ambulation - Level of Assistance: Contact guard assistance  Distance (ft): 100 Feet (ft)  Assistive Device: Gait belt, Walker, rolling           Physical Exam:  Gen:NAD. Neuro: A&Ox3. Follows commands. Speech clear. Affect normal.  KISER. Strength 5/5 in UE and LE BL. Gait deferred. Skin: Lumbar dressing C/D/I. HVAC with serosanguineous drainage    MRI L-spine with and without contrast on 09/18/17 shows Extensive epidural abscess from the L2 level to the mid L5 in the posterior right spinal canal, likely originating from inflammatory changes in the right  L5-S1 facet joint. At least moderate thecal sac stenosis due to the collection. Soft tissue edema and small fluid collection abutting the posterior aspect of the right L5-S1 facet joint. Probable small right psoas abscess. 24 hour results:    Recent Results (from the past 24 hour(s))   GLUCOSE, POC    Collection Time: 09/22/17  6:57 AM   Result Value Ref Range    Glucose (POC) 102 (H) 65 - 100 mg/dL    Performed by Jaimee Bennett           Assessment:     Principal Problem:    SIRS due to infectious process without acute organ dysfunction ([address] (9/18/2017)    Active Problems:    Hypokalemia (9/18/2017)      Intractable back pain (9/18/2017)      Obesity (BMI 30-39.9) (9/18/2017)      Bacteriuria (9/18/2017)        Plan:   1. Lumbar epidural abscess   - s/p L3-4 laminectomy with evacuation of epidural abscess 09/19   - Pain control with Percocet PRN, Flexeril PRN. sched gabapentin   - HVAC out 9/21   - PT/OT evals   - ID following. Nafcillin stopped.  Cont Vancomycin   - Pt will need PICC placement once blood cultures remain negative   - Contact precautions for MRSA (Bactermia and isolated from intraoperative wound cx)   - Cont bowel regimen    2. MRSA bacteremia   - ID following   - TTE and JOSE negative   - Contact precautions   - awaiting PICC placement once blood cultures negative    3. SIRS ->resolved   - secondary to #1 and #2    - Hospitalist and ID following    4. Obesity   - BMI 34   - Counseling when appropraite    5. Lupus   - ID recommends discontinuing plaquenil for now. - Her rheumatologist is Ligia River NP with [facility]   - Prednisone which was only prescribed for her sciatica outpatient, was discontinued    6. Hypokalemia   - Potassium repleted   - Hospitalist following    7. Leukocytosis   - due to #1 and #2   - trending down    8. Anxiety   - Cont lexapro, wellbutrin    Activity: up with assist  DVT ppx: SCDs  Dispo: tbd    Plan d/w Dr. Krysta Tenorio, nurse.        Jesica Casanova NP

## 2017-09-22 NOTE — PROGRESS NOTES
Problem: Falls - Risk of  Goal: *Absence of Falls  Document Brandin Fall Risk and appropriate interventions in the flowsheet.    Outcome: Progressing Towards Goal  Fall Risk Interventions:  Mobility Interventions: Communicate number of staff needed for ambulation/transfer, Patient to call before getting OOB           Medication Interventions: Patient to call before getting OOB, Teach patient to arise slowly     Elimination Interventions: Call light in reach, Patient to call for help with toileting needs

## 2017-09-23 LAB
BACTERIA SPEC CULT: ABNORMAL
SERVICE CMNT-IMP: ABNORMAL

## 2017-09-23 PROCEDURE — 36415 COLL VENOUS BLD VENIPUNCTURE: CPT | Performed by: HOSPITALIST

## 2017-09-23 PROCEDURE — 85025 COMPLETE CBC W/AUTO DIFF WBC: CPT | Performed by: HOSPITALIST

## 2017-09-23 PROCEDURE — 65270000032 HC RM SEMIPRIVATE

## 2017-09-23 PROCEDURE — 74011250637 HC RX REV CODE- 250/637: Performed by: NURSE PRACTITIONER

## 2017-09-23 PROCEDURE — 74011250636 HC RX REV CODE- 250/636: Performed by: INTERNAL MEDICINE

## 2017-09-23 PROCEDURE — 74011250637 HC RX REV CODE- 250/637: Performed by: INTERNAL MEDICINE

## 2017-09-23 PROCEDURE — 97116 GAIT TRAINING THERAPY: CPT

## 2017-09-23 PROCEDURE — 80053 COMPREHEN METABOLIC PANEL: CPT | Performed by: INTERNAL MEDICINE

## 2017-09-23 RX ADMIN — OXYCODONE HYDROCHLORIDE AND ACETAMINOPHEN 2 TABLET: 5; 325 TABLET ORAL at 21:54

## 2017-09-23 RX ADMIN — GABAPENTIN 200 MG: 100 CAPSULE ORAL at 15:04

## 2017-09-23 RX ADMIN — CYCLOBENZAPRINE HYDROCHLORIDE 10 MG: 10 TABLET, FILM COATED ORAL at 17:23

## 2017-09-23 RX ADMIN — OXYCODONE HYDROCHLORIDE AND ACETAMINOPHEN 2 TABLET: 5; 325 TABLET ORAL at 17:23

## 2017-09-23 RX ADMIN — Medication 1 CAPSULE: at 09:15

## 2017-09-23 RX ADMIN — Medication 10 ML: at 22:52

## 2017-09-23 RX ADMIN — OXYCODONE HYDROCHLORIDE AND ACETAMINOPHEN 2 TABLET: 5; 325 TABLET ORAL at 09:15

## 2017-09-23 RX ADMIN — DOCUSATE SODIUM AND SENNOSIDES 1 TABLET: 8.6; 5 TABLET, FILM COATED ORAL at 09:15

## 2017-09-23 RX ADMIN — Medication 10 ML: at 03:27

## 2017-09-23 RX ADMIN — VANCOMYCIN HYDROCHLORIDE 1500 MG: 10 INJECTION, POWDER, LYOPHILIZED, FOR SOLUTION INTRAVENOUS at 22:51

## 2017-09-23 RX ADMIN — Medication 10 ML: at 14:00

## 2017-09-23 RX ADMIN — ESCITALOPRAM OXALATE 10 MG: 10 TABLET ORAL at 09:15

## 2017-09-23 RX ADMIN — Medication 10 ML: at 06:00

## 2017-09-23 RX ADMIN — OXYCODONE HYDROCHLORIDE AND ACETAMINOPHEN 2 TABLET: 5; 325 TABLET ORAL at 03:25

## 2017-09-23 RX ADMIN — CYCLOBENZAPRINE HYDROCHLORIDE 10 MG: 10 TABLET, FILM COATED ORAL at 09:15

## 2017-09-23 RX ADMIN — TOPIRAMATE 50 MG: 25 TABLET, FILM COATED ORAL at 09:15

## 2017-09-23 RX ADMIN — VANCOMYCIN HYDROCHLORIDE 1500 MG: 10 INJECTION, POWDER, LYOPHILIZED, FOR SOLUTION INTRAVENOUS at 15:04

## 2017-09-23 RX ADMIN — VANCOMYCIN HYDROCHLORIDE 1500 MG: 10 INJECTION, POWDER, LYOPHILIZED, FOR SOLUTION INTRAVENOUS at 07:03

## 2017-09-23 RX ADMIN — BUPROPION HYDROCHLORIDE 150 MG: 150 TABLET, EXTENDED RELEASE ORAL at 17:23

## 2017-09-23 RX ADMIN — TOPIRAMATE 50 MG: 25 TABLET, FILM COATED ORAL at 17:23

## 2017-09-23 RX ADMIN — BUPROPION HYDROCHLORIDE 150 MG: 150 TABLET, EXTENDED RELEASE ORAL at 09:15

## 2017-09-23 RX ADMIN — OXYCODONE HYDROCHLORIDE AND ACETAMINOPHEN 2 TABLET: 5; 325 TABLET ORAL at 13:19

## 2017-09-23 RX ADMIN — GABAPENTIN 200 MG: 100 CAPSULE ORAL at 21:54

## 2017-09-23 RX ADMIN — Medication 10 ML: at 01:00

## 2017-09-23 RX ADMIN — GABAPENTIN 200 MG: 100 CAPSULE ORAL at 09:15

## 2017-09-23 NOTE — PROGRESS NOTES
Unable to obtain blood sample for a.m labs after multiple attempts. Pt does not want any more attempts from staff at this time. Pt states she is tired of being stuck repeatedly and after explaining the necessity of collecting the ordered labs, pt insists on not being stuck at this time and would like to wait until later this a.m to speak to MD regarding receiving a PICC line. Pt aware that blood cultures results are pending and that once results negative, then PICC is to be placed.

## 2017-09-23 NOTE — PROGRESS NOTES
Problem: Mobility Impaired (Adult and Pediatric)  Goal: *Acute Goals and Plan of Care (Insert Text)  Physical Therapy Goals  Initiated 9/20/2017    1. Patient will move from supine to sit and sit to supine and roll side to side in bed with independence within 4 days. 2. Patient will perform sit to stand with independence within 4 days. 3. Patient will ambulate with independence for 150 feet with the least restrictive device within 4 days. 4. Patient will ascend/descend 13 stairs with 1 handrail(s) with modified independence within 4 days. 5. Patient will verbalize and demonstrate understanding of spinal precautions (No bending, lifting greater than 5 lbs, or twisting; log-roll technique; frequent repositioning as instructed) within 4 days. PHYSICAL THERAPY TREATMENT  Patient: Tony Velasquez (60 y.o. female)  Date: 9/23/2017  Diagnosis: Intractable back pain  Leukocytosis  Hypokalemia  LUMBAR ABSCESS SIRS due to infectious process without acute organ dysfunction (HCC)  Procedure(s) (LRB):  SPINE LUMBAR LAMINECTOMY L3-L4 WITH EVACUATION OF ABSCESS  (N/A) 4 Days Post-Op  Precautions: Back  Chart, physical therapy assessment, plan of care and goals were reviewed. ASSESSMENT:  Pt is mobilizing at a supervision level with rolling walker. Pt requires increase time for transfers and gait. Pt increased gait tolerance with no LOB. Pt was able to utilize the bathroom at a Mod I level. If patient continue to use a rolling walker for gait, then will need to order prior to discharge. Progression toward goals:  [X]      Improving appropriately and progressing toward goals  [ ]      Improving slowly and progressing toward goals  [ ]      Not making progress toward goals and plan of care will be adjusted       PLAN:  Patient continues to benefit from skilled intervention to address the above impairments. Continue treatment per established plan of care.   Discharge Recommendations:  None  Further Equipment Recommendations for Discharge:  Rolling walker       SUBJECTIVE:   Patient stated I am hurting now.  post gait      OBJECTIVE DATA SUMMARY:   Critical Behavior:  Neurologic State: Alert  Orientation Level: Oriented X4  Cognition: Appropriate decision making  Safety/Judgement: Awareness of environment, Good awareness of safety precautions (3/3 precautions demonstrated)  Functional Mobility Training:  Bed Mobility:  Rolling: Supervision  Supine to Sit: Supervision                          Transfers:  Sit to Stand: Supervision  Stand to Sit: Supervision                             Balance:  Sitting: Intact  Standing: Intact; With support  Ambulation/Gait Training:  Distance (ft): 140 Feet (ft)  Assistive Device: Gait belt;Walker, rolling  Ambulation - Level of Assistance: Supervision        Gait Abnormalities: Antalgic;Decreased step clearance              Speed/Zerha: Pace decreased (<100 feet/min); Slow  Step Length: Left shortened;Right shortened                    Stairs:              Neuro Re-Education:     Therapeutic Exercises:      Pain:  Pain Scale 1: Numeric (0 - 10)  Pain Intensity 1: 5  Pain Location 1: Back  Pain Orientation 1: Right  Pain Description 1: Aching  Pain Intervention(s) 1: Medication (see MAR)  Activity Tolerance:   Limited   Please refer to the flowsheet for vital signs taken during this treatment.   After treatment:   [X] Patient left in no apparent distress sitting up in chair  [ ] Patient left in no apparent distress in bed  [X] Call bell left within reach  [X] Nursing notified  [ ] Caregiver present  [ ] Bed alarm activated      COMMUNICATION/COLLABORATION:   The patients plan of care was discussed with: Registered Nurse     Prakash Day PTA   Time Calculation: 16 mins

## 2017-09-23 NOTE — PROGRESS NOTES
Problem: Falls - Risk of  Goal: *Absence of Falls  Document Brandin Fall Risk and appropriate interventions in the flowsheet.    Outcome: Progressing Towards Goal  Fall Risk Interventions:  Mobility Interventions: Communicate number of staff needed for ambulation/transfer, Patient to call before getting OOB, Utilize walker, cane, or other assitive device           Medication Interventions: Patient to call before getting OOB, Teach patient to arise slowly     Elimination Interventions: Call light in reach, Patient to call for help with toileting needs

## 2017-09-23 NOTE — PROGRESS NOTES
Pharmacist Note - Vancomycin Dosing  Therapy day 4  Indication: Bacteremia/Possible CNS infection  Current regimen: 1500 mg q8h    A Trough Level resulted at 13.2 mcg/mL which was obtained 8.5 hrs post-dose. The extrapolated \"true\" trough is approximately 14.11 mcg/mL based on the patient's known kinetics. Goal trough: 15 - 20 mcg/mL     Plan: Continue current regimen. Pharmacy will continue to monitor this patient daily for changes in clinical status and renal function.

## 2017-09-23 NOTE — PROGRESS NOTES
Pharmacist Note - Vancomycin Dosing  Therapy day 5  Indication: MRSA bacteremia and epidural abscess  Current regimen: 1500 mg q8h    Recent Labs      09/21/17   0449   WBC  16.8*   CREA  0.69   BUN  7     A Trough Level resulted at 13.2 mcg/mL which was obtained 8.5 hrs post-dose. The extrapolated \"true\" trough is approximately 14.11 mcg/mL based on the patient's known kinetics. Goal trough: 15 - 20 mcg/mL     Plan: Continue current regimen. Patient is on high dose therapy and is very close to goal.  Additionally, no ScrBUN has been reported since 9/21. Will reorder for tomorrow. Pharmacy to monitor patient daily for dosage adjustments as needed.

## 2017-09-23 NOTE — PROGRESS NOTES
Broadus Ganser is a 27 y. o. with a history of SLE followed at [facility] on [facility] presents with back pain for more than a week. Pain radiating down rt buttock X 1 week, Fever X 2 days  Pt says she developed severe low back pain more on the right side 10 days ago and was taking diclofenac. The pain was radiating down her rt buttock. she had gone to [facility] on 9/9/ and was given prednisone 20mg  and sent home. She went to her PCP who asked her to increase the steroids. She returned to ED on 9/18 as she was not getting better. She also started having fever and chills. In the ED she had spinal xray , wbc was > 16 and had fever. She later had MRI which revealed Extensive epidural abscess from the L2 level to the mid L5 in the posterior right spinal canal, likely originating from inflammatory changes in the right L5-S1 facet joint. She was seen by neurosurgeon who asked for MRI of the thoracic and cervical spine because eof pain. Pt was in [facility] in July 2017 with back pain after her 11 yr old son jumped on her back. She had Xray lumbar spine which was normal. She was asked to take analgesics. 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 She says she had a face cyst 3 weeks ago and it was squeezed and purulent discharge was obtained and her PCP gave her bactrim with near resolution. Subjective  Patient has back pain but stays on top of it by taking percocet proactively  Objective:   VITALS:    Patient Vitals for the past 12 hrs:   Temp Pulse Resp BP SpO2   09/23/17 1405 99.3 °F (37.4 °C) 100 17 113/76 96 %   09/23/17 0914 98.6 °F (37 °C) 74 16 129/84 99 %        PHYSICAL EXAM:   General:                    Alert   Lungs:                       B/l air entry. Heart:                                  s1s2  Abdomen:                  Soft,   Extremities:               Extremities normal, atraumatic, no cyanosis. No edema. No clubbing  Skin:                                    No rashes or lesions.   Not Jaundiced  Lymph:                      Cervical, supraclavicular normal.  Neurologic:                Grossly non-focal     Pertinent Labs   Wbc 19.3>16.8  Hb 10.4    Cr 0.83>.0.69  Bc MRSA- vanco MIC1  Wound culture MRSA        IMAGING RESULTS:       Impression/Recommendation     27 yr female with lupus on plaquenil presents with 1 week h/o rt sided low back pain with radiation      L5-S1 facet joint infection and  epidural abscess extending between l2-l5 due MRSA  S/p laminectomy l3-L4  and drainage of epidural abscess    MRSA bacteremia- on vanco( DEMETRI 1)  9/21 and 9/22 BC neg so far  If they remain neg PICC can be placed on monday  2d echo- no obvious veg   JOSE no vegetation     Lupus- on plaquenil- hold until clearance of bacteremia and may be can started after 2 weeks     Discussed the management with the patient

## 2017-09-23 NOTE — PROGRESS NOTES
Hospitalist Progress Note  Office: [phone]  Cell: [phone]      Date of Service:  2017  NAME:  Roselyn Concepcion  :  1985  MRN:  [id number]      Admission Summary:   26 yo woman with lupus and obesity presented to the ED from home on 17 with back pain radiating to the right buttock and right leg. She was diagnosed with sciatica on 17. She was admitted inpatient medical floor for SIRS. Interval history / Subjective:   F/u back pain  RNs having difficulty in finding vascular access     Assessment & Plan:     Lumbar epidural abscess  - CT abd/pelvis  nonobstructing L kidney stone otherwise no acute  - MRI lumbar  Extensive epidural abscess from the L2 level to the mid L5 in the posterior  right spinal canal, likely originating from inflammatory changes in the right L5-S1 facet joint. At least moderate thecal sac stenosis due to the collection. Soft tissue edema and small fluid collection abutting the posterior aspect of the right L5-S1 facet joint.   Probable small right psoas abscess  -MRI thoracic and cervical spine unremarkable  - BCx  suggestive of MRSA  -Repeat blood culture  MRSA  -Wound culture MRSA  - on vanc/Nafcillin started , now Nafcillin discontinued by ID  - s/p SPINE LUMBAR LAMINECTOMY L3-L4 WITH EVACUATION OF ABSCESS   -follow culture, continue Vanc  -Avoid PICC till 2 blood cultures negative  -follow ID/Neurosurgery    Sepsis (POA)  -sec to above  -Echo unremarkable  -JOSE done , awaiting formal report, but patient claims that she was told that it was fine    Back pain with lumbar radiculopathy and recent dx sciatica (POA)  - pain control with scheduled TID ibuprofen, lidocaine patch, gabapentin; prn Percocet  - [facility]  [facility]  and last had Percocet 5/325 #20 ordered on 17 by PCP  - dx sciatica recently at [facility], sent home with prednisone but no improvement  - PT/OT evals  -Prednisone discontinued    Acute blood loss anemia  -from surgery  -Monitor h/h for now, no need for transfusion for now    Lupus   - Plaquenil held till infection resolved  - Rheum is Dr Dewey Cerna at [facility]    Obesity, Body mass index is 34.22 kg/(m^2). Hypokalemia (POA) - replete prn    Regular diet    PT/OT home health     Code status: full  DVT prophylaxis: SCDs  PTA: home    Plan: Follow cultures, ID, Neurosurgery. PICC? monday    Care Plan discussed with: Patient/Family, Nurse and   Disposition: Home with family when stable     Hospital Problems  Date Reviewed: 9/18/2017          Codes Class Noted POA    Hypokalemia ICD-10-CM: E87.6  ICD-9-CM: 276.8  9/18/2017 Unknown        Intractable back pain ICD-10-CM: M54.9  ICD-9-CM: 724.5  9/18/2017 Unknown        * (Principal)SIRS due to infectious process without acute organ dysfunction ([facility] [address] ICD-10-CM: A41.9  ICD-9-CM: 038.9, 995.91  9/18/2017 Yes        Obesity (BMI 30-39.9) (Chronic) ICD-10-CM: E66.9  ICD-9-CM: 278.00  9/18/2017 Yes        Bacteriuria ICD-10-CM: R82.71  ICD-9-CM: 791.9  9/18/2017 Yes            Review of Systems:   Pertinent items are noted in HPI. Vital Signs:    Last 24hrs VS reviewed since prior progress note.  Most recent are:  Visit Vitals    /84 (BP 1 Location: Right arm, BP Patient Position: At rest)    Pulse 74    Temp 98.6 °F (37 °C)    Resp 16    Ht 5' 6\" (1.676 m)    Wt 96.2 kg (212 lb)    SpO2 99%    BMI 34.22 kg/m2     No intake or output data in the 24 hours ending 09/23/17 1357     Physical Examination:     Constitutional:  awake, no acute distress, cooperative, pleasant, tearful, obese   ENT:  Oral mucosa moist, oropharynx benign  Neck supple, no masses   Resp:  CTA bilaterally, no wheezing/rhonchi/rales   CV:  regular rhythm, tachycardic, no m/r/g appreciated, no peripheral edema, +pulses    GI:  +BS, soft, non distended, non tender, obese     Musculoskeletal:  moves all extremities but reduced ROM b/l LEs; straight leg raise + on right    Neurologic:  AAOx3, NFD     Skin:  warm, dry  Eyes:  PERRL    Data Review:    Review and/or order of clinical lab test  Review and/or order of tests in the radiology section of CPT  Review and/or order of tests in the medicine section of CPT    Labs:     Recent Labs      09/21/17   0449   WBC  16.8*   HGB  10.4*   HCT  31.8*   PLT  322     Recent Labs      09/21/17   0449   NA  139   K  3.5   CL  108   CO2  21   BUN  7   CREA  0.69   GLU  98   CA  8.2*     No results for input(s): SGOT, GPT, ALT, AP, TBIL, TBILI, TP, ALB, GLOB, GGT, AML, LPSE in the last 72 hours. No lab exists for component: AMYP, HLPSE  No results for input(s): INR, PTP, APTT in the last 72 hours. No lab exists for component: INREXT, INREXT   No results for input(s): FE, TIBC, PSAT, FERR in the last 72 hours. No results found for: FOL, RBCF   No results for input(s): PH, PCO2, PO2 in the last 72 hours. No results for input(s): CPK, CKNDX, TROIQ in the last 72 hours.     No lab exists for component: CPKMB  No results found for: CHOL, CHOLX, CHLST, CHOLV, HDL, LDL, LDLC, DLDLP, TGLX, TRIGL, TRIGP, CHHD, CHHDX  Lab Results   Component Value Date/Time    Glucose (POC) 102 09/22/2017 06:57 AM     Lab Results   Component Value Date/Time    Color YELLOW/STRAW 09/18/2017 03:32 AM    Appearance CLEAR 09/18/2017 03:32 AM    Specific gravity 1.030 09/18/2017 03:32 AM    Specific gravity 1.024 03/20/2015 02:41 PM    pH (UA) 6.0 09/18/2017 03:32 AM    Protein NEGATIVE  09/18/2017 03:32 AM    Glucose NEGATIVE  09/18/2017 03:32 AM    Ketone NEGATIVE  09/18/2017 03:32 AM    Bilirubin NEGATIVE  09/18/2017 03:32 AM    Urobilinogen 0.2 09/18/2017 03:32 AM    Nitrites NEGATIVE  09/18/2017 03:32 AM    Leukocyte Esterase NEGATIVE  09/18/2017 03:32 AM    Epithelial cells MANY 09/18/2017 03:32 AM    Bacteria 1+ 09/18/2017 03:32 AM    WBC 0-4 09/18/2017 03:32 AM    RBC 0-5 09/18/2017 03:32 AM Medications Reviewed:     Current Facility-Administered Medications   Medication Dose Route Frequency    gabapentin (NEURONTIN) capsule 200 mg  200 mg Oral TID    oxyCODONE-acetaminophen (PERCOCET) 5-325 mg per tablet 1-2 Tab  1-2 Tab Oral Q4H PRN    lactobac ac& pc-s.therm-b.anim (ELLEN Q/RISAQUAD)  1 Cap Oral DAILY    vancomycin (VANCOCIN) 1500 mg in  ml infusion  1,500 mg IntraVENous Q8H    Vancomycin - Pharmacy to Dose   Other Rx Dosing/Monitoring    sodium chloride (NS) flush 5-10 mL  5-10 mL IntraVENous Q8H    sodium chloride (NS) flush 5-10 mL  5-10 mL IntraVENous PRN    acetaminophen (TYLENOL) tablet 650 mg  650 mg Oral Q4H PRN    ondansetron (ZOFRAN) injection 4 mg  4 mg IntraVENous Q4H PRN    phenol throat spray (CHLORASEPTIC) 1 Spray  1 Spray Oral PRN    diphenhydrAMINE (BENADRYL) capsule 25 mg  25 mg Oral Q4H PRN    cyclobenzaprine (FLEXERIL) tablet 10 mg  10 mg Oral TID PRN    sodium chloride (NS) flush 5-10 mL  5-10 mL IntraVENous Q8H    sodium chloride (NS) flush 5-10 mL  5-10 mL IntraVENous PRN    topiramate (TOPAMAX) tablet 50 mg  50 mg Oral BID    senna-docusate (PERICOLACE) 8.6-50 mg per tablet 1 Tab  1 Tab Oral DAILY    escitalopram oxalate (LEXAPRO) tablet 10 mg  10 mg Oral DAILY    buPROPion SR (WELLBUTRIN SR) tablet 150 mg  150 mg Oral BID    morphine injection 2 mg  2 mg IntraVENous Q4H PRN    butalbital-acetaminophen-caffeine (FIORICET, ESGIC) -40 mg per tablet 1 Tab  1 Tab Oral Q6H PRN     ______________________________________________________________________  EXPECTED LENGTH OF STAY: 3d 2h  ACTUAL LENGTH OF STAY:          Edith Noriega MD

## 2017-09-24 LAB
ALBUMIN SERPL-MCNC: 2.2 G/DL (ref 3.5–5)
ALBUMIN/GLOB SERPL: 0.5 {RATIO} (ref 1.1–2.2)
ALP SERPL-CCNC: 82 U/L (ref 45–117)
ALT SERPL-CCNC: 48 U/L (ref 12–78)
ANION GAP SERPL CALC-SCNC: 10 MMOL/L (ref 5–15)
AST SERPL-CCNC: 23 U/L (ref 15–37)
BACTERIA SPEC CULT: ABNORMAL
BASOPHILS # BLD: 0 K/UL (ref 0–0.1)
BASOPHILS NFR BLD: 0 % (ref 0–1)
BILIRUB SERPL-MCNC: 0.2 MG/DL (ref 0.2–1)
BUN SERPL-MCNC: 13 MG/DL (ref 6–20)
BUN/CREAT SERPL: 18 (ref 12–20)
CALCIUM SERPL-MCNC: 8.4 MG/DL (ref 8.5–10.1)
CHLORIDE SERPL-SCNC: 109 MMOL/L (ref 97–108)
CO2 SERPL-SCNC: 21 MMOL/L (ref 21–32)
CREAT SERPL-MCNC: 0.74 MG/DL (ref 0.55–1.02)
DIFFERENTIAL METHOD BLD: ABNORMAL
EOSINOPHIL # BLD: 0 K/UL (ref 0–0.4)
EOSINOPHIL NFR BLD: 0 % (ref 0–7)
ERYTHROCYTE [DISTWIDTH] IN BLOOD BY AUTOMATED COUNT: 13 % (ref 11.5–14.5)
GLOBULIN SER CALC-MCNC: 4.2 G/DL (ref 2–4)
GLUCOSE SERPL-MCNC: 103 MG/DL (ref 65–100)
HCT VFR BLD AUTO: 32.4 % (ref 35–47)
HGB BLD-MCNC: 10.6 G/DL (ref 11.5–16)
LYMPHOCYTES # BLD: 1.6 K/UL (ref 0.8–3.5)
LYMPHOCYTES NFR BLD: 17 % (ref 12–49)
MCH RBC QN AUTO: 31.1 PG (ref 26–34)
MCHC RBC AUTO-ENTMCNC: 32.7 G/DL (ref 30–36.5)
MCV RBC AUTO: 95 FL (ref 80–99)
MONOCYTES # BLD: 0.3 K/UL (ref 0–1)
MONOCYTES NFR BLD: 3 % (ref 5–13)
MYELOCYTES NFR BLD MANUAL: 3 %
NEUTS BAND NFR BLD MANUAL: 1 % (ref 0–6)
NEUTS SEG # BLD: 7.2 K/UL (ref 1.8–8)
NEUTS SEG NFR BLD: 76 % (ref 32–75)
PLATELET # BLD AUTO: 364 K/UL (ref 150–400)
PLATELET COMMENTS,PCOM: ABNORMAL
POTASSIUM SERPL-SCNC: 3.7 MMOL/L (ref 3.5–5.1)
PROT SERPL-MCNC: 6.4 G/DL (ref 6.4–8.2)
RBC # BLD AUTO: 3.41 M/UL (ref 3.8–5.2)
RBC MORPH BLD: ABNORMAL
SERVICE CMNT-IMP: ABNORMAL
SODIUM SERPL-SCNC: 140 MMOL/L (ref 136–145)
WBC # BLD AUTO: 9.4 K/UL (ref 3.6–11)
WBC MORPH BLD: ABNORMAL

## 2017-09-24 PROCEDURE — 74011250637 HC RX REV CODE- 250/637: Performed by: NURSE PRACTITIONER

## 2017-09-24 PROCEDURE — 97116 GAIT TRAINING THERAPY: CPT

## 2017-09-24 PROCEDURE — 87040 BLOOD CULTURE FOR BACTERIA: CPT | Performed by: INTERNAL MEDICINE

## 2017-09-24 PROCEDURE — 74011250637 HC RX REV CODE- 250/637: Performed by: INTERNAL MEDICINE

## 2017-09-24 PROCEDURE — 74011250636 HC RX REV CODE- 250/636: Performed by: INTERNAL MEDICINE

## 2017-09-24 PROCEDURE — 97535 SELF CARE MNGMENT TRAINING: CPT

## 2017-09-24 PROCEDURE — 65270000032 HC RM SEMIPRIVATE

## 2017-09-24 RX ADMIN — CYCLOBENZAPRINE HYDROCHLORIDE 10 MG: 10 TABLET, FILM COATED ORAL at 01:55

## 2017-09-24 RX ADMIN — Medication 10 ML: at 22:42

## 2017-09-24 RX ADMIN — OXYCODONE HYDROCHLORIDE AND ACETAMINOPHEN 2 TABLET: 5; 325 TABLET ORAL at 10:12

## 2017-09-24 RX ADMIN — VANCOMYCIN HYDROCHLORIDE 1500 MG: 10 INJECTION, POWDER, LYOPHILIZED, FOR SOLUTION INTRAVENOUS at 22:40

## 2017-09-24 RX ADMIN — CYCLOBENZAPRINE HYDROCHLORIDE 10 MG: 10 TABLET, FILM COATED ORAL at 08:31

## 2017-09-24 RX ADMIN — Medication 10 ML: at 14:21

## 2017-09-24 RX ADMIN — ESCITALOPRAM OXALATE 10 MG: 10 TABLET ORAL at 08:28

## 2017-09-24 RX ADMIN — TOPIRAMATE 50 MG: 25 TABLET, FILM COATED ORAL at 17:23

## 2017-09-24 RX ADMIN — OXYCODONE HYDROCHLORIDE AND ACETAMINOPHEN 2 TABLET: 5; 325 TABLET ORAL at 22:40

## 2017-09-24 RX ADMIN — GABAPENTIN 200 MG: 100 CAPSULE ORAL at 22:40

## 2017-09-24 RX ADMIN — GABAPENTIN 200 MG: 100 CAPSULE ORAL at 17:22

## 2017-09-24 RX ADMIN — VANCOMYCIN HYDROCHLORIDE 1500 MG: 10 INJECTION, POWDER, LYOPHILIZED, FOR SOLUTION INTRAVENOUS at 14:20

## 2017-09-24 RX ADMIN — TOPIRAMATE 50 MG: 25 TABLET, FILM COATED ORAL at 08:28

## 2017-09-24 RX ADMIN — OXYCODONE HYDROCHLORIDE AND ACETAMINOPHEN 1 TABLET: 5; 325 TABLET ORAL at 01:55

## 2017-09-24 RX ADMIN — BUPROPION HYDROCHLORIDE 150 MG: 150 TABLET, EXTENDED RELEASE ORAL at 18:00

## 2017-09-24 RX ADMIN — DOCUSATE SODIUM AND SENNOSIDES 1 TABLET: 8.6; 5 TABLET, FILM COATED ORAL at 08:28

## 2017-09-24 RX ADMIN — CYCLOBENZAPRINE HYDROCHLORIDE 10 MG: 10 TABLET, FILM COATED ORAL at 17:22

## 2017-09-24 RX ADMIN — BUPROPION HYDROCHLORIDE 150 MG: 150 TABLET, EXTENDED RELEASE ORAL at 08:28

## 2017-09-24 RX ADMIN — GABAPENTIN 200 MG: 100 CAPSULE ORAL at 08:28

## 2017-09-24 RX ADMIN — OXYCODONE HYDROCHLORIDE AND ACETAMINOPHEN 2 TABLET: 5; 325 TABLET ORAL at 14:20

## 2017-09-24 RX ADMIN — VANCOMYCIN HYDROCHLORIDE 1500 MG: 10 INJECTION, POWDER, LYOPHILIZED, FOR SOLUTION INTRAVENOUS at 07:04

## 2017-09-24 RX ADMIN — Medication 10 ML: at 06:24

## 2017-09-24 RX ADMIN — Medication 1 CAPSULE: at 08:28

## 2017-09-24 RX ADMIN — OXYCODONE HYDROCHLORIDE AND ACETAMINOPHEN 2 TABLET: 5; 325 TABLET ORAL at 06:24

## 2017-09-24 RX ADMIN — OXYCODONE HYDROCHLORIDE AND ACETAMINOPHEN 2 TABLET: 5; 325 TABLET ORAL at 18:21

## 2017-09-24 RX ADMIN — Medication 10 ML: at 14:20

## 2017-09-24 NOTE — PROGRESS NOTES
Problem: Self Care Deficits Care Plan (Adult)  Goal: *Acute Goals and Plan of Care (Insert Text)  Occupational Therapy Goals  Initiated 9/20/2017  1. Patient will perform lower body dressing with modified independence using AE PRN within 7 days. 2. Patient will perform toileting with modified independence using most appropriate DME within 7 days. 3. Patient will perform standing 5 mins at modified independence within 7 days. 4. Patient will perform gathering ADL items high and low without cues safety, technique or tool use 4/4 at modified independence within 7 days. 5. Patient will verbalize/demonstrate 3/3 back precautions during ADL tasks without cues within 7 days. OCCUPATIONAL THERAPY TREATMENT  Patient: Veronica Kirby (89 y.o. female)  Date: 9/24/2017  Diagnosis: Intractable back pain  Leukocytosis  Hypokalemia  LUMBAR ABSCESS SIRS due to infectious process without acute organ dysfunction (HCC)  Procedure(s) (LRB):  SPINE LUMBAR LAMINECTOMY L3-L4 WITH EVACUATION OF ABSCESS  (N/A) 5 Days Post-Op  Precautions: Back      ASSESSMENT:  Patient received in bathroom completing toilet with RW and RN present. Patient able to complete bladder hygiene but reporting she is having difficulty with bowel hygiene. Patient stating that she is coordinating installation of a bidet at home; provided education on toilet tongs as well. Patient able to complete clothing management. Returned to bed with supervision. Provided education on lower body dressing techniques, plans to purchase reacher. Patient stating she has old shower with curved floor and slippery surfaces; provided education on use of tub transfer bench to maximize independence and safety, patient stating she will  one. Provided education on car transfer techniques, verbalizing understanding. Patient left supine in bed with call bell in reach, no needs. Will follow up 1-2 more times to ensure full independence and no further concerns.    Progression toward goals:  [X]       Improving appropriately and progressing toward goals  [ ]       Improving slowly and progressing toward goals  [ ]       Not making progress toward goals and plan of care will be adjusted       PLAN:  Patient continues to benefit from skilled intervention to address the above impairments. Continue treatment per established plan of care. Discharge Recommendations:  None  Further Equipment Recommendations for Discharge:  Tub transfer bench, reacher - patient aware       SUBJECTIVE:   Patient stated I'm hoping to go home tomorrow. None of this was planned so I'm trying to make it all work.       OBJECTIVE DATA SUMMARY:   Cognitive/Behavioral Status:  Neurologic State: Alert  Orientation Level: Oriented X4  Cognition: Appropriate decision making; Appropriate for age attention/concentration; Follows commands     Functional Mobility and Transfers for ADLs:  Bed Mobility:  Sit to Supine: Supervision     Transfers:  Sit to Stand: Supervision  Functional Transfers  Toilet Transfer : Supervision     Balance:  Sitting: Intact  Standing: Intact; With support     ADL Intervention:     Toileting  Bladder Hygiene: Independent  Clothing Management: Independent     Pain:  Pain Scale 1: Numeric (0 - 10)  Pain Intensity 1: 3  Pain Location 1: Back  Pain Orientation 1: Lower  Pain Description 1: Aching  Pain Intervention(s) 1: Medication (see MAR)  Activity Tolerance:   Good. Please refer to the flowsheet for vital signs taken during this treatment.   After treatment:   [ ] Patient left in no apparent distress sitting up in chair  [X] Patient left in no apparent distress in bed  [X] Call bell left within reach  [X] Nursing notified  [ ] Caregiver present  [ ] Bed alarm activated      COMMUNICATION/COLLABORATION:   The patients plan of care was discussed with: Registered Nurse     Lanny Coronel OT  Time Calculation: 14 mins

## 2017-09-24 NOTE — PROGRESS NOTES
Problem: Mobility Impaired (Adult and Pediatric)  Goal: *Acute Goals and Plan of Care (Insert Text)  Physical Therapy Goals    Goals reviewed 9/28/2017 and remain appropriate as patient continues to progress in acute rehab  1. Patient will ambulate with independence for 300 feet with the least restrictive device within 4 days. 2. Patient will ascend/descend 13 stairs with 1 handrail(s) with modified independence within 4 days. 3. Patient will verbalize and demonstrate understanding of spinal precautions (No bending, lifting greater than 5 lbs, or twisting; log-roll technique; frequent repositioning as instructed) within 4 days. Initiated 9/20/2017  1. Patient will move from supine to sit and sit to supine and roll side to side in bed with independence within 4 days. 2. Patient will perform sit to stand with independence within 4 days. 3. Patient will ambulate with independence for 150 feet with the least restrictive device within 4 days. 4. Patient will ascend/descend 13 stairs with 1 handrail(s) with modified independence within 4 days. 5. Patient will verbalize and demonstrate understanding of spinal precautions (No bending, lifting greater than 5 lbs, or twisting; log-roll technique; frequent repositioning as instructed) within 4 days. PHYSICAL THERAPY TREATMENT: WEEKLY REASSESSMENT  Patient: Tj Joel (43 y.o. female)  Date: 9/24/2017  Diagnosis: Intractable back pain  Leukocytosis  Hypokalemia  LUMBAR ABSCESS SIRS due to infectious process without acute organ dysfunction (HCC)  Procedure(s) (LRB):  SPINE LUMBAR LAMINECTOMY L3-L4 WITH EVACUATION OF ABSCESS  (N/A) 5 Days Post-Op  Precautions: Back      ASSESSMENT:  Chart reviewed and RN cleared patient for mobility. Patient was agreeable to participate and was received in bed. Patient required supervision for all mobility and maintains back precautions independently.  Per patient report she is able to ambulate to/from restroom with RN/PCT with less difficulty and pain has improved. In order to be cleared for home patient will need to manage 1 flight of stairs next session as patient is able to ambulate similar distance to/from stairwell this session. Patient ended session in bed with all needs were placed within reach and RN notified of patient's status. Verbal cueing and education were provided for RW safety and bed mobility tips, patient verbally acknowledged understanding. PT decreasing frequency to Daily as patient is progressing well and was encouraged to continue to ambulate with RN/PCT 1-2x/day throughout the day. Patient's progression toward goals since last assessment: Improved to supervision for all mobility       PLAN:  Goals have been updated based on progression since last assessment. Patient continues to benefit from skilled intervention to address the above impairments. Continue to follow the patient daily to address goals. Planned Interventions:  [X]              Bed Mobility Training             [X]       Neuromuscular Re-Education  [X]              Transfer Training                   [ ]       Orthotic/Prosthetic Training  [X]              Gait Training                         [ ]       Modalities  [X]              Therapeutic Exercises           [ ]       Edema Management/Control  [X]              Therapeutic Activities            [X]       Patient and Family Training/Education  [ ]              Other (comment):  Discharge Recommendations: Home Health  Further Equipment Recommendations for Discharge: TBD       SUBJECTIVE:   Patient stated My back sometimes get tight during these walks.       OBJECTIVE DATA SUMMARY:   Critical Behavior:  Neurologic State: Alert  Orientation Level: Oriented X4  Cognition: Appropriate decision making, Appropriate for age attention/concentration, Follows commands  Safety/Judgement: Awareness of environment, Good awareness of safety precautions (3/3 precautions demonstrated)     Strength: Functional Mobility Training:  Bed Mobility:        Sit to Supine: Supervision           Transfers:  Sit to Stand: Supervision  Stand to Sit: Supervision                             Balance:  Sitting: Intact  Standing: Intact; With support  Ambulation/Gait Training:  Distance (ft): 160 Feet (ft)  Assistive Device: Gait belt;Walker, rolling  Ambulation - Level of Assistance: Supervision        Gait Abnormalities: Antalgic;Decreased step clearance (rest break x 1)              Speed/Zehra: Pace decreased (<100 feet/min)                                  Stairs:           Neuro Re-Education:     Therapeutic Exercises:      Pain:  Pain Scale 1: Numeric (0 - 10)  Pain Intensity 1: 3  Pain Location 1: Back  Pain Orientation 1: Lower  Pain Description 1: Aching  Pain Intervention(s) 1: Medication (see MAR)  Activity Tolerance:   Good, patient required 1 rest break during walk, RW was lowered in attempt to relax shoulders in a more biomechanically sound posture  Please refer to the flowsheet for vital signs taken during this treatment.   After treatment:   [ ]  Patient left in no apparent distress sitting up in chair  [X]  Patient left in no apparent distress in bed  [X]  Call bell left within reach  [X]  Nursing notified  [ ]  Caregiver present  [ ]  Bed alarm activated      COMMUNICATION/COLLABORATION:   The patients plan of care was discussed with: Registered Nurse     Kathryn Gore PT, DPT   Time Calculation: 15 mins

## 2017-09-24 NOTE — PROGRESS NOTES
Hospitalist Progress Note  Office: [phone]  Cell: [phone]      Date of Service:  2017  NAME:  Tj Joel  :  1985  MRN:  [id number]      Admission Summary:   28 yo woman with lupus and obesity presented to the ED from home on 17 with back pain radiating to the right buttock and right leg. She was diagnosed with sciatica on 17. She was admitted inpatient medical floor for SIRS. Interval history / Subjective:   F/u back pain  No new issues     Assessment & Plan:     Lumbar epidural abscess  - CT abd/pelvis  nonobstructing L kidney stone otherwise no acute  - MRI lumbar  Extensive epidural abscess from the L2 level to the mid L5 in the posterior  right spinal canal, likely originating from inflammatory changes in the right L5-S1 facet joint. At least moderate thecal sac stenosis due to the collection. Soft tissue edema and small fluid collection abutting the posterior aspect of the right L5-S1 facet joint.   Probable small right psoas abscess  -MRI thoracic and cervical spine unremarkable  - BCx  suggestive of MRSA  -Repeat blood culture  MRSA  -Blood culture ,  negative so far  -Wound culture MRSA  - on vanc/Nafcillin started , now Nafcillin discontinued by ID  - s/p SPINE LUMBAR LAMINECTOMY L3-L4 WITH EVACUATION OF ABSCESS   -Avoid PICC till 2 blood cultures negative, likely tomorrow  -follow ID/Neurosurgery    Sepsis (POA)  -sec to above  -Echo unremarkable  -JOSE done , awaiting formal report, but patient claims that she was told that it was fine    Back pain with lumbar radiculopathy and recent dx sciatica (POA)  - pain control with scheduled TID ibuprofen, lidocaine patch, gabapentin; prn Percocet  - [facility]  reviewed  and last had Percocet 5/325 #20 ordered on 17 by PCP  - dx sciatica recently at [facility], sent home with prednisone but no improvement  - PT/OT evals  -Prednisone discontinued    Acute blood loss anemia  -from surgery  -Monitor h/h for now, no need for transfusion for now    Lupus   - Plaquenil held till infection resolved  - Rheum is Dr Tera Cates at [facility]    Obesity, Body mass index is 34.22 kg/(m^2). Hypokalemia (POA) - replete prn    Regular diet    PT/OT home health     Code status: full  DVT prophylaxis: SCDs  PTA: home    Plan: Follow cultures, ID, Neurosurgery. PICC? monday    Care Plan discussed with: Patient/Family, Nurse and   Disposition: Likely discharge home Monday with [address] Problems  Date Reviewed: 9/18/2017          Codes Class Noted POA    Hypokalemia ICD-10-CM: E87.6  ICD-9-CM: 276.8  9/18/2017 Unknown        Intractable back pain ICD-10-CM: M54.9  ICD-9-CM: 724.5  9/18/2017 Unknown        * (Principal)SIRS due to infectious process without acute organ dysfunction ([facility] [address] ICD-10-CM: A41.9  ICD-9-CM: 038.9, 995.91  9/18/2017 Yes        Obesity (BMI 30-39.9) (Chronic) ICD-10-CM: E66.9  ICD-9-CM: 278.00  9/18/2017 Yes        Bacteriuria ICD-10-CM: R82.71  ICD-9-CM: 791.9  9/18/2017 Yes            Review of Systems:   Pertinent items are noted in HPI. Vital Signs:    Last 24hrs VS reviewed since prior progress note.  Most recent are:  Visit Vitals    /82 (BP 1 Location: Right arm, BP Patient Position: At rest)    Pulse 82    Temp 98.5 °F (36.9 °C)    Resp 16    Ht 5' 6\" (1.676 m)    Wt 96.2 kg (212 lb)    SpO2 99%    BMI 34.22 kg/m2     No intake or output data in the 24 hours ending 09/24/17 1616     Physical Examination:     Constitutional:  awake, no acute distress, cooperative, pleasant, tearful, obese   ENT:  Oral mucosa moist, oropharynx benign  Neck supple, no masses   Resp:  CTA bilaterally, no wheezing/rhonchi/rales   CV:  regular rhythm, tachycardic, no m/r/g appreciated, no peripheral edema, +pulses    GI:  +BS, soft, non distended, non tender, obese     Musculoskeletal:  moves all extremities but reduced ROM b/l LEs; straight leg raise + on right    Neurologic:  AAOx3, NFD     Skin:  warm, dry  Eyes:  PERRL    Data Review:    Review and/or order of clinical lab test  Review and/or order of tests in the radiology section of CPT  Review and/or order of tests in the medicine section of [facility]    Labs:     Recent Labs      09/23/17   2349   WBC  9.4   HGB  10.6*   HCT  32.4*   PLT  364     Recent Labs      09/23/17   2349   NA  140   K  3.7   CL  109*   CO2  21   BUN  13   CREA  0.74   GLU  103*   CA  8.4*     Recent Labs      09/23/17   2349   SGOT  23   ALT  48   AP  82   TBILI  0.2   TP  6.4   ALB  2.2*   GLOB  4.2*     No results for input(s): INR, PTP, APTT in the last 72 hours. No lab exists for component: INREXT, INREXT   No results for input(s): FE, TIBC, PSAT, FERR in the last 72 hours. No results found for: FOL, RBCF   No results for input(s): PH, PCO2, PO2 in the last 72 hours. No results for input(s): CPK, CKNDX, TROIQ in the last 72 hours.     No lab exists for component: CPKMB  No results found for: CHOL, CHOLX, CHLST, CHOLV, HDL, LDL, LDLC, DLDLP, TGLX, TRIGL, TRIGP, CHHD, CHHDX  Lab Results   Component Value Date/Time    Glucose (POC) 102 09/22/2017 06:57 AM     Lab Results   Component Value Date/Time    Color YELLOW/STRAW 09/18/2017 03:32 AM    Appearance CLEAR 09/18/2017 03:32 AM    Specific gravity 1.030 09/18/2017 03:32 AM    Specific gravity 1.024 03/20/2015 02:41 PM    pH (UA) 6.0 09/18/2017 03:32 AM    Protein NEGATIVE  09/18/2017 03:32 AM    Glucose NEGATIVE  09/18/2017 03:32 AM    Ketone NEGATIVE  09/18/2017 03:32 AM    Bilirubin NEGATIVE  09/18/2017 03:32 AM    Urobilinogen 0.2 09/18/2017 03:32 AM    Nitrites NEGATIVE  09/18/2017 03:32 AM    Leukocyte Esterase NEGATIVE  09/18/2017 03:32 AM    Epithelial cells MANY 09/18/2017 03:32 AM    Bacteria 1+ 09/18/2017 03:32 AM    WBC 0-4 09/18/2017 03:32 AM    RBC 0-5 09/18/2017 03:32 AM     Medications Reviewed:     Current Facility-Administered Medications   Medication Dose Route Frequency    gabapentin (NEURONTIN) capsule 200 mg  200 mg Oral TID    oxyCODONE-acetaminophen (PERCOCET) 5-325 mg per tablet 1-2 Tab  1-2 Tab Oral Q4H PRN    lactobac ac& pc-s.therm-b.anim (ELLEN Q/RISAQUAD)  1 Cap Oral DAILY    vancomycin (VANCOCIN) 1500 mg in  ml infusion  1,500 mg IntraVENous Q8H    Vancomycin - Pharmacy to Dose   Other Rx Dosing/Monitoring    sodium chloride (NS) flush 5-10 mL  5-10 mL IntraVENous Q8H    sodium chloride (NS) flush 5-10 mL  5-10 mL IntraVENous PRN    acetaminophen (TYLENOL) tablet 650 mg  650 mg Oral Q4H PRN    ondansetron (ZOFRAN) injection 4 mg  4 mg IntraVENous Q4H PRN    phenol throat spray (CHLORASEPTIC) 1 Spray  1 Spray Oral PRN    diphenhydrAMINE (BENADRYL) capsule 25 mg  25 mg Oral Q4H PRN    cyclobenzaprine (FLEXERIL) tablet 10 mg  10 mg Oral TID PRN    sodium chloride (NS) flush 5-10 mL  5-10 mL IntraVENous Q8H    sodium chloride (NS) flush 5-10 mL  5-10 mL IntraVENous PRN    topiramate (TOPAMAX) tablet 50 mg  50 mg Oral BID    senna-docusate (PERICOLACE) 8.6-50 mg per tablet 1 Tab  1 Tab Oral DAILY    escitalopram oxalate (LEXAPRO) tablet 10 mg  10 mg Oral DAILY    buPROPion SR (WELLBUTRIN SR) tablet 150 mg  150 mg Oral BID    morphine injection 2 mg  2 mg IntraVENous Q4H PRN    butalbital-acetaminophen-caffeine (FIORICET, ESGIC) -40 mg per tablet 1 Tab  1 Tab Oral Q6H PRN     ______________________________________________________________________  EXPECTED LENGTH OF STAY: 3d 2h  ACTUAL LENGTH OF STAY:          6                 Juan Ribeiro MD

## 2017-09-24 NOTE — PROGRESS NOTES
Problem: Falls - Risk of  Goal: *Absence of Falls  Document Brandin Fall Risk and appropriate interventions in the flowsheet.    Outcome: Progressing Towards Goal  Fall Risk Interventions:  Mobility Interventions: Communicate number of staff needed for ambulation/transfer, Patient to call before getting OOB           Medication Interventions: Patient to call before getting OOB, Teach patient to arise slowly     Elimination Interventions: Call light in reach, Patient to call for help with toileting needs, Toilet paper/wipes in reach

## 2017-09-25 ENCOUNTER — HOME HEALTH ADMISSION (OUTPATIENT)
Dept: HOME HEALTH SERVICES | Facility: HOME HEALTH | Age: 32
End: 2017-09-25
Payer: COMMERCIAL

## 2017-09-25 VITALS
TEMPERATURE: 98.7 F | RESPIRATION RATE: 16 BRPM | OXYGEN SATURATION: 98 % | HEIGHT: 66 IN | DIASTOLIC BLOOD PRESSURE: 73 MMHG | HEART RATE: 97 BPM | BODY MASS INDEX: 34.07 KG/M2 | SYSTOLIC BLOOD PRESSURE: 108 MMHG | WEIGHT: 212 LBS

## 2017-09-25 LAB
ANION GAP SERPL CALC-SCNC: 9 MMOL/L (ref 5–15)
BUN SERPL-MCNC: 9 MG/DL (ref 6–20)
BUN/CREAT SERPL: 12 (ref 12–20)
CALCIUM SERPL-MCNC: 8.6 MG/DL (ref 8.5–10.1)
CHLORIDE SERPL-SCNC: 106 MMOL/L (ref 97–108)
CO2 SERPL-SCNC: 22 MMOL/L (ref 21–32)
CREAT SERPL-MCNC: 0.78 MG/DL (ref 0.55–1.02)
DATE LAST DOSE: ABNORMAL
GLUCOSE SERPL-MCNC: 179 MG/DL (ref 65–100)
POTASSIUM SERPL-SCNC: 3.5 MMOL/L (ref 3.5–5.1)
REPORTED DOSE,DOSE: ABNORMAL UNITS
REPORTED DOSE/TIME,TMG: 1600
SODIUM SERPL-SCNC: 137 MMOL/L (ref 136–145)
VANCOMYCIN TROUGH SERPL-MCNC: 14.7 UG/ML (ref 5–10)

## 2017-09-25 PROCEDURE — 97530 THERAPEUTIC ACTIVITIES: CPT

## 2017-09-25 PROCEDURE — 80202 ASSAY OF VANCOMYCIN: CPT | Performed by: INTERNAL MEDICINE

## 2017-09-25 PROCEDURE — 77030018719 HC DRSG PTCH ANTIMIC J&J -A

## 2017-09-25 PROCEDURE — 74011250637 HC RX REV CODE- 250/637: Performed by: NURSE PRACTITIONER

## 2017-09-25 PROCEDURE — 77030020847 HC STATLOK BARD -A

## 2017-09-25 PROCEDURE — 74011250637 HC RX REV CODE- 250/637: Performed by: INTERNAL MEDICINE

## 2017-09-25 PROCEDURE — 36569 INSJ PICC 5 YR+ W/O IMAGING: CPT | Performed by: HOSPITALIST

## 2017-09-25 PROCEDURE — 77030012893

## 2017-09-25 PROCEDURE — 02HV33Z INSERTION OF INFUSION DEVICE INTO SUPERIOR VENA CAVA, PERCUTANEOUS APPROACH: ICD-10-PCS | Performed by: HOSPITALIST

## 2017-09-25 PROCEDURE — 97535 SELF CARE MNGMENT TRAINING: CPT

## 2017-09-25 PROCEDURE — C1751 CATH, INF, PER/CENT/MIDLINE: HCPCS

## 2017-09-25 PROCEDURE — 77030020365 HC SOL INJ SOD CL 0.9% 50ML

## 2017-09-25 PROCEDURE — 36415 COLL VENOUS BLD VENIPUNCTURE: CPT | Performed by: INTERNAL MEDICINE

## 2017-09-25 PROCEDURE — 74011250637 HC RX REV CODE- 250/637: Performed by: HOSPITALIST

## 2017-09-25 PROCEDURE — 74011250636 HC RX REV CODE- 250/636: Performed by: INTERNAL MEDICINE

## 2017-09-25 PROCEDURE — 97116 GAIT TRAINING THERAPY: CPT

## 2017-09-25 PROCEDURE — 76937 US GUIDE VASCULAR ACCESS: CPT

## 2017-09-25 PROCEDURE — 80048 BASIC METABOLIC PNL TOTAL CA: CPT | Performed by: INTERNAL MEDICINE

## 2017-09-25 RX ORDER — GABAPENTIN 100 MG/1
200 CAPSULE ORAL 3 TIMES DAILY
Qty: 180 CAP | Refills: 1 | Status: SHIPPED | OUTPATIENT
Start: 2017-09-25 | End: 2019-11-20 | Stop reason: ALTCHOICE

## 2017-09-25 RX ORDER — AMOXICILLIN 250 MG
1 CAPSULE ORAL
Qty: 30 TAB | Refills: 0 | Status: SHIPPED | OUTPATIENT
Start: 2017-09-25 | End: 2017-11-10

## 2017-09-25 RX ORDER — SODIUM CHLORIDE 0.9 % (FLUSH) 0.9 %
10 SYRINGE (ML) INJECTION EVERY 8 HOURS
Status: DISCONTINUED | OUTPATIENT
Start: 2017-09-25 | End: 2017-09-25 | Stop reason: HOSPADM

## 2017-09-25 RX ORDER — SODIUM CHLORIDE 0.9 % (FLUSH) 0.9 %
20 SYRINGE (ML) INJECTION AS NEEDED
Status: DISCONTINUED | OUTPATIENT
Start: 2017-09-25 | End: 2017-09-25 | Stop reason: HOSPADM

## 2017-09-25 RX ORDER — CYCLOBENZAPRINE HCL 10 MG
10 TABLET ORAL
Qty: 30 TAB | Refills: 0 | Status: SHIPPED | OUTPATIENT
Start: 2017-09-25

## 2017-09-25 RX ORDER — BACITRACIN 500 UNIT/G
1 PACKET (EA) TOPICAL AS NEEDED
Status: DISCONTINUED | OUTPATIENT
Start: 2017-09-25 | End: 2017-09-25 | Stop reason: HOSPADM

## 2017-09-25 RX ORDER — SODIUM CHLORIDE 0.9 % (FLUSH) 0.9 %
10 SYRINGE (ML) INJECTION EVERY 24 HOURS
Status: DISCONTINUED | OUTPATIENT
Start: 2017-09-25 | End: 2017-09-25 | Stop reason: HOSPADM

## 2017-09-25 RX ORDER — ALPRAZOLAM 0.25 MG/1
0.25 TABLET ORAL ONCE
Status: COMPLETED | OUTPATIENT
Start: 2017-09-25 | End: 2017-09-25

## 2017-09-25 RX ORDER — SODIUM CHLORIDE 0.9 % (FLUSH) 0.9 %
10 SYRINGE (ML) INJECTION AS NEEDED
Status: DISCONTINUED | OUTPATIENT
Start: 2017-09-25 | End: 2017-09-25 | Stop reason: HOSPADM

## 2017-09-25 RX ORDER — OXYCODONE AND ACETAMINOPHEN 5; 325 MG/1; MG/1
1 TABLET ORAL
Qty: 20 TAB | Refills: 0 | Status: SHIPPED | OUTPATIENT
Start: 2017-09-25 | End: 2017-11-02 | Stop reason: ALTCHOICE

## 2017-09-25 RX ADMIN — Medication 1 CAPSULE: at 09:17

## 2017-09-25 RX ADMIN — ALPRAZOLAM 0.25 MG: 0.25 TABLET ORAL at 15:47

## 2017-09-25 RX ADMIN — Medication 10 ML: at 16:03

## 2017-09-25 RX ADMIN — OXYCODONE HYDROCHLORIDE AND ACETAMINOPHEN 2 TABLET: 5; 325 TABLET ORAL at 13:22

## 2017-09-25 RX ADMIN — TOPIRAMATE 50 MG: 25 TABLET, FILM COATED ORAL at 18:01

## 2017-09-25 RX ADMIN — GABAPENTIN 200 MG: 100 CAPSULE ORAL at 16:00

## 2017-09-25 RX ADMIN — OXYCODONE HYDROCHLORIDE AND ACETAMINOPHEN 2 TABLET: 5; 325 TABLET ORAL at 08:34

## 2017-09-25 RX ADMIN — BUPROPION HYDROCHLORIDE 150 MG: 150 TABLET, EXTENDED RELEASE ORAL at 18:01

## 2017-09-25 RX ADMIN — VANCOMYCIN HYDROCHLORIDE 1500 MG: 10 INJECTION, POWDER, LYOPHILIZED, FOR SOLUTION INTRAVENOUS at 06:10

## 2017-09-25 RX ADMIN — OXYCODONE HYDROCHLORIDE AND ACETAMINOPHEN 2 TABLET: 5; 325 TABLET ORAL at 03:32

## 2017-09-25 RX ADMIN — Medication 10 ML: at 14:00

## 2017-09-25 RX ADMIN — TOPIRAMATE 50 MG: 25 TABLET, FILM COATED ORAL at 09:17

## 2017-09-25 RX ADMIN — Medication 10 ML: at 06:00

## 2017-09-25 RX ADMIN — DOCUSATE SODIUM AND SENNOSIDES 1 TABLET: 8.6; 5 TABLET, FILM COATED ORAL at 09:17

## 2017-09-25 RX ADMIN — CYCLOBENZAPRINE HYDROCHLORIDE 10 MG: 10 TABLET, FILM COATED ORAL at 06:10

## 2017-09-25 RX ADMIN — OXYCODONE HYDROCHLORIDE AND ACETAMINOPHEN 2 TABLET: 5; 325 TABLET ORAL at 18:01

## 2017-09-25 RX ADMIN — ESCITALOPRAM OXALATE 10 MG: 10 TABLET ORAL at 09:17

## 2017-09-25 RX ADMIN — GABAPENTIN 200 MG: 100 CAPSULE ORAL at 09:18

## 2017-09-25 RX ADMIN — BUPROPION HYDROCHLORIDE 150 MG: 150 TABLET, EXTENDED RELEASE ORAL at 09:17

## 2017-09-25 RX ADMIN — VANCOMYCIN HYDROCHLORIDE 1500 MG: 10 INJECTION, POWDER, LYOPHILIZED, FOR SOLUTION INTRAVENOUS at 15:53

## 2017-09-25 NOTE — PROGRESS NOTES
Unable to successfully obtain lab draw via venipuncture. Pt has history of difficulty obtaining samples with multiple sticks per nursing and ICU staff. Pt does not want staff to retry at this time and says she should be receiving a PICC line today and going home. [facility] if pt agreeable to lab draw later this a.m and to follow up with MD to advise. 0900 Bedside and Verbal shift change report given to [address] [place] [address] [address] (oncoming nurse) by Andrea Tubbs RN (offgoing nurse). Report included the following information SBAR, Kardex, Intake/Output, MAR and Recent Results.

## 2017-09-25 NOTE — PROGRESS NOTES
Cm noted consult for home health PT/OT and sent referral to [facility]. Once PICC line is placed and IV ABX orders are written, cm will contact [facility] to obtain cost/schedule for home IV ABX. Jaz CAMPBELL, [place]    Referral to [address] [place] [address] Drive cancelled; referral sent to [facility]. Will follow.   Advance Haresh , [place]

## 2017-09-25 NOTE — PROGRESS NOTES
Problem: Self Care Deficits Care Plan (Adult)  Goal: *Acute Goals and Plan of Care (Insert Text)  Occupational Therapy Goals  Initiated 9/20/2017  1. Patient will perform lower body dressing with modified independence using AE PRN within 7 days. 2. Patient will perform toileting with modified independence using most appropriate DME within 7 days. 3. Patient will perform standing 5 mins at modified independence within 7 days. 4. Patient will perform gathering ADL items high and low without cues safety, technique or tool use 4/4 at modified independence within 7 days. 5. Patient will verbalize/demonstrate 3/3 back precautions during ADL tasks without cues within 7 days. OCCUPATIONAL THERAPY TREATMENT  Patient: Antonia Walker (3 y.o. female)  Date: 9/25/2017  Diagnosis: Intractable back pain  Leukocytosis  Hypokalemia  LUMBAR ABSCESS SIRS due to infectious process without acute organ dysfunction (HCC)  Procedure(s) (LRB):  SPINE LUMBAR LAMINECTOMY L3-L4 WITH EVACUATION OF ABSCESS  (N/A) 6 Days Post-Op  Precautions: Back No bending, no lifting greater than 5 lbs, no twisting, log-roll technique, repositioning every 20-30 min except when sleeping,       ASSESSMENT:  Pt received supine in bed crying and dabbing her eyes with tissues. Pt reported having personal issues with her ex  and requested her muscle relaxer due to increased pain. Pt reported feeling extremely overwhelmed at this time due to issues with her ex-/children while trying to deal with her own rehabilitation from spinal surgery. Validated patient's current feelings and provided empathetic listening. Pt declined any OOB activity at this time due to feeling so overwhelmed however was receptive to in bed education on tub transfers/bathing (expressed this as her only concern about ADLs at home).   Pt declined the tub transfer bench at this time and was provided education on how to safely complete a step over transfer (recommended having supervision and practicing this prior to actual shower). If any concerns recommend HH assess further. Nursing entering room at end of session and notified of request for muscle relaxer. Suggest following up with LB dressing/gathering objects around room next session. Progression toward goals:  [X]          Improving appropriately and progressing toward goals  [ ]          Improving slowly and progressing toward goals  [ ]          Not making progress toward goals and plan of care will be adjusted       PLAN:  Patient continues to benefit from skilled intervention to address the above impairments. Continue treatment per established plan of care. Discharge Recommendations:  Home Health  Further Equipment Recommendations for Discharge:  Tub transfer bench (pt currently denying need)       SUBJECTIVE:   Patient stated I've just got so much going on right now.   The patient stated 3/3 back precautions. Reviewed all 3 with patient.       OBJECTIVE DATA SUMMARY:   Cognitive/Behavioral Status:  Neurologic State: Alert, Appropriate for age  Orientation Level: Appropriate for age  Cognition: Appropriate decision making, Appropriate for age attention/concentration, Appropriate safety awareness, Follows commands  Safety/Judgement: Fall prevention     Functional Mobility and Transfers for ADLs:  Bed Mobility:  Rolling: Independent  Supine to Sit: Independent  Sit to Supine: Independent  Scooting: Independent     Transfers:  Sit to Stand: Modified independent  Functional Transfers  Tub Transfer:  (education provided on safe tub transfers)     Balance:  Sitting: Intact  Standing: Impaired  Standing - Static: Good  Standing - Dynamic : Good     ADL Intervention and Instruction:     Cognitive Retraining  Safety/Judgement: Fall prevention      Bathing: Patient instructed and indicated understanding when bathing to not submerge wound in water, stand to shower or sponge bathe, cover wound with plastic and tape to ensure no water reaches bandage/wound without cues. Tub transfer: Patient instructed and indicated understanding regarding when it is safe to begin transfer into tub (complete stairs with PT, advance exercises with PT high enough to clear tub height, and while clothes donned practice with another person present). Patient instructed and indicated understanding to use the same technique as used with stairs when entering and exiting tub (\"up with good leg, down with bad leg\"). Patient instructed and indicated understanding the benefits of maintaining activity tolerance, functional mobility, and independence with self care tasks during acute stay  to ensure safe return home and to baseline. Encouraged patient to increase frequency and duration OOB, not sitting longer than 30 mins without marching/walking with staff, be out of bed for all meals, perform daily ADLs (as approved by RN/MD regarding bathing etc), and performing functional mobility to/from bathroom. Patient instruction and indicated understanding on body mechanics, ergonomics and gravitational force on the spine during different body positions to plan activities in prep for return home to complete instrumental ADLs and back to work safely. Pain:  Pain Scale 1: Numeric (0 - 10)  Pain Intensity 1: 5  Pain Location 1: Back  Pain Orientation 1: Posterior  Pain Description 1: Tightness  Pain Intervention(s) 1: Medication (see MAR)  Activity Tolerance:   Limited by current emotional state     Please refer to the flowsheet for vital signs taken during this treatment.   After treatment:   [ ] Patient left in no apparent distress sitting up in chair  [ ] Patient left in no apparent distress in bed  [ ] Call bell left within reach  [ ] Nursing notified  [ ] Caregiver present  [ ] Bed alarm activated      COMMUNICATION/COLLABORATION:   The patients plan of care was discussed with: Physical Therapist and Registered Nurse     Hosea Esteves OT  Time Calculation: 12 mins

## 2017-09-25 NOTE — PROGRESS NOTES
Hospitalist Progress Note  Office: [phone]  Cell: [phone]      Date of Service:  2017  NAME:  Romina Angulo  :  1985  MRN:  [id number]      Admission Summary:   28 yo woman with lupus and obesity presented to the ED from home on 17 with back pain radiating to the right buttock and right leg. She was diagnosed with sciatica on 17. She was admitted inpatient medical floor for SIRS. Interval history / Subjective:   F/u back pain  No new issues     Assessment & Plan:     Lumbar epidural abscess  - CT abd/pelvis  nonobstructing L kidney stone otherwise no acute  - MRI lumbar  Extensive epidural abscess from the L2 level to the mid L5 in the posterior  right spinal canal, likely originating from inflammatory changes in the right L5-S1 facet joint. At least moderate thecal sac stenosis due to the collection. Soft tissue edema and small fluid collection abutting the posterior aspect of the right L5-S1 facet joint.   Probable small right psoas abscess  -MRI thoracic and cervical spine unremarkable  - BCx  suggestive of MRSA  -Repeat blood culture  MRSA  -Blood culture ,  negative so far  -Wound culture MRSA  - on vanc/Nafcillin started , Nafcillin discontinued by ID  - s/p SPINE LUMBAR LAMINECTOMY L3-L4 WITH EVACUATION OF ABSCESS   -Spoke to ID, will get PICC today,  and probable discharge today with IV antibiotics  -Appreciate ID/Neurosurgery    Sepsis (POA)  -sec to above  -Echo unremarkable  -JOSE done , within normal limit, no vegetations    Back pain with lumbar radiculopathy and recent dx sciatica (POA)  - pain control with scheduled TID ibuprofen, lidocaine patch, gabapentin; prn Percocet  - [facility]  and last had Percocet 5/325 #20 ordered on 17 by PCP  - dx sciatica recently at [address] [place] [address] Urgent Care, sent home with prednisone but no improvement  - PT/OT evals  -Prednisone discontinued    Acute blood loss anemia  -from surgery  -Monitor h/h for now, no need for transfusion for now    Lupus   - Plaquenil held till infection resolved  - Rheum is Dr Domingo Villafuerte at [facility]    Chest pain  -likely panic attack  -The patient claims that she is upset because of what her ex  said  -Will give her Xanax and if feels better, no further work up    Obesity, Body mass index is 34.22 kg/(m^2). Hypokalemia (POA) - replete prn    Regular diet    PT/OT home health     Code status: full  DVT prophylaxis: SCDs  PTA: home    Plan: PICC line today and then discharge today with IV antibiotics    Care Plan discussed with: Patient/Family, Nurse and   Disposition: Likely discharge home today     Hospital Problems  Date Reviewed: 9/18/2017          Codes Class Noted POA    Hypokalemia ICD-10-CM: E87.6  ICD-9-CM: 276.8  9/18/2017 Unknown        Intractable back pain ICD-10-CM: M54.9  ICD-9-CM: 724.5  9/18/2017 Unknown        * (Principal)SIRS due to infectious process without acute organ dysfunction ([facility] [address] ICD-10-CM: A41.9  ICD-9-CM: 038.9, 995.91  9/18/2017 Yes        Obesity (BMI 30-39.9) (Chronic) ICD-10-CM: E66.9  ICD-9-CM: 278.00  9/18/2017 Yes        Bacteriuria ICD-10-CM: R82.71  ICD-9-CM: 791.9  9/18/2017 Yes            Review of Systems:   Pertinent items are noted in HPI. Vital Signs:    Last 24hrs VS reviewed since prior progress note.  Most recent are:  Visit Vitals    BP (!) 120/95 (BP 1 Location: Left arm, BP Patient Position: At rest)    Pulse 98    Temp 97.8 °F (36.6 °C)    Resp 16    Ht 5' 6\" (1.676 m)    Wt 96.2 kg (212 lb)    SpO2 99%    BMI 34.22 kg/m2     No intake or output data in the 24 hours ending 09/25/17 1142     Physical Examination:     Constitutional:  awake, no acute distress, cooperative, pleasant, tearful, obese   ENT:  Oral mucosa moist, oropharynx benign  Neck supple, no masses   Resp:  CTA bilaterally, no wheezing/rhonchi/rales CV:  regular rhythm, tachycardic, no m/r/g appreciated, no peripheral edema, +pulses    GI:  +BS, soft, non distended, non tender, obese     Musculoskeletal:  moves all extremities but reduced ROM b/l LEs; straight leg raise + on right    Neurologic:  AAOx3, NFD     Skin:  warm, dry  Eyes:  PERRL    Data Review:    Review and/or order of clinical lab test  Review and/or order of tests in the radiology section of CPT  Review and/or order of tests in the medicine section of CPT    Labs:     Recent Labs      09/23/17   2349   WBC  9.4   HGB  10.6*   HCT  32.4*   PLT  364     Recent Labs      09/23/17   2349   NA  140   K  3.7   CL  109*   CO2  21   BUN  13   CREA  0.74   GLU  103*   CA  8.4*     Recent Labs      09/23/17   2349   SGOT  23   ALT  48   AP  82   TBILI  0.2   TP  6.4   ALB  2.2*   GLOB  4.2*     No results for input(s): INR, PTP, APTT in the last 72 hours. No lab exists for component: INREXT, INREXT   No results for input(s): FE, TIBC, PSAT, FERR in the last 72 hours. No results found for: FOL, RBCF   No results for input(s): PH, PCO2, PO2 in the last 72 hours. No results for input(s): CPK, CKNDX, TROIQ in the last 72 hours.     No lab exists for component: CPKMB  No results found for: CHOL, CHOLX, CHLST, CHOLV, HDL, LDL, LDLC, DLDLP, TGLX, TRIGL, TRIGP, CHHD, CHHDX  Lab Results   Component Value Date/Time    Glucose (POC) 102 09/22/2017 06:57 AM     Lab Results   Component Value Date/Time    Color YELLOW/STRAW 09/18/2017 03:32 AM    Appearance CLEAR 09/18/2017 03:32 AM    Specific gravity 1.030 09/18/2017 03:32 AM    Specific gravity 1.024 03/20/2015 02:41 PM    pH (UA) 6.0 09/18/2017 03:32 AM    Protein NEGATIVE  09/18/2017 03:32 AM    Glucose NEGATIVE  09/18/2017 03:32 AM    Ketone NEGATIVE  09/18/2017 03:32 AM    Bilirubin NEGATIVE  09/18/2017 03:32 AM    Urobilinogen 0.2 09/18/2017 03:32 AM    Nitrites NEGATIVE  09/18/2017 03:32 AM    Leukocyte Esterase NEGATIVE  09/18/2017 03:32 AM Epithelial cells MANY 09/18/2017 03:32 AM    Bacteria 1+ 09/18/2017 03:32 AM    WBC 0-4 09/18/2017 03:32 AM    RBC 0-5 09/18/2017 03:32 AM     Medications Reviewed:     Current Facility-Administered Medications   Medication Dose Route Frequency    vancomycin trough Monday 9/25 at 2pm   Other ONCE    gabapentin (NEURONTIN) capsule 200 mg  200 mg Oral TID    oxyCODONE-acetaminophen (PERCOCET) 5-325 mg per tablet 1-2 Tab  1-2 Tab Oral Q4H PRN    lactobac ac& pc-s.therm-b.anim (ELLEN Q/RISAQUAD)  1 Cap Oral DAILY    vancomycin (VANCOCIN) 1500 mg in  ml infusion  1,500 mg IntraVENous Q8H    Vancomycin - Pharmacy to Dose   Other Rx Dosing/Monitoring    sodium chloride (NS) flush 5-10 mL  5-10 mL IntraVENous Q8H    sodium chloride (NS) flush 5-10 mL  5-10 mL IntraVENous PRN    acetaminophen (TYLENOL) tablet 650 mg  650 mg Oral Q4H PRN    ondansetron (ZOFRAN) injection 4 mg  4 mg IntraVENous Q4H PRN    phenol throat spray (CHLORASEPTIC) 1 Spray  1 Spray Oral PRN    diphenhydrAMINE (BENADRYL) capsule 25 mg  25 mg Oral Q4H PRN    cyclobenzaprine (FLEXERIL) tablet 10 mg  10 mg Oral TID PRN    topiramate (TOPAMAX) tablet 50 mg  50 mg Oral BID    senna-docusate (PERICOLACE) 8.6-50 mg per tablet 1 Tab  1 Tab Oral DAILY    escitalopram oxalate (LEXAPRO) tablet 10 mg  10 mg Oral DAILY    buPROPion SR (WELLBUTRIN SR) tablet 150 mg  150 mg Oral BID    morphine injection 2 mg  2 mg IntraVENous Q4H PRN    butalbital-acetaminophen-caffeine (FIORICET, ESGIC) -40 mg per tablet 1 Tab  1 Tab Oral Q6H PRN     ______________________________________________________________________  EXPECTED LENGTH OF STAY: 3d 2h  ACTUAL LENGTH OF STAY:          7                 Martha Bosworth, MD

## 2017-09-25 NOTE — PROGRESS NOTES
Leticia Nolan is a 27 y. o. with a history of SLE followed at [address] [place] [address] on [facility] presents with back pain for more than a week. Pain radiating down rt buttock X 1 week, Fever X 2 days  Pt says she developed severe low back pain more on the right side 10 days ago and was taking diclofenac. The pain was radiating down her rt buttock. she had gone to [facility] [place] on 9/9/ and was given prednisone 20mg  and sent home. She went to her PCP who asked her to increase the steroids. She returned to ED on 9/18 as she was not getting better. She also started having fever and chills. In the ED she had spinal xray , wbc was > 16 and had fever. She later had MRI which revealed Extensive epidural abscess from the L2 level to the mid L5 in the posterior right spinal canal, likely originating from inflammatory changes in the right L5-S1 facet joint. She was seen by neurosurgeon who asked for MRI of the thoracic and cervical spine because eof pain. Pt was in [facility] [place] in July 2017 with back pain after her 11 yr old son jumped on her back. She had Xray lumbar spine which was normal. She was asked to take analgesics. 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 She says she had a face cyst 3 weeks ago and it was squeezed and purulent discharge was obtained and her PCP gave her bactrim with near resolution. Subjective  Patient has back pain but stays on top of it by taking percocet proactively  Objective:   VITALS:    Patient Vitals for the past 12 hrs:   Temp Pulse Resp BP SpO2   09/25/17 0902 97.8 °F (36.6 °C) 98 16 (!) 120/95 99 %   09/25/17 0307 98.5 °F (36.9 °C) 75 16 109/73 98 %        PHYSICAL EXAM:   General:                    Alert   Lungs:                       B/l air entry. Heart:                                  s1s2  Abdomen:                  Soft,   Extremities:               Extremities normal, atraumatic, no cyanosis. No edema. No clubbing  Skin:                                    No rashes or lesions.   Not Jaundiced  Lymph:                      Cervical, supraclavicular normal.  Neurologic:                Grossly non-focal     Pertinent Labs   Wbc 19.3>16.8>9.4  Hb 10.4    Cr 0.83>.0.69>0.74  Bc MRSA- vanco MIC1  Wound culture MRSA        IMAGING RESULTS:       Impression/Recommendation     27 yr female with lupus on plaquenil presents with 1 week h/o rt sided low back pain with radiation      L5-S1 facet joint infection and  epidural abscess extending between l2-l5 due MRSA  S/p laminectomy l3-L4  and drainage of epidural abscess    MRSA bacteremia- on vanco( DEMETRI 1)  9/21 and 9/22 BC neg so far  If they remain neg PICC can be placed on monday  2d echo- no obvious veg   JOSE no vegetation     Lupus- on plaquenil- hold until clearance of bacteremia and may be can started after 2 weeks     Discussed the management with the patient

## 2017-09-25 NOTE — PROGRESS NOTES
Problem: Mobility Impaired (Adult and Pediatric)  Goal: *Acute Goals and Plan of Care (Insert Text)  Physical Therapy Goals    Goals reviewed 9/28/2017 and remain appropriate as patient continues to progress in acute rehab  1. Patient will ambulate with independence for 300 feet with the least restrictive device within 4 days. 2. Patient will ascend/descend 13 stairs with 1 handrail(s) with modified independence within 4 days. 3. Patient will verbalize and demonstrate understanding of spinal precautions (No bending, lifting greater than 5 lbs, or twisting; log-roll technique; frequent repositioning as instructed) within 4 days. Initiated 9/20/2017  1. Patient will move from supine to sit and sit to supine and roll side to side in bed with independence within 4 days. 2. Patient will perform sit to stand with independence within 4 days. 3. Patient will ambulate with independence for 150 feet with the least restrictive device within 4 days. 4. Patient will ascend/descend 13 stairs with 1 handrail(s) with modified independence within 4 days. 5. Patient will verbalize and demonstrate understanding of spinal precautions (No bending, lifting greater than 5 lbs, or twisting; log-roll technique; frequent repositioning as instructed) within 4 days.    PHYSICAL THERAPY TREATMENT  Patient: Antonia Walker (88 y.o. female)  Date: 9/25/2017  Diagnosis: Intractable back pain  Leukocytosis  Hypokalemia  LUMBAR ABSCESS SIRS due to infectious process without acute organ dysfunction (HCC)  Procedure(s) (LRB):  SPINE LUMBAR LAMINECTOMY L3-L4 WITH EVACUATION OF ABSCESS  (N/A) 6 Days Post-Op  Precautions: Back,No bending, no lifting greater than 5 lbs, no twisting, log-roll technique, repositioning every 20-30 min except when sleeping  ASSESSMENT:  Pt is making slow, steady progress, pt recalls 3 of 3 precautions however needs reminding to avoid twisting mostly in bed, pt demonstrated independence with bed mobility, modified independence sit <> stand, and tolerated ambulation with a rolling walker x 150 feet with modified independence, cleared on full flight of stairs with 1 rail and CGA, pt reports her back pain increased following stairs, her pace decreased while ambulating back to room, occasionally stopping due to pain, requested to return to bed following session, pt lives alone and will need to modify her ADLs while restricted by back precautions, recommend home health PT for progressive gait and balance training and to continue education regarding back precautions Progression toward goals:  [ ]      Improving appropriately and progressing toward goals  [X]      Improving slowly and progressing toward goals  [ ]      Not making progress toward goals and plan of care will be adjusted       PLAN:  Patient continues to benefit from skilled intervention to address the above impairments. Continue treatment per established plan of care. Discharge Recommendations:  Home Health  Further Equipment Recommendations for Discharge:  Rolling walker is being ordered today       SUBJECTIVE:   Patient c/o increased back pain following stairs. The patient stated 3/3 back precautions however needs reminding to avoid twisting. Reviewed all 3 with patient.       OBJECTIVE DATA SUMMARY:   Critical Behavior:  Neurologic State: Alert  Orientation Level: Oriented X4  Cognition: Appropriate decision making, Appropriate for age attention/concentration, Appropriate safety awareness, Follows commands  Safety/Judgement: Good awareness of safety precautions  Functional Mobility Training:  Bed Mobility:  Log Rolling: Independent  Supine to Sit: Independent  Sit to Supine: Independent  Scooting: Independent   Practiced log roll with bed flat, without rails      Transfers:  Sit to Stand: Modified independent  Stand to Sit: Modified independent                             Balance:  Sitting: Intact  Standing: Impaired, pt reports she continues to feel unsteady at times, continues to use the rolling walker  Standing - Static: Good  Standing - Dynamic : Good  Ambulation/Gait Training:  Distance (ft): 150 Feet (ft)  Assistive Device: Gait belt;Walker, rolling  Ambulation - Level of Assistance: Modified independent        Gait Abnormalities: Antalgic, gait slower following stairs due to increased back pain, requested to return to bed following ambulation                Speed/Zehra: Slow  Step Length: Left shortened;Right shortened              Stairs:  Number of Stairs Trained: 13  Stairs - Level of Assistance: Modified independent  Rail Use: Right      Pain:  Pain Scale 1: Numeric (0 - 10)  Pain Intensity 1: 4 initially, increased to 6 following stairs   Pain Location 1: Back  Pain Orientation 1: Lower; Posterior  Pain Description 1: Aching  Pain Intervention(s) 1: see MAR  Activity Tolerance:   Fair due to moderate back pain      After treatment:   [ ]  Patient left in no apparent distress sitting up in chair  [X]  Patient left in no apparent distress in bed  [X]  Call bell left within reach  [X]  Nursing notified  [ ]  Caregiver present  [ ]  Bed alarm activated      COMMUNICATION/COLLABORATION:   The patients plan of care was discussed with: Registered Nurse     Kemi Rodrigez   Time Calculation: 23 mins

## 2017-09-25 NOTE — PROGRESS NOTES
Patient requesting to discharge this evening. PICC placed, referral updated to [facility] and IV orders sent to [facility] via [employer]. [facility] liason, Harpreet Ortiz, is present to teach patient to self infuse vanc this evening. [facility] agency will follow up tomorrow in home.  [address] [place],[address]

## 2017-09-25 NOTE — PROGRESS NOTES
OPAT  MRSA bacteremia and epidural abscess  BAselineCreatinine 0.74    1) Care of PICC   2) VAncomycin 1500mg IVPB Every 8 hrs until 11/2/17  Adjust dose according to vanco trough between 15-20  3) Draw  Basic metabolic panel and VAnco trough  Every Thursday and Monday  4) CBC/ESR/LFT  every Monday  5) FAx results to Margie Nelson) Call Ulysses Nevins with any critical value- [phone]

## 2017-09-25 NOTE — DISCHARGE INSTRUCTIONS
After Hospital Care Plan:  Discharge Instructions Lumbar Surgery Dr. Kayla Mathews    Patient Name: Leron Merlin    Date of procedure: 9/19/2017      Date of discharge:     Procedure: Procedure(s):  SPINE LUMBAR LAMINECTOMY L3-L4 WITH EVACUATION OF ABSCESS       PCP: Thanh Yanes MD    Follow up appointments  -Follow up with Dr. Kayla Mathews in 2 weeks. Call [phone] to make an appointment as soon as you get home from the hospital.    When to call your Spine Surgeon:  -Signs of infection-if your incision is red; continues to have drainage; drainage has a foul odor or if you have a persistent fever over 101 degrees for 24 hours  -Nausea or vomiting, severe headache  -Loss of bowel or bladder function, inability to urinate  -Changes in sensation in your arms or legs (numbness, tingling, loss of color)  -Increased weakness-greater than before your surgery  -Severe pain or pain not relieved by medications  -Signs of a blood clot in your leg-calf pain, tenderness, redness, swelling of lower leg    When to call your Primary Care Physician:  -Concerns about medical conditions such as diabetes, high blood pressure, asthma, congestive heart failure  -Call if blood sugars are elevated, persistent headache or dizziness, coughing or congestion, constipation or diarrhea, burning with urination, abnormal heart rate    When to call 949 and go to the nearest emergency room:  -Acute onset of chest pain, shortness of breath, difficulty breathing    Activity  - You are going home a well person, be as active as possible. Your only exercise should be walking. Start with short frequent walks and increase your walking distance each day.  -Limit the amount of time you sit to 20-30 minute intervals. Sitting for prolonged periods of time will be uncomfortable for you following surgery.  -Do NOT lift anything over 5 pounds  -Do NOT do any straining, twisting or bending  -When you are in bed, you may lay on your back or on either side. Do NOT lie on your stomach    Diet  -Resume usual diet; drink plenty of fluids; eat foods high in fiber  -It is important to have regular bowel movements. Pain medications may cause constipation. You may want to take a stool softener (such as Senokot-S or Colace) to prevent constipation.  -If constipation occurs, take a laxative (such as Dulcolax tablets, Milk of Magnesia, or a suppository). Laxatives should only be used if the above preventable measures have failed and you still have not had a bowel movement after three days    Driving  -You may not drive or return to work until instructed by your physician. However, you may ride in the car for short periods of time. Incision Care  -You may take brief showers but do not run the water run directly onto the wound. After showering or bathing, remove the wet dressing and gently blot the wound dry with a soft towel.  -Do not rub or apply any lotions or ointments to your incision site.   -Do not soak or scrub your wound  -Keep a dry dressing (ABD and paper tape) on your incision and have it changed daily for 14 days after surgery; more often if your incision is draining. Have your caregiver wash their hands thoroughly before changing your dressing.  -You will have absorbable sutures and steristrips (white tape) on your incision. Leave the steristrips on until they fall off. Showering  -You may shower in approximately 2 days after your surgery.    -Leave the dressing on during your shower. Do NOT allow the water to run directly onto your dressing. Once you get out of the shower, put on a dry dressing.  -Reminder- your brace can be removed while showering. Remember to not bend or twist while your brace is off.    -Do not take a tub bath. Preventing blood clots  -You have been given T.E.D. stockings to wear. Continue to wear these for 7 days after your discharge.   Put them on in the morning and take them off at night.    -They are used to increase your circulation and prevent blood clots from forming in your legs  -T. E.D. stockings can be machine washed, temperature not to exceed 160° F (71°C) and machine dried for 15 to 20 minutes, temperature not to exceed 250° F (121°C). Pain management  -Take pain medication as prescribed; decrease the amount you use as your pain lessens  -Do not wait until you are in extreme pain to take your medication.  -Avoid alcoholic beverages while taking pain medication    Pain Medication Safety  DO:  -Read the Medication Guide   -Take your medicine exactly as prescribed   -Store your medicine away from children and in a safe place   -Flush unused medicine down the toilet   -Call your healthcare provider for medical advice about side effects. You may report side effects to FDA at 4-582-FDA-6847.   -Please be aware that many medications contain Tylenol. We do not want you to over medicate so please read the information below as a guide. Do not take more than 4 Grams of Tylenol in a 24 hour period. (There are 1000 milligrams in one Gram)                                                                                                                                                                                                                                                                                      Percocet contains 325 mg of Tylenol per tablet (do not take more than 12 tablets in 24 hours)  Lortab contains 500 mg of Tylenol per tablet (do not take more than 8 tablets in 24 hours)  Norco contains 325 mg of Tylenol per tablet (do not take more than 12 tablets in 24 hours). DO NOT:  -Do not give your medicine to others   -Do not take medicine unless it was prescribed for you   -Do not stop taking your medicine without talking to your healthcare provider   -Do not break, chew, crush, dissolve, or inject your medicine.  If you cannot  swallow your medicine whole, talk to your healthcare provider.  -Do not drink alcohol while taking this medicine  -Do not take anti-inflammatory medications or aspirin unless instructed by your  Physician. Discharge Instructions       PATIENT ID: Romina Angulo  MRN: [id number]   YOB: 1985    DATE OF ADMISSION: 9/18/2017  1:45 AM    DATE OF DISCHARGE: 9/25/2017    PRIMARY CARE PROVIDER: Uri Vaughan MD     ATTENDING PHYSICIAN: Velia Kirk MD  DISCHARGING PROVIDER: Velia Kirk MD    To contact this individual call [phone] and ask the  to page. If unavailable ask to be transferred the Adult Hospitalist Department.     DISCHARGE DIAGNOSES Epidural abscess   Sepsis    CONSULTATIONS: IP CONSULT TO INFECTIOUS DISEASES  IP CONSULT TO NEUROSURGERY  IP CONSULT TO CARDIOLOGY    PROCEDURES/SURGERIES: Procedure(s):  SPINE LUMBAR LAMINECTOMY L3-L4 WITH EVACUATION OF ABSCESS     PENDING TEST RESULTS:   At the time of discharge the following test results are still pending: none    FOLLOW UP APPOINTMENTS:   Follow-up Information     Follow up With Details Comments Contact Info    Uri Vaughan MD   [place] [id number]  [phone]      [facility], [address] [facility] [address]  [facility] [address] [id number]  [phone]      Nadir Lugo MD Schedule an appointment as soon as possible for a visit in 1 week post-op appointment, wound re-check, suture removal [address] [facility] [address] [facility]      Carlos Samuels MD In 2 weeks  [address] [employer] [address] [place] [address],[facility] Floor  [phone]             ADDITIONAL CARE RECOMMENDATIONS:   1) Care of PICC   2) VAncomycin 1500mg IVPB Every 8 hrs until 11/2/17  Adjust dose according to vanco trough between 15-20  3) Draw  Basic metabolic panel and VAnco trough  Every Thursday and Monday  4) CBC/ESR/LFT  every Monday  5) FAx results to Rosalie Eaton [phone]) Call Rosalie Eaton with any critical value- [phone]      Follow up with  Manny in 2 weeks  Follow up with Dr Travis Suero as above    DIET: Cardiac Diet     ACTIVITY: Activity as tolerated      DISCHARGE MEDICATIONS:   See Medication Reconciliation Form    · It is important that you take the medication exactly as they are prescribed. · Keep your medication in the bottles provided by the pharmacist and keep a list of the medication names, dosages, and times to be taken in your wallet. · Do not take other medications without consulting your doctor. NOTIFY YOUR PHYSICIAN FOR ANY OF THE FOLLOWING:   Fever over 101 degrees for 24 hours. Chest pain, shortness of breath, fever, chills, nausea, vomiting, diarrhea, change in mentation, falling, weakness, bleeding. Severe pain or pain not relieved by medications. Or, any other signs or symptoms that you may have questions about.       DISPOSITION:  x  Home With:   OT  PT  HH  RN       SNF/Inpatient Rehab/LTAC    Independent/assisted living    Hospice    Other:     CDMP Checked:   Yes x     PROBLEM LIST Updated:  Yes x       Signed:   Payton Yeh MD  9/25/2017  3:57 PM

## 2017-09-25 NOTE — PROGRESS NOTES
Neurosurgery Progress Note  Aqu[place] Hughes, [place]  [phone]        Admit Date: 2017   LOS: 7 days        Daily Progress Note: 2017    POD: Postoperative day #6    S/P: Procedure(s):  SPINE LUMBAR LAMINECTOMY L3-L4 WITH EVACUATION OF ABSCESS     Subjective: The patient developed severe lower back pain and sciatic pain over a week ago. She denies accident or injury to her back. She went to an urgent care clinic and was given steroids/Toradol without relief. She developed a fever and chills with worsening pain, so she presented to the ER. She also had increasing cervical and thoracic pain. Imaging of her spine revealed a lumbar epidural abscess. She does have a history of lupus. She takes Plaquenil on a regular basis. She states she does not normally take prednisone. She was placed on this last week by urgent care for her sciatic pain. She is now postop day #6 from evacuation of lumbar epidural abscess. Right leg symptoms greatly improved. She still describes back pain exacerbated with movement. Strength 5/5 in lower extremities. No drainage from incision or significant surrounding erythema or edema. MRSA isolated from intraoperative cultures. The MRI scans of her cervical spine and thoracic spine without evidence of epidural abscess. On vanc per ID. PICC Placement once 2 blood cx negative    Surgical sutures to come out in 2 week follow-up with Dr. Thanh Myers    Denies  chest pain, nausea, vomiting, difficulty swallowing, headache, and dyspnea.        Objective:     Vital signs  Temp (24hrs), Av.5 °F (36.9 °C), Min:97.8 °F (36.6 °C), Max:99.3 °F (37.4 °C)           Visit Vitals    BP (!) 120/95 (BP 1 Location: Left arm, BP Patient Position: At rest)    Pulse 98    Temp 97.8 °F (36.6 °C)    Resp 16    Ht 5' 6\" (1.676 m)    Wt 96.2 kg (212 lb)    SpO2 99%    BMI 34.22 kg/m2    O2 Flow Rate (L/min): 3 l/min O2 Device: Nasal cannula     Pain control  Pain Assessment  Pain Scale 1: Numeric (0 - 10)  Pain Intensity 1: 4  Pain Onset 1: postop  Pain Location 1: Back  Pain Orientation 1: Lower, Posterior  Pain Description 1: Aching  Pain Intervention(s) 1: Distraction, Rest (recently given pain med)    PT/OT  Gait     Gait  Speed/Zehra: Pace decreased (<100 feet/min)  Step Length: Left shortened, Right shortened  Gait Abnormalities: Antalgic, Decreased step clearance (rest break x 1)  Ambulation - Level of Assistance: Supervision  Distance (ft): 160 Feet (ft)  Assistive Device: Gait belt, Walker, rolling           Physical Exam:  Gen:NAD. Neuro: A&Ox3. Follows commands. Speech clear. Affect normal.  KISER. Strength 5/5 in UE and LE BL. Gait deferred. Skin: Lumbar dressing C/D/I. HVAC with serosanguineous drainage    MRI L-spine with and without contrast on 09/18/17 shows Extensive epidural abscess from the L2 level to the mid L5 in the posterior right spinal canal, likely originating from inflammatory changes in the right  L5-S1 facet joint. At least moderate thecal sac stenosis due to the collection. Soft tissue edema and small fluid collection abutting the posterior aspect of the right L5-S1 facet joint. Probable small right psoas abscess. 24 hour results:    No results found for this or any previous visit (from the past 24 hour(s)). Assessment:     Principal Problem:    SIRS due to infectious process without acute organ dysfunction ([facility] [address] (9/18/2017)    Active Problems:    Hypokalemia (9/18/2017)      Intractable back pain (9/18/2017)      Obesity (BMI 30-39.9) (9/18/2017)      Bacteriuria (9/18/2017)        Plan:   1. Lumbar epidural abscess   - s/p L3-4 laminectomy with evacuation of epidural abscess 09/19   - Pain control with Percocet PRN, Flexeril PRN. sched gabapentin   - HVAC out 9/21   - PT/OT evals   - ID following. Nafcillin stopped.  Cont Vancomycin   - Pt will need PICC placement once blood cultures remain negative   - Contact precautions for MRSA (Bactermia and isolated from intraoperative wound cx)   - Cont bowel regimen   - Sutures to be removed in 2 week follow up with Dr. Nam White    2. MRSA bacteremia   - ID following   - TTE and JOSE negative   - Contact precautions   - awaiting PICC placement once blood cultures negative, likely today    3. SIRS ->resolved   - secondary to #1 and #2    - Hospitalist and ID following    4. Obesity   - BMI 34   - Counseling when appropraite    5. Lupus   - ID recommends discontinuing plaquenil for now. - Her rheumatologist is Mackenzie Levi NP with [facility]   - Prednisone discontinued    6. Hypokalemia   - Potassium repleted   - Hospitalist following    7. Leukocytosis   - due to #1 and #2   - now within normal limits    8. Anxiety   - Cont lexapro, wellbutrin    Activity: up with assist  DVT ppx: SCDs  Dispo: tbd    Plan d/w Dr. Nam White, nurse.        Shayan Zamora NP

## 2017-09-25 NOTE — PROGRESS NOTES
Pharmacist Note - Vancomycin Dosing  Therapy day 8  Indication: MRSA bacteremia and epidural abscess  Current regimen: 1500mg IV [id number]    A Trough Level resulted at 14.7 mcg/mL which was obtained ~10 hrs post-dose. The extrapolated \"true\" trough is approximately 18.5 mcg/mL based on the patient's known kinetics. Goal trough: 15 - 20 mcg/mL     Plan: Continue current regimen. Pharmacy will continue to monitor this patient daily for changes in clinical status and renal function.

## 2017-09-25 NOTE — PROGRESS NOTES
NUTRITION- DIETETIC tECHnICIAN    Pt seen for:       [x]                  Rescreen  []                  Food preferences/tolerances  []                  Food Allergies  []                  PO intake check  []                  Supplements  []                  Diet order clarification  []                  Education  []                  Other     Rescreen:    [x]                  Not at Nutrition Risk, rescreen per screening protocol  []                  At Nutrition Risk- RD referral         SUBJECTIVE/OBJECTIVE:     Information obtained from:  patient      Diet:  Regular    Intake: excellent    No data found. Weight Changes:       Wt Readings from Last 3 Encounters:   09/19/17 96.2 kg (212 lb)   09/18/17 96.2 kg (212 lb)   09/09/17 97.1 kg (214 lb)       Problems Identified:      [x]                  No po intake recorded x 7 days but patient reports good appetite and intake   []                  Specified food preferences   []                  Dislikes supplements              []                  Allergies:   []                  Difficulty chewing      []                  Dentition    []                  Nausea/Vomiting   []                  Constipation   []                  Diarrhea    PLAN:     [x]                   Continue current diet and encourage intake  []                   Obtained/adjusted food preferences/tolerances and/or snacks options   []                   Dislikes supplements will try a substitution  []                   Modify diet for food allergies  []                   Adjust texture due to difficulty chewing   []                   Educated patient  []                   RD Referral  [x]                   Rescreen per screening protocol          Ciaran Gonzalez DTR

## 2017-09-25 NOTE — PROCEDURES
PICC Placement Note    PRE-PROCEDURE VERIFICATION  Correct Procedure: yes  Correct Site:  yes  Temperature: Temp: 97.8 °F (36.6 °C), Temperature Source: Temp Source: Oral  Recent Labs      09/23/17   2349   BUN  13   CREA  0.74   PLT  364   WBC  9.4       Allergies: Iodine    Education materials, including PICC Booklet and written information regarding central catheter related bloodstream infection and prevention given to patient. See Patient Education activity for further details. PROCEDURE DETAIL  A single lumen power injectable PICC line was placed for Home IV Antibiotic Therapy. The following documentation is in addition to the PICC properties in the lines/airways flowsheet :  Lot #: [id number]  Xylocaine 1% used intradermally:  yes  Total Catheter Length: 37 (cm)  Internal Catheter Length: 37 (cm)  Vein Selection for PICC: right basilic  Central Line Bundle followed: yes  Complication Related to Insertion: none    The placement was verified by ECG technology. The PICC is on the right side and the tip overlies the superior vena cava. ECG verification documentation is on the patient's paper chart. Line is ready for immediate use. Report to Michael Chavira.     Sallie Bales, MARLENN, RN, VA-BC   Vascular Access Team

## 2017-09-25 NOTE — DISCHARGE SUMMARY
Discharge Summary       PATIENT ID: Jared Bernard  MRN: [id number]   YOB: 1985    DATE OF ADMISSION: 9/18/2017  1:45 AM    DATE OF DISCHARGE: 9/25/2017   PRIMARY CARE PROVIDER: Shani Araiza MD     ATTENDING PHYSICIAN: Dr Candace Berkowitz  DISCHARGING PROVIDER: Candace Berkowitz MD    To contact this individual call [phone] and ask the  to page. If unavailable ask to be transferred the Adult Hospitalist Department. CONSULTATIONS: IP CONSULT TO INFECTIOUS DISEASES  IP CONSULT TO NEUROSURGERY  IP CONSULT TO CARDIOLOGY    PROCEDURES/SURGERIES: Procedure(s):  SPINE LUMBAR LAMINECTOMY L3-L4 WITH EVACUATION OF ABSCESS     ADMITTING DIAGNOSES & HOSPITAL COURSE:   Lumbar epidural abscess  - CT abd/pelvis 9/18 nonobstructing L kidney stone otherwise no acute  - MRI lumbar 9/18 Extensive epidural abscess from the L2 level to the mid L5 in the posterior  right spinal canal, likely originating from inflammatory changes in the right L5-S1 facet joint. At least moderate thecal sac stenosis due to the collection. Soft tissue edema and small fluid collection abutting the posterior aspect of the right L5-S1 facet joint.   Probable small right psoas abscess  -MRI thoracic and cervical spine unremarkable  - BCx 9/18 suggestive of MRSA  -Repeat blood culture 9/19 MRSA  -Blood culture 9/21, 9/22 negative so far  -Wound culture MRSA  - on vanc/Nafcillin started 9/18, Nafcillin discontinued by ID  - s/p SPINE LUMBAR LAMINECTOMY L3-L4 WITH EVACUATION OF ABSCESS 9/19  -Spoke to ID, will get PICC today, 9/25 and discharge today with IV antibiotics  -Appreciate ID/Neurosurgery    Sepsis (POA)  -sec to above  -Echo unremarkable  -JOSE done 9/22, within normal limit, no vegetations    Back pain with lumbar radiculopathy and recent dx sciatica (POA)  - pain control with scheduled TID ibuprofen, lidocaine patch, gabapentin; prn Percocet  - [facility] 9[facility] and last had Percocet 5/325 #20 ordered on 8/29/17 by PCP  - dx sciatica recently at [facility], sent home with prednisone but no improvement  - PT/OT evals  -Prednisone discontinued    Acute blood loss anemia  -from surgery  -Monitor h/h for now, no need for transfusion for now    Lupus   - Plaquenil held till infection resolved  - Rheum is Dr Alla Bazan at [facility]    Chest pain  -likely panic attack  -The patient claims that she is upset because of what her ex  said  -Will give her Xanax and if feels better, no further work up    Obesity, Body mass index is 34.22 kg/(m^2). Hypokalemia (POA) - replete prn    Regular diet    PT/OT home health     Code status: full  DVT prophylaxis: SCDs  PTA: home        DISCHARGE DIAGNOSES / PLAN:      1.   MRSA epidural abscess with MRSA sepsis       PENDING TEST RESULTS:   At the time of discharge the following test results are still pending: none      FOLLOW UP APPOINTMENTS:    Follow-up Information     Follow up With Details Comments Contact Info    Zenno Green MD   [facility] [address] [facility] [place]   [facility] [address]  [facility] [address] [phone]  [phone]      Lyle Chin MD Schedule an appointment as soon as possible for a visit in 1 week post-op appointment, wound re-check, suture removal [address]      Idalia Ramesh MD In 2 weeks  [address]  [phone]             ADDITIONAL CARE RECOMMENDATIONS:   Follow up with Dr Jeffry Gonzalez for suture removal  Follow up with Dr Deandre Ludwig in 2 weeks    1) Care of PICC   2) VAncomycin 1500mg IVPB Every 8 hrs until 11/2/17  Adjust dose according to vanco trough between 15-20  3) Draw  Basic metabolic panel and VAnco trough  Every Thursday and Monday  4) CBC/ESR/LFT  every Monday  5) FAx results to Nicholas Diez) Call [facility] with any critical value- [phone]    DIET: Cardiac Diet    ACTIVITY: Activity as tolerated      DISCHARGE MEDICATIONS:  Current Discharge Medication List      START taking these medications    Details   cyclobenzaprine (FLEXERIL) 10 mg tablet Take 1 Tab by mouth three (3) times daily as needed for Muscle Spasm(s). Qty: 30 Tab, Refills: 0      gabapentin (NEURONTIN) 100 mg capsule Take 2 Caps by mouth three (3) times daily. Qty: 180 Cap, Refills: 1      L. acidoph & paracasei- S therm- Bifido (ELLEN-Q/RISAQUAD) 8 billion cell cap cap Take 1 Cap by mouth daily. Qty: 30 Cap, Refills: 1      senna-docusate (PERICOLACE) 8.6-50 mg per tablet Take 1 Tab by mouth daily as needed for Constipation. Qty: 30 Tab, Refills: 0         CONTINUE these medications which have CHANGED    Details   oxyCODONE-acetaminophen (PERCOCET) 5-325 mg per tablet Take 1 Tab by mouth every four (4) hours as needed (migraine). Max Daily Amount: 6 Tabs. Qty: 20 Tab, Refills: 0         CONTINUE these medications which have NOT CHANGED    Details   escitalopram oxalate (LEXAPRO) 10 mg tablet Take 10 mg by mouth daily. buPROPion SR (WELLBUTRIN SR) 150 mg SR tablet Take 150 mg by mouth two (2) times a day. topiramate (TOPAMAX) 50 mg tablet Take 50 mg by mouth two (2) times a day. STOP taking these medications       PREDNISONE PO Comments:   Reason for Stopping:         predniSONE (DELTASONE) 20 mg tablet Comments:   Reason for Stopping:         hydroxychloroquine (PLAQUENIL) 200 mg tablet Comments:   Reason for Stopping:         BUPROPION HCL (WELLBUTRIN PO) Comments:   Reason for Stopping:                 NOTIFY YOUR PHYSICIAN FOR ANY OF THE FOLLOWING:   Fever over 101 degrees for 24 hours. Chest pain, shortness of breath, fever, chills, nausea, vomiting, diarrhea, change in mentation, falling, weakness, bleeding. Severe pain or pain not relieved by medications. Or, any other signs or symptoms that you may have questions about.     DISPOSITION:  x  Home With:   OT  PT Terry Garvey  RN       Long term SNF/Inpatient Rehab    Independent/assisted living    Hospice    Other:       PATIENT CONDITION AT DISCHARGE:     Functional status    Poor     Deconditioned    x Independent      Cognition   x  Lucid     Forgetful     Dementia      Catheters/lines (plus indication)    Nino     PICC     PEG    x None      Code status   x  Full code     DNR      PHYSICAL EXAMINATION AT DISCHARGE:  Please see progress note      CHRONIC MEDICAL DIAGNOSES:  Problem List as of 9/25/2017  Date Reviewed: 9/18/2017          Codes Class Noted - Resolved    Hypokalemia ICD-10-CM: E87.6  ICD-9-CM: 276.8  9/18/2017 - Present        Intractable back pain ICD-10-CM: M54.9  ICD-9-CM: 724.5  9/18/2017 - Present        * (Principal)SIRS due to infectious process without acute organ dysfunction ([facility] ICD-10-CM: A41.9  ICD-9-CM: 038.9, 995.91  9/18/2017 - Present        Obesity (BMI 30-39.9) (Chronic) ICD-10-CM: E66.9  ICD-9-CM: 278.00  9/18/2017 - Present        Bacteriuria ICD-10-CM: R82.71  ICD-9-CM: 791.9  9/18/2017 - Present        PVC's (premature ventricular contractions) ICD-10-CM: I49.3  ICD-9-CM: 427.69  5/15/2015 - Present        Palpitations ICD-10-CM: R00.2  ICD-9-CM: 785.1  5/15/2015 - Present        Lupus ([facility] ICD-10-CM: M32.9  ICD-9-CM: 710.0  5/15/2015 - Present              Greater than 37 minutes were spent with the patient on counseling and coordination of care    Signed:   Brisa Kirby MD  9/25/2017  3:53 PM

## 2017-09-26 ENCOUNTER — HOME CARE VISIT (OUTPATIENT)
Dept: SCHEDULING | Facility: HOME HEALTH | Age: 32
End: 2017-09-26
Payer: COMMERCIAL

## 2017-09-26 VITALS
SYSTOLIC BLOOD PRESSURE: 126 MMHG | DIASTOLIC BLOOD PRESSURE: 64 MMHG | TEMPERATURE: 98.5 F | HEART RATE: 74 BPM | OXYGEN SATURATION: 98 % | RESPIRATION RATE: 18 BRPM

## 2017-09-26 LAB
BACTERIA SPEC CULT: NORMAL
SERVICE CMNT-IMP: NORMAL

## 2017-09-26 PROCEDURE — G0299 HHS/HOSPICE OF RN EA 15 MIN: HCPCS

## 2017-09-26 PROCEDURE — G0151 HHCP-SERV OF PT,EA 15 MIN: HCPCS

## 2017-09-26 NOTE — PROGRESS NOTES
Removed IV. Scripts given. Reviewed discharge instructions and follow up information. Opportunities for questions given. Helped pt pack up belongings. Pt discharged for home in the care of her friend with all belongings.

## 2017-09-27 VITALS
OXYGEN SATURATION: 97 % | RESPIRATION RATE: 16 BRPM | DIASTOLIC BLOOD PRESSURE: 90 MMHG | HEART RATE: 103 BPM | SYSTOLIC BLOOD PRESSURE: 128 MMHG | TEMPERATURE: 98 F

## 2017-09-27 LAB
BACTERIA SPEC CULT: NORMAL
SERVICE CMNT-IMP: NORMAL

## 2017-09-28 ENCOUNTER — HOME CARE VISIT (OUTPATIENT)
Dept: SCHEDULING | Facility: HOME HEALTH | Age: 32
End: 2017-09-28
Payer: COMMERCIAL

## 2017-09-28 VITALS
RESPIRATION RATE: 18 BRPM | DIASTOLIC BLOOD PRESSURE: 80 MMHG | TEMPERATURE: 98.9 F | OXYGEN SATURATION: 99 % | HEART RATE: 97 BPM | SYSTOLIC BLOOD PRESSURE: 118 MMHG

## 2017-09-28 PROCEDURE — G0152 HHCP-SERV OF OT,EA 15 MIN: HCPCS

## 2017-09-28 PROCEDURE — G0300 HHS/HOSPICE OF LPN EA 15 MIN: HCPCS

## 2017-09-29 LAB
BACTERIA SPEC CULT: NORMAL
SERVICE CMNT-IMP: NORMAL

## 2017-10-02 ENCOUNTER — HOME CARE VISIT (OUTPATIENT)
Dept: SCHEDULING | Facility: HOME HEALTH | Age: 32
End: 2017-10-02
Payer: COMMERCIAL

## 2017-10-02 VITALS
TEMPERATURE: 97.9 F | SYSTOLIC BLOOD PRESSURE: 130 MMHG | DIASTOLIC BLOOD PRESSURE: 86 MMHG | RESPIRATION RATE: 18 BRPM | OXYGEN SATURATION: 98 % | HEART RATE: 101 BPM

## 2017-10-02 PROCEDURE — G0299 HHS/HOSPICE OF RN EA 15 MIN: HCPCS

## 2017-10-03 ENCOUNTER — HOME CARE VISIT (OUTPATIENT)
Dept: SCHEDULING | Facility: HOME HEALTH | Age: 32
End: 2017-10-03
Payer: COMMERCIAL

## 2017-10-03 PROCEDURE — G0151 HHCP-SERV OF PT,EA 15 MIN: HCPCS

## 2017-10-04 VITALS
HEART RATE: 76 BPM | DIASTOLIC BLOOD PRESSURE: 80 MMHG | OXYGEN SATURATION: 97 % | TEMPERATURE: 97.5 F | RESPIRATION RATE: 16 BRPM | SYSTOLIC BLOOD PRESSURE: 138 MMHG

## 2017-10-05 ENCOUNTER — HOME CARE VISIT (OUTPATIENT)
Dept: SCHEDULING | Facility: HOME HEALTH | Age: 32
End: 2017-10-05
Payer: COMMERCIAL

## 2017-10-05 VITALS
SYSTOLIC BLOOD PRESSURE: 118 MMHG | TEMPERATURE: 98.3 F | HEART RATE: 101 BPM | OXYGEN SATURATION: 98 % | DIASTOLIC BLOOD PRESSURE: 90 MMHG | RESPIRATION RATE: 18 BRPM

## 2017-10-05 PROCEDURE — G0299 HHS/HOSPICE OF RN EA 15 MIN: HCPCS

## 2017-10-06 ENCOUNTER — HOME CARE VISIT (OUTPATIENT)
Dept: SCHEDULING | Facility: HOME HEALTH | Age: 32
End: 2017-10-06
Payer: COMMERCIAL

## 2017-10-06 PROCEDURE — G0152 HHCP-SERV OF OT,EA 15 MIN: HCPCS

## 2017-10-07 ENCOUNTER — HOME CARE VISIT (OUTPATIENT)
Dept: SCHEDULING | Facility: HOME HEALTH | Age: 32
End: 2017-10-07
Payer: COMMERCIAL

## 2017-10-07 ENCOUNTER — HOSPITAL ENCOUNTER (EMERGENCY)
Age: 32
Discharge: HOME | End: 2017-10-07
Attending: EMERGENCY MEDICINE | Admitting: EMERGENCY MEDICINE
Payer: COMMERCIAL

## 2017-10-07 VITALS
SYSTOLIC BLOOD PRESSURE: 120 MMHG | DIASTOLIC BLOOD PRESSURE: 70 MMHG | HEART RATE: 115 BPM | TEMPERATURE: 99.3 F | OXYGEN SATURATION: 98 %

## 2017-10-07 VITALS
BODY MASS INDEX: 34.07 KG/M2 | SYSTOLIC BLOOD PRESSURE: 130 MMHG | OXYGEN SATURATION: 97 % | DIASTOLIC BLOOD PRESSURE: 84 MMHG | WEIGHT: 212 LBS | HEART RATE: 104 BPM | RESPIRATION RATE: 18 BRPM | TEMPERATURE: 98.5 F | HEIGHT: 66 IN

## 2017-10-07 DIAGNOSIS — R53.1 WEAKNESS: Primary | ICD-10-CM

## 2017-10-07 DIAGNOSIS — T82.898A OCCLUDED PICC LINE, INITIAL ENCOUNTER (HCC): ICD-10-CM

## 2017-10-07 LAB
ALBUMIN SERPL-MCNC: 3.3 G/DL (ref 3.5–5)
ALBUMIN/GLOB SERPL: 0.8 {RATIO} (ref 1.1–2.2)
ALP SERPL-CCNC: 112 U/L (ref 45–117)
ALT SERPL-CCNC: 42 U/L (ref 12–78)
ANION GAP SERPL CALC-SCNC: 9 MMOL/L (ref 5–15)
AST SERPL-CCNC: 15 U/L (ref 15–37)
BASOPHILS # BLD: 0.1 K/UL (ref 0–0.1)
BASOPHILS NFR BLD: 1 % (ref 0–1)
BILIRUB SERPL-MCNC: 0.4 MG/DL (ref 0.2–1)
BUN SERPL-MCNC: 10 MG/DL (ref 6–20)
BUN/CREAT SERPL: 12 (ref 12–20)
CALCIUM SERPL-MCNC: 8.7 MG/DL (ref 8.5–10.1)
CHLORIDE SERPL-SCNC: 107 MMOL/L (ref 97–108)
CO2 SERPL-SCNC: 24 MMOL/L (ref 21–32)
CREAT SERPL-MCNC: 0.86 MG/DL (ref 0.55–1.02)
EOSINOPHIL # BLD: 0.3 K/UL (ref 0–0.4)
EOSINOPHIL NFR BLD: 5 % (ref 0–7)
ERYTHROCYTE [DISTWIDTH] IN BLOOD BY AUTOMATED COUNT: 13 % (ref 11.5–14.5)
GLOBULIN SER CALC-MCNC: 4.2 G/DL (ref 2–4)
GLUCOSE SERPL-MCNC: 111 MG/DL (ref 65–100)
HCT VFR BLD AUTO: 35 % (ref 35–47)
HGB BLD-MCNC: 11.8 G/DL (ref 11.5–16)
LYMPHOCYTES # BLD: 1.4 K/UL (ref 0.8–3.5)
LYMPHOCYTES NFR BLD: 24 % (ref 12–49)
MCH RBC QN AUTO: 31.1 PG (ref 26–34)
MCHC RBC AUTO-ENTMCNC: 33.7 G/DL (ref 30–36.5)
MCV RBC AUTO: 92.3 FL (ref 80–99)
MONOCYTES # BLD: 0.3 K/UL (ref 0–1)
MONOCYTES NFR BLD: 5 % (ref 5–13)
NEUTS SEG # BLD: 4 K/UL (ref 1.8–8)
NEUTS SEG NFR BLD: 65 % (ref 32–75)
PLATELET # BLD AUTO: 309 K/UL (ref 150–400)
POTASSIUM SERPL-SCNC: 3.4 MMOL/L (ref 3.5–5.1)
PROT SERPL-MCNC: 7.5 G/DL (ref 6.4–8.2)
RBC # BLD AUTO: 3.79 M/UL (ref 3.8–5.2)
SODIUM SERPL-SCNC: 140 MMOL/L (ref 136–145)
WBC # BLD AUTO: 6.1 K/UL (ref 3.6–11)

## 2017-10-07 PROCEDURE — 85025 COMPLETE CBC W/AUTO DIFF WBC: CPT | Performed by: EMERGENCY MEDICINE

## 2017-10-07 PROCEDURE — 74011250637 HC RX REV CODE- 250/637: Performed by: EMERGENCY MEDICINE

## 2017-10-07 PROCEDURE — 36415 COLL VENOUS BLD VENIPUNCTURE: CPT | Performed by: EMERGENCY MEDICINE

## 2017-10-07 PROCEDURE — 99283 EMERGENCY DEPT VISIT LOW MDM: CPT

## 2017-10-07 PROCEDURE — 93005 ELECTROCARDIOGRAM TRACING: CPT

## 2017-10-07 PROCEDURE — G0300 HHS/HOSPICE OF LPN EA 15 MIN: HCPCS

## 2017-10-07 PROCEDURE — 80053 COMPREHEN METABOLIC PANEL: CPT | Performed by: EMERGENCY MEDICINE

## 2017-10-07 RX ORDER — CYCLOBENZAPRINE HCL 10 MG
10 TABLET ORAL
Status: COMPLETED | OUTPATIENT
Start: 2017-10-07 | End: 2017-10-07

## 2017-10-07 RX ORDER — GABAPENTIN 100 MG/1
100 CAPSULE ORAL
Status: COMPLETED | OUTPATIENT
Start: 2017-10-07 | End: 2017-10-07

## 2017-10-07 RX ADMIN — CYCLOBENZAPRINE HYDROCHLORIDE 10 MG: 10 TABLET, FILM COATED ORAL at 18:24

## 2017-10-07 RX ADMIN — GABAPENTIN 100 MG: 100 CAPSULE ORAL at 18:24

## 2017-10-07 NOTE — DISCHARGE INSTRUCTIONS
We hope that we have addressed all of your medical concerns. The examination and treatment you received in the Emergency Department were for an emergent problem and were not intended as complete care. It is important that you follow up with your healthcare provider(s) for ongoing care. If your symptoms worsen or do not improve as expected, and you are unable to reach your usual health care provider(s), you should return to the Emergency Department. Today's healthcare is undergoing tremendous change, and patient satisfaction surveys are one of the many tools to assess the quality of medical care. You may receive a survey from the [employer] regarding your experience in the Emergency Department. I hope that your experience has been completely positive, particularly the medical care that I provided. As such, please participate in the survey; anything less than excellent does not meet my expectations or intentions. [address] [place] [address] and [address] [place] [address] participate in nationally recognized quality of care measures. If your blood pressure is greater than 120/80, as reported below, we urge that you seek medical care to address the potential of high blood pressure, commonly known as hypertension. Hypertension can be hereditary or can be caused by certain medical conditions, pain, stress, or \"white coat syndrome. \"       Please make an appointment with your health care provider(s) for follow up of your Emergency Department visit. VITALS:   Patient Vitals for the past 8 hrs:   Temp Pulse Resp BP SpO2   10/07/17 1502 98.7 °F (37.1 °C) (!) 123 18 114/80 98 %          Thank you for allowing us to provide you with medical care today. We realize that you have many choices for your emergency care needs. Please choose us in the future for any continued health care needs. Allyson Shingles Karyle Jakes, MD    [facility]. Office: [phone]            Recent Results (from the past 24 hour(s))   EKG, 12 LEAD, INITIAL    Collection Time: 10/07/17  3:45 PM   Result Value Ref Range    Ventricular Rate 105 BPM    Atrial Rate 105 BPM    P-R Interval 156 ms    QRS Duration 92 ms    Q-T Interval 342 ms    QTC Calculation (Bezet) 452 ms    Calculated P Axis 42 degrees    Calculated R Axis 31 degrees    Calculated T Axis 35 degrees    Diagnosis       Sinus tachycardia  When compared with ECG of 08-APR-2016 13:54,  No significant change was found     METABOLIC PANEL, COMPREHENSIVE    Collection Time: 10/07/17  3:50 PM   Result Value Ref Range    Sodium 140 136 - 145 mmol/L    Potassium 3.4 (L) 3.5 - 5.1 mmol/L    Chloride 107 97 - 108 mmol/L    CO2 24 21 - 32 mmol/L    Anion gap 9 5 - 15 mmol/L    Glucose 111 (H) 65 - 100 mg/dL    BUN 10 6 - 20 MG/DL    Creatinine 0.86 0.55 - 1.02 MG/DL    BUN/Creatinine ratio 12 12 - 20      GFR est AA >60 >60 ml/min/1.73m2    GFR est non-AA >60 >60 ml/min/1.73m2    Calcium 8.7 8.5 - 10.1 MG/DL    Bilirubin, total 0.4 0.2 - 1.0 MG/DL    ALT (SGPT) 42 12 - 78 U/L    AST (SGOT) 15 15 - 37 U/L    Alk. phosphatase 112 45 - 117 U/L    Protein, total 7.5 6.4 - 8.2 g/dL    Albumin 3.3 (L) 3.5 - 5.0 g/dL    Globulin 4.2 (H) 2.0 - 4.0 g/dL    A-G Ratio 0.8 (L) 1.1 - 2.2     CBC WITH AUTOMATED DIFF    Collection Time: 10/07/17  3:50 PM   Result Value Ref Range    WBC 6.1 3.6 - 11.0 K/uL    RBC 3.79 (L) 3.80 - 5.20 M/uL    HGB 11.8 11.5 - 16.0 g/dL    HCT 35.0 35.0 - 47.0 %    MCV 92.3 80.0 - 99.0 FL    MCH 31.1 26.0 - 34.0 PG    MCHC 33.7 30.0 - 36.5 g/dL    RDW 13.0 11.5 - 14.5 %    PLATELET 611 548 - 466 K/uL    NEUTROPHILS 65 32 - 75 %    LYMPHOCYTES 24 12 - 49 %    MONOCYTES 5 5 - 13 %    EOSINOPHILS 5 0 - 7 %    BASOPHILS 1 0 - 1 %    ABS. NEUTROPHILS 4.0 1.8 - 8.0 K/UL    ABS. LYMPHOCYTES 1.4 0.8 - 3.5 K/UL    ABS. MONOCYTES 0.3 0.0 - 1.0 K/UL    ABS. EOSINOPHILS 0.3 0.0 - 0.4 K/UL    ABS.  BASOPHILS 0.1 0.0 - 0.1 K/UL       No results found. Weakness: Care Instructions  Your Care Instructions  Weakness is a lack of physical or muscle strength. You may feel that you need to make extra effort to move your arms, legs, or other muscles. Generalized weakness means that you feel weak in most areas of your body. Another type of weakness may affect just one muscle or group of muscles. You may feel weak and tired after you have done too much activity, such as taking an extra-long hike. This is not a serious problem. It often goes away on its own. Feeling weak can also be caused by medical conditions like thyroid problems, depression, or a virus. Sometimes the cause can be serious. Your doctor may want to do more tests to try to find the cause of the weakness. The doctor has checked you carefully, but problems can develop later. If you notice any problems or new symptoms, get medical treatment right away. Follow-up care is a key part of your treatment and safety. Be sure to make and go to all appointments, and call your doctor if you are having problems. It's also a good idea to know your test results and keep a list of the medicines you take. How can you care for yourself at home? · Rest when you feel tired. · Be safe with medicines. If your doctor prescribed medicine, take it exactly as prescribed. Call your doctor if you think you are having a problem with your medicine. You will get more details on the specific medicines your doctor prescribes. · Do not skip meals. Eating a balanced diet may increase your energy level. · Get some physical activity every day, but do not get too tired. When should you call for help? Call your doctor now or seek immediate medical care if:  · You have new or worse weakness. · You are dizzy or lightheaded, or you feel like you may faint. Watch closely for changes in your health, and be sure to contact your doctor if:  · You do not get better as expected.   Where can you learn more?  Go to http://quincy-quynh.info/. Enter [id number] in the search box to learn more about \"Weakness: Care Instructions. \"  Current as of: March 20, 2017  Content Version: 11.3  © 5468-7689 [employer]. Care instructions adapted under license by [employer] (which disclaims liability or warranty for this information). If you have questions about a medical condition or this instruction, always ask your healthcare professional. [address] any warranty or liability for your use of this information. Peripherally Inserted Central Catheter (PICC): Care Instructions  Your Care Instructions  A peripherally inserted central catheter (PICC) is a soft, flexible tube that runs under your skin from a vein in your arm to a large vein near your heart. One end of the catheter stays outside your body. It is a type of central venous catheter, or central venous line. You may have it for weeks or months. A PICC is used to give you medicine, blood products, nutrients, or fluids. A PICC makes doing these things more comfortable for you because they are put directly into the catheter. So you will not be stuck with a needle every time. A PICC also can be used to draw blood for tests. The end of the PICC sometimes has two or three openings so that you can get more than one type of fluid or medicine at a time. Your doctor may give you medicine to make you feel relaxed. You may feel a little pain when your doctor numbs your arm. Your doctor will then thread the catheter up a vein in your arm to a larger vein. You will not feel any pain. The doctor may use stitches or other devices to hold the catheter in place where it exits your arm. After the procedure, the site may be sore for a day or two. Follow-up care is a key part of your treatment and safety. Be sure to make and go to all appointments, and call your doctor if you are having problems.  It's also a good idea to know your test results and keep a list of the medicines you take. How can you care for yourself at home? · Do not wear jewelry, such as necklaces, that can catch on the catheter. · If the catheter breaks, follow the instructions your doctor gave you. If you have no instructions, clamp or tie off the catheter. Then see a doctor as soon as possible. · To help prevent infection, take a shower instead of a bath. Do not go swimming with the catheter. · Try to keep the area dry. When you shower, cover the area with waterproof material, such as plastic wrap. · Never touch the open end of the catheter if the cap is off. · Never use scissors, knives, pins, or other sharp objects near the catheter or other tubing. · If your catheter has a clamp, keep it clamped when you are not using it. · Fasten or tape the catheter to your body to prevent pulling or dangling. · Avoid clothing that rubs or pulls on your catheter. · Avoid bending or crimping your catheter. · Always wash your hands before you touch your catheter. · Wear loose clothing over the catheter for the first 10 to 14 days. When getting dressed, be careful not to pull on the catheter. How to change the dressing  Since the PICC is in one of your arms, you will not be able to change the dressing on your own. You will need someone to help you change the dressing using the same instructions that your doctor or nurse gave you. Your PICC dressing should be changed at least once a week. If the dressing becomes loose, wet, or dirty, it must be changed more often to prevent infection. Your doctor may also give you instructions for when to change the dressing. Be sure you have all your supplies ready. These include medical tape, a surgical mask, sterile gloves, and your dressing kit. The names and brands of the items will vary. Your doctor or nurse may give you specific instructions for changing the dressing. 1. Wash your hands with soap and water for 15 seconds. Dry your hands with paper towels. 2. Put on the surgical mask. 3. Loosen and remove your old dressing. Peel the dressing toward the PICC, not away from it. You may need to use an adhesive remover if your dressing does not come off easily. 4. Look at the site carefully for redness, swelling, drainage, tenderness, or warmth. If you notice any of these, call your doctor. 5. Wash your hands again, and open your dressing kit. Put on the sterile gloves. 6. Clean the site with the supplies in the dressing kit. 7. Use the dressing that your doctor gave you, and place it over the site. 8. Tape the PICC tubing to your skin so that it does not dangle or pull. When should you call for help? Call 911 anytime you think you may need emergency care. For example, call if:  · You passed out (lost consciousness). · You have severe trouble breathing. · You have sudden chest pain and shortness of breath, or you cough up blood. · You have a fast or uneven pulse. Call your doctor now or seek immediate medical care if:  · You have signs of infection, such as:  ¨ Increased pain, swelling, warmth, or redness. ¨ Red streaks leading from the area. ¨ Pus or blood draining from the area. ¨ A fever. · You have swelling in your face, chest, neck, or arm on the side where the catheter is. · You have signs of a blood clot, such as bulging veins near the catheter. · Your catheter is leaking, cracked, or clogged. · You feel resistance when you inject medicine or fluids into your catheter. · Your catheter is out of place. This may happen after severe coughing or vomiting, or if you pull on the catheter. · You have chest pain or shortness of breath. Watch closely for changes in your health, and be sure to contact your doctor if:  · You have any concerns about your catheter. Where can you learn more? Go to http://quincy-quynh.info/.   Enter Q541 in the search box to learn more about \"Peripherally Inserted Central Catheter (PICC): Care Instructions. \"  Current as of: March 20, 2017  Content Version: 11.3  © 3224-2225 [employer]. Care instructions adapted under license by [employer] (which disclaims liability or warranty for this information). If you have questions about a medical condition or this instruction, always ask your healthcare professional. [address] any warranty or liability for your use of this information.

## 2017-10-07 NOTE — ED TRIAGE NOTES
Triage:  Pt to ED due to concern over inability to draw blood from her existing PICC line to right upper arm. Pt states this AM was seen by home health nurse, Pt states her PICC line flushed but was unable to get blood back. Pt states on Thursday her labs were drawn from the PICC. Pt states the PICC line is form home antibiotic use, Pt had MRSA in a spinal abscess with associated laminectomy.  Pt also mentions noted right foot numbness that has developed since this AM, Pt states symptoms occur mostly while sitting and resolve with standing and rest.

## 2017-10-07 NOTE — ED PROVIDER NOTES
HPI Comments: The patient is a 68-year-old female, who was originally diagnosed with a spinal abscess and MRSA currently receiving D5 infusion of antibiotic i.e., vancomycin every 12 hours, presents to the ED, and state that she has been unable to flush or draw any blood from her PICC line. The patient also complains of palpitations, dizziness, lightheadedness. She denies any headache, neck pain, back pain, chest pain, or shortness of breath, abdominal pain, nausea or vomiting, diarrhea, and constipation, dysuria, hematuria, dizziness, extremity weakness or numbness. Patient is a 27 y.o. female presenting with vascular access problem and numbness. Vascular Access Problem    Associated symptoms include numbness. 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 Social History     Social History    Marital status: LEGALLY      Spouse name: N/A    Number of children: N/A    Years of education: N/A     Occupational History    Not on file. Social History Main Topics    Smoking status: Former Smoker    Smokeless tobacco: Never Used    Alcohol use Yes      Comment: occasional    Drug use: No    Sexual activity: Not on file     Other Topics Concern    Not on file     Social History Narrative         ALLERGIES: Iodine    Review of Systems   Neurological: Positive for numbness. All other systems reviewed and are negative. Vitals:    10/07/17 1502   BP: 114/80   Pulse: (!) 123   Resp: 18   Temp: 98.7 °F (37.1 °C)   SpO2: 98%   Weight: 96.2 kg (212 lb)   Height: 5' 6\" (1.676 m)            Physical Exam   Nursing note and vitals reviewed.        CONSTITUTIONAL: Well-appearing; well-nourished; in no apparent distress  HEAD: Normocephalic; atraumatic  EYES: PERRL; EOM intact; conjunctiva and sclera are clear bilaterally. ENT: No rhinorrhea; normal pharynx with no tonsillar hypertrophy; mucous membranes pink/moist, no erythema, no exudate. NECK: Supple; non-tender; no cervical lymphadenopathy  CARD: Normal S1, S2; no murmurs, rubs, or gallops. Regular rate and rhythm. RESP: Normal respiratory effort; breath sounds clear and equal bilaterally; no wheezes, rhonchi, or rales. ABD: Normal bowel sounds; non-distended; non-tender; no palpable organomegaly, no masses, no bruits. Back Exam: Normal inspection; no vertebral point tenderness, no CVA tenderness. Normal range of motion. EXT: Normal ROM in all four extremities; non-tender to palpation; no swelling or deformity; distal pulses are normal, no edema. SKIN: Warm; dry; no rash. NEURO:Alert and oriented x 3, coherent, CHERELLE-XII grossly intact, sensory and motor are non-focal.        MDM  Number of Diagnoses or Management Options  Occluded PICC line, initial encounter [facility]):   Weakness:   Diagnosis management comments: Assessment: weakness/ PICC line occlusion      Plan: Attempt at flushing with lytics or heparin/ lab/ IV fluid/ serial exam/ Monitor and Reevaluate.          Amount and/or Complexity of Data Reviewed  Clinical lab tests: ordered and reviewed  Tests in the radiology section of CPT®: ordered and reviewed  Tests in the medicine section of CPT®: reviewed and ordered  Discussion of test results with the performing providers: yes  Decide to obtain previous medical records or to obtain history from someone other than the patient: yes  Obtain history from someone other than the patient: yes  Review and summarize past medical records: yes  Discuss the patient with other providers: yes  Independent visualization of images, tracings, or specimens: yes    Risk of Complications, Morbidity, and/or Mortality  Presenting problems: moderate  Diagnostic procedures: moderate  Management options: moderate    Patient Progress  Patient progress: stable    ED Course       Procedures     Progress Note:   Pt has been reexamined by Candie Hoyt MD. Pt is feeling much better. Symptoms have improved. All available results have been reviewed with pt and any available family. The PICC line was successfully flushed, and lab were drawn. The patient received her antibiotic. Her medication was refilled. She denies any further discomfort. Pt understands sx, dx, and tx in ED. Care plan has been outlined and questions have been answered. Pt is ready to go home. Will send home on PIcc Line malfunction. Outpatient referral with PCP as needed. Written by Candie Hoyt MD,6:09 PM    .   .

## 2017-10-08 LAB
ATRIAL RATE: 105 BPM
CALCULATED P AXIS, ECG09: 42 DEGREES
CALCULATED R AXIS, ECG10: 31 DEGREES
CALCULATED T AXIS, ECG11: 35 DEGREES
DIAGNOSIS, 93000: NORMAL
P-R INTERVAL, ECG05: 156 MS
Q-T INTERVAL, ECG07: 342 MS
QRS DURATION, ECG06: 92 MS
QTC CALCULATION (BEZET), ECG08: 452 MS
VENTRICULAR RATE, ECG03: 105 BPM

## 2017-10-09 ENCOUNTER — HOME CARE VISIT (OUTPATIENT)
Dept: SCHEDULING | Facility: HOME HEALTH | Age: 32
End: 2017-10-09
Payer: COMMERCIAL

## 2017-10-09 VITALS
DIASTOLIC BLOOD PRESSURE: 90 MMHG | HEART RATE: 110 BPM | OXYGEN SATURATION: 98 % | SYSTOLIC BLOOD PRESSURE: 122 MMHG | TEMPERATURE: 99.2 F | RESPIRATION RATE: 18 BRPM

## 2017-10-09 PROCEDURE — G0299 HHS/HOSPICE OF RN EA 15 MIN: HCPCS

## 2017-10-10 VITALS
SYSTOLIC BLOOD PRESSURE: 138 MMHG | HEART RATE: 104 BPM | OXYGEN SATURATION: 98 % | DIASTOLIC BLOOD PRESSURE: 76 MMHG | RESPIRATION RATE: 20 BRPM | TEMPERATURE: 98.2 F

## 2017-10-10 RX ORDER — FLUCONAZOLE 100 MG/1
100 TABLET ORAL DAILY
Qty: 3 TAB | Refills: 0 | Status: SHIPPED | OUTPATIENT
Start: 2017-10-10 | End: 2017-10-12 | Stop reason: ALTCHOICE

## 2017-10-11 ENCOUNTER — HOME CARE VISIT (OUTPATIENT)
Dept: SCHEDULING | Facility: HOME HEALTH | Age: 32
End: 2017-10-11
Payer: COMMERCIAL

## 2017-10-11 ENCOUNTER — HOSPITAL ENCOUNTER (OUTPATIENT)
Dept: INFUSION THERAPY | Age: 32
Discharge: HOME | End: 2017-10-11
Payer: COMMERCIAL

## 2017-10-11 ENCOUNTER — TELEPHONE (OUTPATIENT)
Dept: INTERNAL MEDICINE CLINIC | Age: 32
End: 2017-10-11

## 2017-10-11 VITALS
RESPIRATION RATE: 16 BRPM | SYSTOLIC BLOOD PRESSURE: 124 MMHG | TEMPERATURE: 99.8 F | HEART RATE: 110 BPM | DIASTOLIC BLOOD PRESSURE: 89 MMHG

## 2017-10-11 PROCEDURE — 74011000250 HC RX REV CODE- 250: Performed by: INTERNAL MEDICINE

## 2017-10-11 PROCEDURE — 74011250636 HC RX REV CODE- 250/636: Performed by: INTERNAL MEDICINE

## 2017-10-11 PROCEDURE — 96374 THER/PROPH/DIAG INJ IV PUSH: CPT

## 2017-10-11 PROCEDURE — G0152 HHCP-SERV OF OT,EA 15 MIN: HCPCS

## 2017-10-11 RX ORDER — HEPARIN 100 UNIT/ML
500 SYRINGE INTRAVENOUS AS NEEDED
Status: ACTIVE | OUTPATIENT
Start: 2017-10-11 | End: 2017-10-12

## 2017-10-11 RX ORDER — SODIUM CHLORIDE 0.9 % (FLUSH) 0.9 %
10-40 SYRINGE (ML) INJECTION AS NEEDED
Status: ACTIVE | OUTPATIENT
Start: 2017-10-11 | End: 2017-10-12

## 2017-10-11 RX ADMIN — Medication 500 UNITS: at 17:08

## 2017-10-11 RX ADMIN — DAPTOMYCIN 750 MG: 500 INJECTION, POWDER, LYOPHILIZED, FOR SOLUTION INTRAVENOUS at 17:01

## 2017-10-11 RX ADMIN — Medication 10 ML: at 16:29

## 2017-10-11 RX ADMIN — Medication 20 ML: at 17:08

## 2017-10-11 NOTE — TELEPHONE ENCOUNTER
Call from patient who reports a red rash on torso and arms (and a little bit of itching). Denies N/V or diarrhea. Last dose of Vancomycin was at 6 a m this morning and taking it Q 8 hrs. I will notify Dr. Leona Menon. Patient's phone is [phone].

## 2017-10-11 NOTE — PROGRESS NOTES
Outpatient Infusion Center Progress Note    4001 Pt admit to [facility] for one dose Daptomycin ambulatory in stable condition. Assessment completed. No new concerns voiced. Patient has been receiving home Vancomycin for about a month. Today she reacted to the Vancomycin with hives. She is here today to transition from home Vancomycin to home Daptomycin. PICC site clean, dry, and intact. Dressing was last changed by home health on 10/9/17. PICC flushes with positive blood return. Visit Vitals    /89 (BP 1 Location: Left arm, BP Patient Position: Sitting)    Pulse (!) 110    Temp 99.8 °F (37.7 °C)    Resp 16    Breastfeeding No       Medications:  NS flushes  Daptomycin  NS flushes  Heparin     Patient was monitored for 15 minutes post infusion. She was discharged with no signs or symptoms of an allergic reaction. 1720 Pt tolerated treatment well. PICC maintained positive blood return throughout treatment. PICC flushed, heparinized, and capped for home health infusions. D/c home ambulatory in no distress. There are no other appointments scheduled as she will receive the remaining Daptomycin infusions at home.

## 2017-10-12 ENCOUNTER — HOME CARE VISIT (OUTPATIENT)
Dept: SCHEDULING | Facility: HOME HEALTH | Age: 32
End: 2017-10-12
Payer: COMMERCIAL

## 2017-10-12 ENCOUNTER — OFFICE VISIT (OUTPATIENT)
Dept: INTERNAL MEDICINE CLINIC | Age: 32
End: 2017-10-12

## 2017-10-12 VITALS
DIASTOLIC BLOOD PRESSURE: 83 MMHG | OXYGEN SATURATION: 99 % | HEIGHT: 66 IN | SYSTOLIC BLOOD PRESSURE: 120 MMHG | RESPIRATION RATE: 20 BRPM | TEMPERATURE: 98.6 F | WEIGHT: 215 LBS | HEART RATE: 106 BPM | BODY MASS INDEX: 34.55 KG/M2

## 2017-10-12 VITALS
SYSTOLIC BLOOD PRESSURE: 112 MMHG | HEART RATE: 103 BPM | OXYGEN SATURATION: 99 % | RESPIRATION RATE: 16 BRPM | DIASTOLIC BLOOD PRESSURE: 62 MMHG | TEMPERATURE: 97.8 F

## 2017-10-12 VITALS
DIASTOLIC BLOOD PRESSURE: 78 MMHG | SYSTOLIC BLOOD PRESSURE: 100 MMHG | TEMPERATURE: 99 F | OXYGEN SATURATION: 98 % | HEART RATE: 108 BPM

## 2017-10-12 DIAGNOSIS — R78.81 MRSA BACTEREMIA: Primary | ICD-10-CM

## 2017-10-12 DIAGNOSIS — B95.62 MRSA BACTEREMIA: Primary | ICD-10-CM

## 2017-10-12 PROCEDURE — G0299 HHS/HOSPICE OF RN EA 15 MIN: HCPCS

## 2017-10-12 RX ORDER — NYSTATIN 100000 [USP'U]/ML
1 SUSPENSION ORAL 4 TIMES DAILY
Qty: 200 ML | Refills: 1 | Status: SHIPPED | OUTPATIENT
Start: 2017-10-12 | End: 2017-11-02 | Stop reason: ALTCHOICE

## 2017-10-12 NOTE — LETTER
10/12/2017 6:11 PM 
 
Patient:  Bhavana Sevilla YOB: 1985 Date of Visit: 10/12/2017 Dear No Recipients: I saw  Ms. Bienvenido Reyes today. Below are the relevant portions of my assessment and plan of care. If you have questions, please do not hesitate to call me. I look forward to following Ms. Jose Raul Denny along with you.  
 
 
 
Sincerely, 
 
 
Bruno Brittle, MD

## 2017-10-12 NOTE — LETTER
10/12/2017 6:20 PM 
 
Patient:  Dulce Mccormick YOB: 1985 Date of Visit: 10/12/2017 Dear No Recipients: I saw  Ms. Pro Myles today. Below are the relevant portions of my assessment  and plan of care. If you have questions, please do not hesitate to call me. I look forward to following Ms. Glenna Kanner along with you.  
 
 
 
Sincerely, 
 
 
Yovanny Berkowitz MD

## 2017-10-12 NOTE — MR AVS SNAPSHOT
Visit Information Date & Time Provider Department Dept. Phone Encounter #  
 10/12/2017  8:10 AM Deandre Zapata MD [facility] Internal Medicine [phone] [id number] Upcoming Health Maintenance Date Due DTaP/Tdap/Td series (1 - Tdap) 2/9/2004 PAP AKA CERVICAL CYTOLOGY 2/9/2004 Allergies as of 10/12/2017  Review Complete On: 10/12/2017 By: Christiano Cuevas LPN Severity Noted Reaction Type Reactions Iodine High 04/08/2011    Anaphylaxis Vancomycin  10/11/2017    Hives Current Immunizations  Reviewed on 10/11/2017 Name Date Influenza Vaccine 8/15/2017 Not reviewed this visit You Were Diagnosed With   
  
 Codes Comments MRSA bacteremia    -  Primary ICD-10-CM: R78.81 ICD-9-CM: 790.7, 041.12 Vitals BP Pulse Temp Resp Height(growth percentile) Weight(growth percentile) 120/83 (BP 1 Location: Left arm, BP Patient Position: Sitting) (!) 106 98.6 °F (37 °C) (Oral) 20 5' 6\" (1.676 m) 215 lb (97.5 kg) SpO2 BMI OB Status Smoking Status 99% 34.7 kg/m2 IUD Former Smoker BMI and BSA Data Body Mass Index Body Surface Area 34.7 kg/m 2 2.13 m 2 Preferred Pharmacy Pharmacy Name Phone [facility] Lim [address] [place] [address] [address] [phone] Your Updated Medication List  
  
   
This list is accurate as of: 10/12/17  9:00 AM.  Always use your most recent med list.  
  
  
  
  
 buPROPion  mg SR tablet Commonly known as:  Rom [place] Take 150 mg by mouth two (2) times a day. cyclobenzaprine 10 mg tablet Commonly known as:  FLEXERIL Take 1 Tab by mouth three (3) times daily as needed for Muscle Spasm(s). DAPTOmycin (CUBICIN) 50 mg/mL IV Syringe  
by IntraVENous route.  
  
 gabapentin 100 mg capsule Commonly known as:  NEURONTIN Take 2 Caps by mouth three (3) times daily. heparin (porcine) 10 unit/mL injection 30 Units by IntraVENous route every eight (8) hours. L. acidoph & paracasei- S therm- Bifido 8 billion cell Cap cap Commonly known as:  ELLEN-Q/RISAQUAD Take 1 Cap by mouth daily. LEXAPRO 10 mg tablet Generic drug:  escitalopram oxalate Take 10 mg by mouth daily. NORMAL SALINE FLUSH INJECTION  
5 mL by IntraVENous route every eight (8) hours. nystatin 100,000 unit/mL suspension Commonly known as:  MYCOSTATIN Take 5 mL by mouth four (4) times daily. swish and spit  
  
 oxyCODONE-acetaminophen 5-325 mg per tablet Commonly known as:  PERCOCET Take 1 Tab by mouth every four (4) hours as needed (migraine). Max Daily Amount: 6 Tabs. senna-docusate 8.6-50 mg per tablet Commonly known as:  Pb [place] Take 1 Tab by mouth daily as needed for Constipation. topiramate 50 mg tablet Commonly known as:  TOPAMAX Take 50 mg by mouth two (2) times a day. Prescriptions Sent to Pharmacy Refills  
 nystatin (MYCOSTATIN) 100,000 unit/mL suspension 1 Sig: Take 5 mL by mouth four (4) times daily. swish and spit Class: Normal  
 Pharmacy: [facility] [place] [address]. Ph #: [phone] Route: Oral  
  
We Performed the Following CK [id number] CPT(R)] METABOLIC PANEL, COMPREHENSIVE [27319 CPT(R)] SED RATE (ESR) E1796227 CPT(R)] To-Do List   
 10/16/2017 To Be Determined Appointment with Laura Diamond at [place] [address]  
  
 10/19/2017 To Be Determined Appointment with Laura Diamond at [place] [address]  
  
 10/23/2017 To Be Determined Appointment with Laura Diamond at [place] [address]  
  
 10/26/2017 To Be Determined Appointment with Laura Diamond at [place] [address]  
  
 10/30/2017 To Be Determined   Appointment with Laura Diamond at [place] [address]  
  
 11/02/2017 To Be Determined Appointment with Manuelito Tucker at [facility] Patient Instructions You are being treated for MRSA bacteremia and MRSA spine infection- You were taking vancomycin until you developed a rash yesterday. We have changed the antibiotic to Daptomycin starting 10/11/17. We will monitor your muscle enzymes and eosinophil count in the blood once a week. Prescribing Nystatin swish and swallow for oral thrush. Also take a Bcomplex vitamin Introducing [facility]! Dear Janee Jett: Thank you for requesting a [employer] account. Our records indicate that you already have an active [employer] account. You can access your account anytime at [email or web address] Did you know that you can access your hospital and ER discharge instructions at any time in [employer]? You can also review all of your test results from your hospital stay or ER visit. Additional Information If you have questions, please visit the Frequently Asked Questions section of the [employer] website at [email or web address] Remember, [employer] is NOT to be used for urgent needs. For medical emergencies, dial 911. Now available from your iPhone and Android! Please provide this summary of care documentation to your next provider. Your primary care clinician is listed as Ronald Farrell. If you have any questions after today's visit, please call [phone].

## 2017-10-12 NOTE — PROGRESS NOTES
Chief Complaint   Patient presents with    Rash     rash on back and chest   [address] [facility] Follow Up     1. Have you been to the ER, urgent care clinic since your last visit? Hospitalized since your last visit? No    2. Have you seen or consulted any other health care providers outside of the [address] [place] [address] since your last visit? Include any pap smears or colon screening.  No

## 2017-10-12 NOTE — LETTER
10/12/2017 6:23 PM 
 
Patient:  Stormy Og YOB: 1985 Date of Visit: 10/12/2017 Dear No Recipients: Thank you for referring Ms. Mo Bustos to me for evaluation/treatment. Below are the relevant portions of my assessment and plan of care. If you have questions, please do not hesitate to call me. I look forward to following Ms. Gary Johnson along with you.  
 
 
 
Sincerely, 
 
 
Jimi Mae MD

## 2017-10-12 NOTE — PATIENT INSTRUCTIONS
You are being treated for MRSA bacteremia and MRSA spine infection- You were taking vancomycin until you developed a rash yesterday. We have changed the antibiotic to Daptomycin starting 10/11/17. We will monitor your muscle enzymes and eosinophil count in the blood once a week. Prescribing Nystatin swish and swallow for oral thrush.    Also take a Bcomplex vitamin

## 2017-10-12 NOTE — LETTER
10/12/2017 6:22 PM 
 
Patient:  Bhavana Sevilla YOB: 1985 Date of Visit: 10/12/2017 Dear No Recipients: Thank you for referring Ms. Bienvenido Reyes to me for evaluation/treatment. Below are the relevant portions of my assessment and plan of care. If you have questions, please do not hesitate to call me. I look forward to following Ms. Blunt Lipyaritza along with you.  
 
 
 
Sincerely, 
 
 
Bruno Brittle, MD

## 2017-10-12 NOTE — PROGRESS NOTES
ID Initial Visit    NAME:  Christin Mann                      :   1985                       MRN:   [id number]   Date/Time:  10/12/2017 8:49 AM  Subjective:   REASON FOR CONSULT:   Follow up       Christin Mann  is a 27 y. o.female was recently in [facility] for MRSA bacteremia and epidural abscess. MRI which revealed Extensive epidural abscess from the L2 level to the mid L5 in the posterior right spinal canal, likely originating from inflammatory changes in the right L5-S1 facet joint. and underwent  laminectomy l3-L4  and drainage of epidural abscess. She stayed in the hospital between -17 and was sent home on vancomycin 1500mg Q 8 until 17. She was doing well until 10/11 when she developed a papular rash following vancomycin dose      She was sent to [facility] and got her first dose of daptomycin 750mg IV and tolerated it well  She is here for follow up  No fever or chills  Back pain better  C/o sore mouth and itchy vaginal discharge  She saw Neurosurgery as OP and ahs another appt at the end of this month  Weekly labs are okay with normal cr and ESR from 10/9 is 44 ( 117 on 17)  She has lupus and was on plaquenil which is on hold since this infection. 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      Social History Main Topics    Smoking status: Former Smoker    Smokeless tobacco: Never Used    Alcohol use Yes      Comment: occasional    Drug use: No    Sexual activity: Not on file      Comment: richard has 2 children Other Topics Concern    Not on file     Social History Narrative      Family History   Problem Relation Age of Onset    Diabetes Father     Diabetes Brother      Allergies   Allergen Reactions    Iodine Anaphylaxis    Vancomycin Hives       Meds reviewed     REVIEW OF SYSTEMS:     Const: negative fever, negative chills, negative weight loss  Eyes:  negative diplopia or visual changes, negative eye pain  ENT:  negative coryza, negative sore throat  Resp:  negative cough, hemoptysis, dyspnea  Cards: negative for chest pain, palpitations, lower extremity edema  : negative for frequency, dysuria and hematuria  Heme: negative for easy bruising and gum/nose bleeding  MS: Back pain   Neurolo:negative for headaches, dizziness, vertigo, memory problems   Psych: negative for feelings of anxiety, depression     Pertinent Positives include :    Objective:   VITALS:    Visit Vitals    /83 (BP 1 Location: Left arm, BP Patient Position: Sitting)    Pulse (!) 106    Temp 98.6 °F (37 °C) (Oral)    Resp 20    Ht 5' 6\" (1.676 m)    Wt 215 lb (97.5 kg)    SpO2 99%    BMI 34.7 kg/m2       PHYSICAL EXAM:   General:    Alert, cooperative, no distress, appears stated age. Head:   Normocephalic, without obvious abnormality, atraumatic. Eyes:   Conjunctivae clear, anicteric sclerae. Pupils are equal  Nose:  Nares normal. No drainage or sinus tenderness. Oral cavity- tongue coated     Neck:  Supple, symmetrical,  no adenopathy, thyroid: non tender    no carotid bruit and no JVD. Back:    No CVA tenderness. Lumbar surgical site- looks well  Lungs:   Clear to auscultation bilaterally. No Wheezing or Rhonchi. No rales. Heart:   Regular rate and rhythm,  no murmur, rub or gallop. Abdomen:   Soft, non-tender,not distended.   Bowel sounds normal. No masses  Extremities: Rt picc  Erythema arm   Rt fore arm- 1 cm healing surgical wound  Skin:     Papular rash over the chest and back  Lymph: Cervical, supraclavicular normal.  Neurologic: Grossly non-focal    Pertinent Labs  Reviewed  IMAGING RESULTS:  None    Impression/Recommendation  MRSA bacteremia with epidural abscess- being treated- infection much improved- ESR trending down nicely  Was on vancomycin until 10/11- because of a new onset rash changed to daptomycin 8mg/kg body weight  Monitor closely CPK/Eosinophil  Will continue Dapto until 11/2/17    Papular eruption on trunk and arms - secondary to vancomycin. Continue benadryl for another day or     Candidal infection of the mouth and vagina- for nystatin and suppository. Fluconazole with lexapro risk of prolonged QT- She may need fluconazole if no response to the above treatment    Lupus- was on plaquenil-which is on hold since sept 18th.   May start next week    Discussed with patient in great detail  Will follow along

## 2017-10-15 ENCOUNTER — HOSPITAL ENCOUNTER (OUTPATIENT)
Dept: LAB | Age: 32
Discharge: HOME | End: 2017-10-15

## 2017-10-15 ENCOUNTER — HOSPITAL ENCOUNTER (EMERGENCY)
Age: 32
Discharge: HOME | End: 2017-10-15
Attending: FAMILY MEDICINE

## 2017-10-15 VITALS
OXYGEN SATURATION: 98 % | RESPIRATION RATE: 18 BRPM | WEIGHT: 216 LBS | BODY MASS INDEX: 34.72 KG/M2 | HEIGHT: 66 IN | DIASTOLIC BLOOD PRESSURE: 82 MMHG | HEART RATE: 107 BPM | TEMPERATURE: 97.7 F | SYSTOLIC BLOOD PRESSURE: 113 MMHG

## 2017-10-15 DIAGNOSIS — L50.8 URTICARIA, ACUTE: Primary | ICD-10-CM

## 2017-10-15 LAB
ANION GAP SERPL CALC-SCNC: 10 MMOL/L (ref 5–15)
BASOPHILS # BLD: 0.1 K/UL (ref 0–0.1)
BASOPHILS NFR BLD: 2 % (ref 0–1)
BLASTS NFR BLD MANUAL: 0 %
BUN SERPL-MCNC: 11 MG/DL (ref 6–20)
BUN/CREAT SERPL: 11 (ref 12–20)
CALCIUM SERPL-MCNC: 9.3 MG/DL (ref 8.5–10.1)
CHLORIDE SERPL-SCNC: 105 MMOL/L (ref 97–108)
CK SERPL-CCNC: 49 U/L (ref 26–192)
CO2 SERPL-SCNC: 24 MMOL/L (ref 21–32)
CREAT SERPL-MCNC: 1.02 MG/DL (ref 0.55–1.02)
DIFFERENTIAL METHOD BLD: ABNORMAL
EOSINOPHIL # BLD: 0.7 K/UL (ref 0–0.4)
EOSINOPHIL NFR BLD: 12 % (ref 0–7)
ERYTHROCYTE [DISTWIDTH] IN BLOOD BY AUTOMATED COUNT: 13.8 % (ref 11.5–14.5)
ERYTHROCYTE [SEDIMENTATION RATE] IN BLOOD: 30 MM/HR (ref 0–20)
GLUCOSE SERPL-MCNC: 85 MG/DL (ref 65–100)
HCT VFR BLD AUTO: 42.2 % (ref 35–47)
HGB BLD-MCNC: 14.2 G/DL (ref 11.5–16)
LYMPHOCYTES # BLD: 0.9 K/UL (ref 0.8–3.5)
LYMPHOCYTES NFR BLD: 16 % (ref 12–49)
MANUAL DIFFERENTIAL PERFORMED BLD QL: ABNORMAL
MCH RBC QN AUTO: 31.3 PG (ref 26–34)
MCHC RBC AUTO-ENTMCNC: 33.6 G/DL (ref 30–36.5)
MCV RBC AUTO: 93 FL (ref 80–99)
METAMYELOCYTES NFR BLD MANUAL: 0 %
MONOCYTES # BLD: 0.3 K/UL (ref 0–1)
MONOCYTES NFR BLD: 5 % (ref 5–13)
MYELOCYTES NFR BLD MANUAL: 0 %
NEUTS BAND NFR BLD MANUAL: 0 % (ref 0–6)
NEUTS SEG # BLD: 3.9 K/UL (ref 1.8–8)
NEUTS SEG NFR BLD: 65 % (ref 32–75)
NRBC # BLD: 0 K/UL (ref 0–0.01)
NRBC BLD-RTO: 0 PER 100 WBC
OTHER CELLS NFR BLD MANUAL: 0 %
PLATELET # BLD AUTO: 268 K/UL (ref 150–400)
PLATELET COMMENTS,PCOM: ABNORMAL
POTASSIUM SERPL-SCNC: 4 MMOL/L (ref 3.5–5.1)
PROMYELOCYTES NFR BLD MANUAL: 0 %
RBC # BLD AUTO: 4.54 M/UL (ref 3.8–5.2)
RBC MORPH BLD: ABNORMAL
SODIUM SERPL-SCNC: 139 MMOL/L (ref 136–145)
WBC # BLD AUTO: 5.9 K/UL (ref 3.6–11)

## 2017-10-15 PROCEDURE — 85652 RBC SED RATE AUTOMATED: CPT

## 2017-10-15 PROCEDURE — 82550 ASSAY OF CK (CPK): CPT

## 2017-10-15 PROCEDURE — 80048 BASIC METABOLIC PNL TOTAL CA: CPT

## 2017-10-15 PROCEDURE — 36415 COLL VENOUS BLD VENIPUNCTURE: CPT

## 2017-10-15 PROCEDURE — 86038 ANTINUCLEAR ANTIBODIES: CPT

## 2017-10-15 PROCEDURE — 85027 COMPLETE CBC AUTOMATED: CPT

## 2017-10-15 RX ORDER — PREDNISONE 20 MG/1
TABLET ORAL
Qty: 21 TAB | Refills: 0 | Status: SHIPPED | OUTPATIENT
Start: 2017-10-15 | End: 2017-11-02 | Stop reason: ALTCHOICE

## 2017-10-15 RX ORDER — HYDROXYZINE PAMOATE 25 MG/1
25 CAPSULE ORAL
Qty: 20 CAP | Refills: 0 | Status: SHIPPED | OUTPATIENT
Start: 2017-10-15 | End: 2017-11-10

## 2017-10-15 NOTE — UC PROVIDER NOTE
Patient is a 27 y.o. female presenting with rash. The history is provided by the patient (treating ID physician ). Rash    This is a new problem. The current episode started more than 2 days ago. The problem has not changed since onset. The problem is associated with medication (probably vancomycin ). There has been no fever. Affected Location: generalized. Associated symptoms include itching and hives. She has tried nothing and oral antihistamines for the symptoms. The treatment provided mild relief. 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 Social History Main Topics    Smoking status: Former Smoker    Smokeless tobacco: Never Used    Alcohol use Yes      Comment: occasional    Drug use: No    Sexual activity: Not on file      Comment: richrad has 2 children     Other Topics Concern    Not on file     Social History Narrative                ALLERGIES: Iodine and Vancomycin    Review of Systems   Skin: Positive for itching and rash. All other systems reviewed and are negative. Vitals:    10/15/17 1257   BP: 113/82   Pulse: (!) 107   Resp: 18   Temp: 97.7 °F (36.5 °C)   SpO2: 98%   Weight: 98 kg (216 lb)   Height: 5' 6\" (1.676 m)       Physical Exam   Constitutional: She is oriented to person, place, and time. She appears well-developed and well-nourished. HENT:   Head: Normocephalic and atraumatic.    Right Ear: External ear normal.   Left Ear: External ear normal.   Mouth/Throat: Oropharynx is clear and moist. No oropharyngeal exudate. Eyes: Conjunctivae and EOM are normal. Pupils are equal, round, and reactive to light. Right eye exhibits no discharge. Left eye exhibits no discharge. No scleral icterus. Neck: Normal range of motion. No tracheal deviation present. No thyromegaly present. Cardiovascular: Normal rate, regular rhythm and normal heart sounds. No murmur heard. Pulmonary/Chest: Effort normal and breath sounds normal. No respiratory distress. She has no wheezes. She has no rales. She exhibits no tenderness. Abdominal: Soft. Bowel sounds are normal. She exhibits no distension. There is no tenderness. There is no rebound and no guarding. Musculoskeletal: Normal range of motion. She exhibits no edema or tenderness. Lymphadenopathy:     She has no cervical adenopathy. Neurological: She is alert and oriented to person, place, and time. No cranial nerve deficit. Coordination normal.   Skin: Skin is warm. Rash (generalized- most on trunk and extremities) noted. Rash is maculopapular and urticarial. No erythema. Psychiatric: She has a normal mood and affect. Her behavior is normal. Judgment and thought content normal.   Nursing note and vitals reviewed. MDM     Differential Diagnosis; Clinical Impression; Plan:     CLINICAL IMPRESSION:  Urticaria, acute  (primary encounter diagnosis)      DDX    Plan:    Solumedrol 125 mg now  Atarax and tapering oral prednisone if rash persist    D/ W Id doctor Dr. Alex Rubi ( who is treating her infection  ) and labs ordered as suggested - CBC- ESR, CPK, JEROD   Amount and/or Complexity of Data Reviewed:    Review and summarize past medical records:  Yes  Risk of Significant Complications, Morbidity, and/or Mortality:   Presenting problems: Moderate  Management options:   Moderate  Progress:   Patient progress:  Stable      Procedures

## 2017-10-15 NOTE — DISCHARGE INSTRUCTIONS
Hives: Care Instructions  Your Care Instructions  Hives are raised, red, itchy patches of skin. They are also called wheals or welts. They usually have red borders and pale centers. Hives range in size from ¼ inch to 3 inches or more across. They may seem to move from place to place on the skin. Several hives may form a large area of raised, red skin. You can get hives after an insect sting, after taking medicine or eating certain foods, or because of infection or stress. Other causes include plants, things you breathe in, makeup, heat, cold, sunlight, and latex. You cannot spread hives to other people. Hives may last a few minutes or a few days, but a single spot may last less than 36 hours. Follow-up care is a key part of your treatment and safety. Be sure to make and go to all appointments, and call your doctor if you are having problems. It's also a good idea to know your test results and keep a list of the medicines you take. How can you care for yourself at home? · Avoid whatever you think may have caused your hives, such as a certain food or medicine. However, you may not know the cause. · Put a cool, wet towel on the area to relieve itching. · Take an over-the-counter antihistamine, such as diphenhydramine (Benadryl), cetirizine (Zyrtec), or loratadine (Claritin), to help stop the hives and calm the itching. Read and follow directions on the label. These medicines can make you feel sleepy. Do not drive while using them. · Stay away from strong soaps, detergents, and chemicals. These can make itching worse. When should you call for help? Call 911 anytime you think you may need emergency care. For example, call if:  · You have symptoms of a severe allergic reaction. These may include:  ¨ Sudden raised, red areas (hives) all over your body. ¨ Swelling of the throat, mouth, lips, or tongue. ¨ Trouble breathing. ¨ Passing out (losing consciousness).  Or you may feel very lightheaded or suddenly feel weak, confused, or restless. Call your doctor now or seek immediate medical care if:  · You have symptoms of an allergic reaction, such as:  ¨ A rash or hives (raised, red areas on the skin). ¨ Itching. ¨ Swelling. ¨ Belly pain, nausea, or vomiting. · You get hives after you start a new medicine. · Hives have not gone away after 24 hours. Watch closely for changes in your health, and be sure to contact your doctor if:  · You do not get better as expected. Where can you learn more? Go to http://quincyContraFectquynh.info/. Enter D284 in the search box to learn more about \"Hives: Care Instructions. \"  Current as of: March 20, 2017  Content Version: 11.3  © 6580-9610 [employer]. Care instructions adapted under license by [employer] (which disclaims liability or warranty for this information). If you have questions about a medical condition or this instruction, always ask your healthcare professional. [address] any warranty or liability for your use of this information.

## 2017-10-16 ENCOUNTER — HOME CARE VISIT (OUTPATIENT)
Dept: SCHEDULING | Facility: HOME HEALTH | Age: 32
End: 2017-10-16
Payer: COMMERCIAL

## 2017-10-16 VITALS
SYSTOLIC BLOOD PRESSURE: 124 MMHG | DIASTOLIC BLOOD PRESSURE: 88 MMHG | HEART RATE: 107 BPM | TEMPERATURE: 98.8 F | RESPIRATION RATE: 18 BRPM | OXYGEN SATURATION: 97 %

## 2017-10-16 PROCEDURE — G0299 HHS/HOSPICE OF RN EA 15 MIN: HCPCS

## 2017-10-17 ENCOUNTER — DOCUMENTATION ONLY (OUTPATIENT)
Dept: INTERNAL MEDICINE CLINIC | Age: 32
End: 2017-10-17

## 2017-10-17 LAB
ANA SER QL: NEGATIVE
SEE BELOW:, 164879: NORMAL

## 2017-10-17 NOTE — PROGRESS NOTES
PT called on Branden 10/15/17 that the rash was getting worse- She went to [facility]- I spoke to the physician  .  She had labs which were okay except eosinophil count of 12%  The rash was due to vancomycin and not dapto- she was prescribed asked her to continue IV dapto for MRSA bacteremia and epidural abscess prednisone  Spoke to her on 10/16= she is doing fine( she did not take dapto on 10/15/17)

## 2017-10-19 ENCOUNTER — HOME CARE VISIT (OUTPATIENT)
Dept: SCHEDULING | Facility: HOME HEALTH | Age: 32
End: 2017-10-19
Payer: COMMERCIAL

## 2017-10-19 PROCEDURE — G0300 HHS/HOSPICE OF LPN EA 15 MIN: HCPCS

## 2017-10-23 ENCOUNTER — HOME CARE VISIT (OUTPATIENT)
Dept: SCHEDULING | Facility: HOME HEALTH | Age: 32
End: 2017-10-23
Payer: COMMERCIAL

## 2017-10-23 VITALS
RESPIRATION RATE: 16 BRPM | TEMPERATURE: 98.5 F | DIASTOLIC BLOOD PRESSURE: 85 MMHG | OXYGEN SATURATION: 99 % | HEART RATE: 106 BPM | SYSTOLIC BLOOD PRESSURE: 122 MMHG

## 2017-10-23 VITALS
HEART RATE: 101 BPM | RESPIRATION RATE: 12 BRPM | SYSTOLIC BLOOD PRESSURE: 114 MMHG | DIASTOLIC BLOOD PRESSURE: 86 MMHG | OXYGEN SATURATION: 99 % | TEMPERATURE: 98.4 F

## 2017-10-23 PROCEDURE — G0299 HHS/HOSPICE OF RN EA 15 MIN: HCPCS

## 2017-10-30 ENCOUNTER — HOME CARE VISIT (OUTPATIENT)
Dept: SCHEDULING | Facility: HOME HEALTH | Age: 32
End: 2017-10-30
Payer: COMMERCIAL

## 2017-10-30 VITALS
HEART RATE: 110 BPM | OXYGEN SATURATION: 99 % | DIASTOLIC BLOOD PRESSURE: 80 MMHG | SYSTOLIC BLOOD PRESSURE: 120 MMHG | TEMPERATURE: 98.2 F | RESPIRATION RATE: 18 BRPM

## 2017-10-30 PROCEDURE — G0299 HHS/HOSPICE OF RN EA 15 MIN: HCPCS

## 2017-11-02 ENCOUNTER — HOSPITAL ENCOUNTER (OUTPATIENT)
Dept: NON INVASIVE DIAGNOSTICS | Age: 32
Discharge: HOME | End: 2017-11-02
Payer: COMMERCIAL

## 2017-11-02 ENCOUNTER — OFFICE VISIT (OUTPATIENT)
Dept: INTERNAL MEDICINE CLINIC | Age: 32
End: 2017-11-02

## 2017-11-02 VITALS
SYSTOLIC BLOOD PRESSURE: 124 MMHG | DIASTOLIC BLOOD PRESSURE: 91 MMHG | BODY MASS INDEX: 34.87 KG/M2 | OXYGEN SATURATION: 99 % | HEIGHT: 66 IN | WEIGHT: 217 LBS | RESPIRATION RATE: 16 BRPM | HEART RATE: 120 BPM | TEMPERATURE: 98.9 F

## 2017-11-02 DIAGNOSIS — M46.20 VERTEBRAL OSTEOMYELITIS, ACUTE (HCC): Primary | ICD-10-CM

## 2017-11-02 DIAGNOSIS — R00.0 TACHYCARDIA: ICD-10-CM

## 2017-11-02 LAB
ATRIAL RATE: 95 BPM
CALCULATED P AXIS, ECG09: 24 DEGREES
CALCULATED R AXIS, ECG10: 18 DEGREES
CALCULATED T AXIS, ECG11: 30 DEGREES
DIAGNOSIS, 93000: NORMAL
P-R INTERVAL, ECG05: 150 MS
Q-T INTERVAL, ECG07: 382 MS
QRS DURATION, ECG06: 96 MS
QTC CALCULATION (BEZET), ECG08: 480 MS
VENTRICULAR RATE, ECG03: 95 BPM

## 2017-11-02 PROCEDURE — 93005 ELECTROCARDIOGRAM TRACING: CPT

## 2017-11-02 RX ORDER — HYDROXYCHLOROQUINE SULFATE 200 MG/1
200 TABLET, FILM COATED ORAL 2 TIMES DAILY
COMMUNITY
Start: 2017-08-30

## 2017-11-02 RX ORDER — MUPIROCIN 20 MG/G
OINTMENT TOPICAL
COMMUNITY
Start: 2017-08-31 | End: 2017-11-10

## 2017-11-02 NOTE — LETTER
11/2/2017 2:46 PM 
 
Patient:  Arlice Bernheim YOB: 1985 Date of Visit: 11/2/2017 Dear No Recipients: Thank you for referring Ms. Zuri Ryan to me for evaluation/treatment. Below are the relevant portions of my assessment and plan of care. If you have questions, please do not hesitate to call me. I look forward to following Ms. Uche Kwon along with you.  
 
 
 
Sincerely, 
 
 
Artemio Romero MD

## 2017-11-02 NOTE — PROGRESS NOTES
ID Initial Visit    NAME:  Stormy Og                      :   1985                       MRN:   [id number]   Date/Time:  2017 8:49 AM  Subjective:   REASON FOR CONSULT:   Follow up     Here for follow up visit   Stormy Og  is a 27 y. o.female was recently in [facility] for MRSA bacteremia and epidural abscess. MRI which revealed Extensive epidural abscess from the L2 level to the mid L5 in the posterior right spinal canal, likely originating from inflammatory changes in the right L5-S1 facet joint. There was also a small1 cm psoas collection on the rt side  She underwent  laminectomy l3-L4  and drainage of epidural abscess. She stayed in the hospital between -17 and was sent home on vancomycin 1500mg Q 8 until 17. She was doing well until 10/11 when she developed a papular rash following vancomycin dose- she went to [facility] and giot a short course of steroids- VAnco was discontinued and she started Daptomycin. She has been doing fine since then  Weekly labs are okay with normal cr /CK and ESR from 10/17  was 30  ( 117 on 17)    She had a follow up with  neurosurgeon on 10/30 and has been cleared to get PT. She has no fever . Chills, diarrhea, pain abdomen  Back pain is better than before but has some tightness  She says her heart rate has been 100 or above all along and some times she can feel it.  No chest pain or sob  No dizziness except when she gets up from bed quickly  She is on celexa, neurontin, topamax, flexeril, lexapro and daptomycin  She has restarted plaquenil a week ago      Past Medical History:   Diagnosis Date    Abscess in epidural space of lumbar spine     Lupus     PVC's (premature ventricular contractions)       Past Surgical History:   Procedure Laterality Date    HX  SECTION  2012    HX LUMBAR LAMINECTOMY      HX ORTHOPAEDIC Bilateral     knee    HX TONSILLECTOMY        Social History     Social History    Marital status: LEGALLY      Spouse name: N/A    Number of children: N/A    Years of education: N/A     Occupational History    Not on file. Social History Main Topics    Smoking status: Former Smoker    Smokeless tobacco: Never Used    Alcohol use Yes      Comment: occasional    Drug use: No    Sexual activity: Not on file      Comment: richard has 2 children     Other Topics Concern    Not on file     Social History Narrative      Family History   Problem Relation Age of Onset    Diabetes Father     Diabetes Brother      Allergies   Allergen Reactions    Iodine Anaphylaxis    Vancomycin Hives       Meds reviewed     REVIEW OF SYSTEMS:     Const: negative fever, negative chills, negative weight loss  Eyes:  negative diplopia or visual changes, negative eye pain  ENT:  negative coryza, negative sore throat  Resp:  negative cough, hemoptysis, dyspnea  Cards: palpitations, no lower extremity edema  : negative for frequency, dysuria and hematuria  Heme: negative for easy bruising and gum/nose bleeding  MS: Back pain   Neurolo:negative for headaches, dizziness, vertigo, memory problems   Psych: negative for feelings of anxiety, depression       Objective:   VITALS:    Visit Vitals    BP (!) 124/91 (BP 1 Location: Left arm, BP Patient Position: Sitting)    Pulse (!) 120    Temp 98.9 °F (37.2 °C) (Oral)    Resp 16    Ht 5' 6\" (1.676 m)    Wt 217 lb (98.4 kg)    SpO2 99%    BMI 35.02 kg/m2     Repeat BP lying 124/93   Standing  and /89    PHYSICAL EXAM:   General:    Alert, cooperative, no distress, appears stated age. Head:   Normocephalic, without obvious abnormality, atraumatic. Eyes:   Conjunctivae clear, anicteric sclerae. Pupils are equal  Nose:  Nares normal. No drainage or sinus tenderness. Oral cavity- tongue coated     Neck:  Supple,  Back:    No CVA tenderness. Lungs:   Clear to auscultation bilaterally.     Heart:   Sinus tachycardia  Abdomen:   Soft,  Extremities: Rt picc    Rt fore arm- 1 cm healed surgical wound  Skin:     No rash  Lymph: Cervical, supraclavicular normal.  Neurologic: Grossly non-focal    Pertinent Labs  Reviewed  IMAGING RESULTS:  None    Impression/Recommendation  MRSA bacteremia with epidural abscess- being treated- infection much improved- ESR trending down nicely  Was on vancomycin until 10/11- because of a new onset rash changed to daptomycin 8mg/kg body weight  Completing 6 weeks of IV antibiotic 11/2/17  Will get labs today and then decide to discontinue daptomycin    Sinus tachycardia since 2 months- will get EKG/TFT    Papular eruption on trunk and arms - secondary to vancomycin.  resolved    Lupus- restarted plaquenil    Discussed with patient in great detail  Also spoke to [facility] infusion company

## 2017-11-02 NOTE — PROGRESS NOTES
Chief Complaint   Patient presents with    Medication Evaluation     PICC line     1. Have you been to the ER, urgent care clinic since your last visit? Hospitalized since your last visit? No    2. Have you seen or consulted any other health care providers outside of the [address] [place] [address] since your last visit? Include any pap smears or colon screening.  No

## 2017-11-03 LAB
ALBUMIN SERPL-MCNC: 5 G/DL (ref 3.5–5.5)
ALBUMIN/GLOB SERPL: 1.8 {RATIO} (ref 1.2–2.2)
ALP SERPL-CCNC: 118 IU/L (ref 39–117)
ALT SERPL-CCNC: 20 IU/L (ref 0–32)
AST SERPL-CCNC: 16 IU/L (ref 0–40)
BASOPHILS # BLD AUTO: 0.1 X10E3/UL (ref 0–0.2)
BASOPHILS NFR BLD AUTO: 1 %
BILIRUB SERPL-MCNC: 0.5 MG/DL (ref 0–1.2)
BUN SERPL-MCNC: 13 MG/DL (ref 6–20)
BUN/CREAT SERPL: 12 (ref 9–23)
CALCIUM SERPL-MCNC: 9.9 MG/DL (ref 8.7–10.2)
CHLORIDE SERPL-SCNC: 100 MMOL/L (ref 96–106)
CK SERPL-CCNC: 47 U/L (ref 24–173)
CO2 SERPL-SCNC: 22 MMOL/L (ref 18–29)
CREAT SERPL-MCNC: 1.05 MG/DL (ref 0.57–1)
EOSINOPHIL # BLD AUTO: 0.3 X10E3/UL (ref 0–0.4)
EOSINOPHIL NFR BLD AUTO: 4 %
ERYTHROCYTE [DISTWIDTH] IN BLOOD BY AUTOMATED COUNT: 14.2 % (ref 12.3–15.4)
ERYTHROCYTE [SEDIMENTATION RATE] IN BLOOD BY WESTERGREN METHOD: 4 MM/HR (ref 0–32)
GFR SERPLBLD CREATININE-BSD FMLA CKD-EPI: 69 ML/MIN/1.73
GFR SERPLBLD CREATININE-BSD FMLA CKD-EPI: 80 ML/MIN/1.73
GLOBULIN SER CALC-MCNC: 2.8 G/DL (ref 1.5–4.5)
GLUCOSE SERPL-MCNC: 89 MG/DL (ref 65–99)
HCT VFR BLD AUTO: 42.1 % (ref 34–46.6)
HGB BLD-MCNC: 14.3 G/DL (ref 11.1–15.9)
IMM GRANULOCYTES # BLD: 0 X10E3/UL (ref 0–0.1)
IMM GRANULOCYTES NFR BLD: 0 %
LYMPHOCYTES # BLD AUTO: 1.6 X10E3/UL (ref 0.7–3.1)
LYMPHOCYTES NFR BLD AUTO: 23 %
MCH RBC QN AUTO: 31.9 PG (ref 26.6–33)
MCHC RBC AUTO-ENTMCNC: 34 G/DL (ref 31.5–35.7)
MCV RBC AUTO: 94 FL (ref 79–97)
MONOCYTES # BLD AUTO: 0.4 X10E3/UL (ref 0.1–0.9)
MONOCYTES NFR BLD AUTO: 6 %
NEUTROPHILS # BLD AUTO: 4.6 X10E3/UL (ref 1.4–7)
NEUTROPHILS NFR BLD AUTO: 66 %
PLATELET # BLD AUTO: 253 X10E3/UL (ref 150–379)
POTASSIUM SERPL-SCNC: 4.4 MMOL/L (ref 3.5–5.2)
PROCALCITONIN SERPL-MCNC: 0.04 NG/ML (ref 0–0.08)
PROT SERPL-MCNC: 7.8 G/DL (ref 6–8.5)
RBC # BLD AUTO: 4.48 X10E6/UL (ref 3.77–5.28)
SODIUM SERPL-SCNC: 139 MMOL/L (ref 134–144)
T4 FREE SERPL-MCNC: 0.71 NG/DL (ref 0.82–1.77)
TSH SERPL DL<=0.005 MIU/L-ACNC: 1.89 UIU/ML (ref 0.45–4.5)
WBC # BLD AUTO: 7 X10E3/UL (ref 3.4–10.8)

## 2017-11-03 RX ORDER — SULFAMETHOXAZOLE AND TRIMETHOPRIM 800; 160 MG/1; MG/1
1 TABLET ORAL 2 TIMES DAILY
Qty: 14 TAB | Refills: 0 | Status: SHIPPED | OUTPATIENT
Start: 2017-11-03 | End: 2017-11-13

## 2017-11-03 NOTE — PROGRESS NOTES
Informed patient that all ehr labs were fine- ESR 4/cr N  She will stop dapto 9 completed 6 weeks) and PICC line will be removed- will do a week of bactrim

## 2017-11-03 NOTE — PROGRESS NOTES
On reviewing all the details She will not need PO bactrim-no further antibiotic needed- informed patient

## 2017-11-05 ENCOUNTER — HOME CARE VISIT (OUTPATIENT)
Dept: SCHEDULING | Facility: HOME HEALTH | Age: 32
End: 2017-11-05
Payer: COMMERCIAL

## 2017-11-05 VITALS
DIASTOLIC BLOOD PRESSURE: 70 MMHG | HEART RATE: 104 BPM | SYSTOLIC BLOOD PRESSURE: 100 MMHG | RESPIRATION RATE: 18 BRPM | TEMPERATURE: 98.5 F | OXYGEN SATURATION: 98 %

## 2017-11-05 PROCEDURE — G0299 HHS/HOSPICE OF RN EA 15 MIN: HCPCS

## 2017-11-10 ENCOUNTER — APPOINTMENT (OUTPATIENT)
Dept: MRI IMAGING | Age: 32
DRG: 096 | End: 2017-11-10
Attending: EMERGENCY MEDICINE
Payer: COMMERCIAL

## 2017-11-10 ENCOUNTER — DOCUMENTATION ONLY (OUTPATIENT)
Dept: INTERNAL MEDICINE CLINIC | Age: 32
End: 2017-11-10

## 2017-11-10 ENCOUNTER — HOSPITAL ENCOUNTER (INPATIENT)
Age: 32
LOS: 3 days | Discharge: HOME HEALTH | DRG: 096 | End: 2017-11-13
Attending: EMERGENCY MEDICINE | Admitting: FAMILY MEDICINE
Payer: COMMERCIAL

## 2017-11-10 DIAGNOSIS — M46.20 SPINAL ABSCESS (HCC): Primary | ICD-10-CM

## 2017-11-10 LAB
ALBUMIN SERPL-MCNC: 4 G/DL (ref 3.5–5)
ALBUMIN/GLOB SERPL: 0.9 {RATIO} (ref 1.1–2.2)
ALP SERPL-CCNC: 116 U/L (ref 45–117)
ALT SERPL-CCNC: 30 U/L (ref 12–78)
ANION GAP SERPL CALC-SCNC: 10 MMOL/L (ref 5–15)
APPEARANCE UR: CLEAR
AST SERPL-CCNC: 14 U/L (ref 15–37)
BACTERIA URNS QL MICRO: NEGATIVE /HPF
BASOPHILS # BLD: 0 K/UL (ref 0–0.1)
BASOPHILS NFR BLD: 0 % (ref 0–1)
BILIRUB SERPL-MCNC: 0.3 MG/DL (ref 0.2–1)
BILIRUB UR QL: NEGATIVE
BUN SERPL-MCNC: 9 MG/DL (ref 6–20)
BUN/CREAT SERPL: 11 (ref 12–20)
CALCIUM SERPL-MCNC: 9.4 MG/DL (ref 8.5–10.1)
CHLORIDE SERPL-SCNC: 106 MMOL/L (ref 97–108)
CK SERPL-CCNC: 63 U/L (ref 26–192)
CO2 SERPL-SCNC: 25 MMOL/L (ref 21–32)
COLOR UR: NORMAL
CREAT SERPL-MCNC: 0.83 MG/DL (ref 0.55–1.02)
CRP SERPL-MCNC: 2.73 MG/DL (ref 0–0.6)
EOSINOPHIL # BLD: 0.3 K/UL (ref 0–0.4)
EOSINOPHIL NFR BLD: 3 % (ref 0–7)
EPITH CASTS URNS QL MICRO: NORMAL /LPF
ERYTHROCYTE [DISTWIDTH] IN BLOOD BY AUTOMATED COUNT: 13.5 % (ref 11.5–14.5)
ERYTHROCYTE [SEDIMENTATION RATE] IN BLOOD: 28 MM/HR (ref 0–20)
GLOBULIN SER CALC-MCNC: 4.3 G/DL (ref 2–4)
GLUCOSE SERPL-MCNC: 89 MG/DL (ref 65–100)
GLUCOSE UR STRIP.AUTO-MCNC: NEGATIVE MG/DL
HCT VFR BLD AUTO: 41 % (ref 35–47)
HGB BLD-MCNC: 14 G/DL (ref 11.5–16)
HGB UR QL STRIP: NEGATIVE
HYALINE CASTS URNS QL MICRO: NORMAL /LPF (ref 0–5)
KETONES UR QL STRIP.AUTO: NEGATIVE MG/DL
LEUKOCYTE ESTERASE UR QL STRIP.AUTO: NEGATIVE
LYMPHOCYTES # BLD: 2.2 K/UL (ref 0.8–3.5)
LYMPHOCYTES NFR BLD: 22 % (ref 12–49)
MCH RBC QN AUTO: 31.4 PG (ref 26–34)
MCHC RBC AUTO-ENTMCNC: 34.1 G/DL (ref 30–36.5)
MCV RBC AUTO: 91.9 FL (ref 80–99)
MONOCYTES # BLD: 0.5 K/UL (ref 0–1)
MONOCYTES NFR BLD: 5 % (ref 5–13)
NEUTS SEG # BLD: 6.8 K/UL (ref 1.8–8)
NEUTS SEG NFR BLD: 70 % (ref 32–75)
NITRITE UR QL STRIP.AUTO: NEGATIVE
PH UR STRIP: 7.5 [PH] (ref 5–8)
PLATELET # BLD AUTO: 253 K/UL (ref 150–400)
POTASSIUM SERPL-SCNC: 3.8 MMOL/L (ref 3.5–5.1)
PROT SERPL-MCNC: 8.3 G/DL (ref 6.4–8.2)
PROT UR STRIP-MCNC: NEGATIVE MG/DL
RBC # BLD AUTO: 4.46 M/UL (ref 3.8–5.2)
RBC #/AREA URNS HPF: NORMAL /HPF (ref 0–5)
SODIUM SERPL-SCNC: 141 MMOL/L (ref 136–145)
SP GR UR REFRACTOMETRY: 1.01 (ref 1–1.03)
UA: UC IF INDICATED,UAUC: NORMAL
UROBILINOGEN UR QL STRIP.AUTO: 0.2 EU/DL (ref 0.2–1)
WBC # BLD AUTO: 9.8 K/UL (ref 3.6–11)
WBC URNS QL MICRO: NORMAL /HPF (ref 0–4)

## 2017-11-10 PROCEDURE — 72158 MRI LUMBAR SPINE W/O & W/DYE: CPT

## 2017-11-10 PROCEDURE — 74011000258 HC RX REV CODE- 258: Performed by: FAMILY MEDICINE

## 2017-11-10 PROCEDURE — 93005 ELECTROCARDIOGRAM TRACING: CPT

## 2017-11-10 PROCEDURE — 87040 BLOOD CULTURE FOR BACTERIA: CPT | Performed by: EMERGENCY MEDICINE

## 2017-11-10 PROCEDURE — 80053 COMPREHEN METABOLIC PANEL: CPT | Performed by: EMERGENCY MEDICINE

## 2017-11-10 PROCEDURE — 36415 COLL VENOUS BLD VENIPUNCTURE: CPT | Performed by: EMERGENCY MEDICINE

## 2017-11-10 PROCEDURE — 85652 RBC SED RATE AUTOMATED: CPT | Performed by: EMERGENCY MEDICINE

## 2017-11-10 PROCEDURE — 99284 EMERGENCY DEPT VISIT MOD MDM: CPT

## 2017-11-10 PROCEDURE — 82550 ASSAY OF CK (CPK): CPT | Performed by: EMERGENCY MEDICINE

## 2017-11-10 PROCEDURE — 74011250636 HC RX REV CODE- 250/636: Performed by: FAMILY MEDICINE

## 2017-11-10 PROCEDURE — 74011250636 HC RX REV CODE- 250/636: Performed by: EMERGENCY MEDICINE

## 2017-11-10 PROCEDURE — 86140 C-REACTIVE PROTEIN: CPT | Performed by: EMERGENCY MEDICINE

## 2017-11-10 PROCEDURE — 81001 URINALYSIS AUTO W/SCOPE: CPT | Performed by: EMERGENCY MEDICINE

## 2017-11-10 PROCEDURE — 96374 THER/PROPH/DIAG INJ IV PUSH: CPT

## 2017-11-10 PROCEDURE — 85025 COMPLETE CBC W/AUTO DIFF WBC: CPT | Performed by: EMERGENCY MEDICINE

## 2017-11-10 PROCEDURE — 65270000029 HC RM PRIVATE

## 2017-11-10 PROCEDURE — A9576 INJ PROHANCE MULTIPACK: HCPCS | Performed by: EMERGENCY MEDICINE

## 2017-11-10 PROCEDURE — 74011250637 HC RX REV CODE- 250/637: Performed by: FAMILY MEDICINE

## 2017-11-10 RX ORDER — ALPRAZOLAM 0.25 MG/1
0.25 TABLET ORAL
Status: DISCONTINUED | OUTPATIENT
Start: 2017-11-10 | End: 2017-11-13 | Stop reason: HOSPADM

## 2017-11-10 RX ORDER — SODIUM CHLORIDE 0.9 % (FLUSH) 0.9 %
5-10 SYRINGE (ML) INJECTION EVERY 8 HOURS
Status: DISCONTINUED | OUTPATIENT
Start: 2017-11-10 | End: 2017-11-13 | Stop reason: HOSPADM

## 2017-11-10 RX ORDER — TOPIRAMATE 25 MG/1
50 TABLET ORAL 2 TIMES DAILY
Status: DISCONTINUED | OUTPATIENT
Start: 2017-11-11 | End: 2017-11-13 | Stop reason: HOSPADM

## 2017-11-10 RX ORDER — ESCITALOPRAM OXALATE 10 MG/1
10 TABLET ORAL DAILY
Status: CANCELLED | OUTPATIENT
Start: 2017-11-11

## 2017-11-10 RX ORDER — MORPHINE SULFATE 2 MG/ML
1 INJECTION, SOLUTION INTRAMUSCULAR; INTRAVENOUS
Status: DISCONTINUED | OUTPATIENT
Start: 2017-11-10 | End: 2017-11-11

## 2017-11-10 RX ORDER — SODIUM CHLORIDE 0.9 % (FLUSH) 0.9 %
10 SYRINGE (ML) INJECTION
Status: COMPLETED | OUTPATIENT
Start: 2017-11-10 | End: 2017-11-10

## 2017-11-10 RX ORDER — SULFAMETHOXAZOLE AND TRIMETHOPRIM 800; 160 MG/1; MG/1
1 TABLET ORAL 2 TIMES DAILY
Qty: 30 TAB | Refills: 0 | Status: SHIPPED | OUTPATIENT
Start: 2017-11-10 | End: 2017-11-10

## 2017-11-10 RX ORDER — ACETAMINOPHEN 325 MG/1
650 TABLET ORAL
Status: DISCONTINUED | OUTPATIENT
Start: 2017-11-10 | End: 2017-11-13 | Stop reason: HOSPADM

## 2017-11-10 RX ORDER — SODIUM CHLORIDE 0.9 % (FLUSH) 0.9 %
5-10 SYRINGE (ML) INJECTION AS NEEDED
Status: DISCONTINUED | OUTPATIENT
Start: 2017-11-10 | End: 2017-11-13 | Stop reason: HOSPADM

## 2017-11-10 RX ORDER — MORPHINE SULFATE 10 MG/ML
6 INJECTION, SOLUTION INTRAMUSCULAR; INTRAVENOUS
Status: COMPLETED | OUTPATIENT
Start: 2017-11-10 | End: 2017-11-10

## 2017-11-10 RX ORDER — SODIUM CHLORIDE 9 MG/ML
50 INJECTION, SOLUTION INTRAVENOUS CONTINUOUS
Status: DISCONTINUED | OUTPATIENT
Start: 2017-11-10 | End: 2017-11-13 | Stop reason: HOSPADM

## 2017-11-10 RX ORDER — GABAPENTIN 100 MG/1
200 CAPSULE ORAL 3 TIMES DAILY
Status: DISCONTINUED | OUTPATIENT
Start: 2017-11-10 | End: 2017-11-13 | Stop reason: HOSPADM

## 2017-11-10 RX ORDER — HYDROXYCHLOROQUINE SULFATE 200 MG/1
200 TABLET, FILM COATED ORAL 2 TIMES DAILY WITH MEALS
Status: DISCONTINUED | OUTPATIENT
Start: 2017-11-11 | End: 2017-11-13 | Stop reason: HOSPADM

## 2017-11-10 RX ORDER — BUPROPION HYDROCHLORIDE 150 MG/1
150 TABLET, EXTENDED RELEASE ORAL 2 TIMES DAILY
Status: CANCELLED | OUTPATIENT
Start: 2017-11-11

## 2017-11-10 RX ADMIN — Medication 10 ML: at 21:00

## 2017-11-10 RX ADMIN — MORPHINE SULFATE 1 MG: 2 INJECTION, SOLUTION INTRAMUSCULAR; INTRAVENOUS at 23:45

## 2017-11-10 RX ADMIN — GADOTERIDOL 20 ML: 279.3 INJECTION, SOLUTION INTRAVENOUS at 20:59

## 2017-11-10 RX ADMIN — DAPTOMYCIN 750 MG: 500 INJECTION, POWDER, LYOPHILIZED, FOR SOLUTION INTRAVENOUS at 23:53

## 2017-11-10 RX ADMIN — SODIUM CHLORIDE 50 ML/HR: 900 INJECTION, SOLUTION INTRAVENOUS at 23:46

## 2017-11-10 RX ADMIN — MORPHINE SULFATE 6 MG: 10 INJECTION, SOLUTION INTRAMUSCULAR; INTRAVENOUS at 20:08

## 2017-11-10 RX ADMIN — SODIUM CHLORIDE 1000 ML: 900 INJECTION, SOLUTION INTRAVENOUS at 20:08

## 2017-11-10 RX ADMIN — GABAPENTIN 200 MG: 100 CAPSULE ORAL at 23:45

## 2017-11-10 NOTE — ED PROVIDER NOTES
HPI Comments: 27 y.o. female with past medical history significant for lupus who presents from home via private vehicle with chief complaint of back pain. Pt reports she had a laminectomy about 2 months ago after osteomyelitis in her spine. Pt states she finished IV abx one week ago. Pt reports sudden onset worsening right-sided lower back pain since yesterday. Pt reports the pain is in the same spot as before the surgery, but does not have the sciatica pain as it did before. Pt reports measuring a temperature of 99.2 earlier today. Pt reports taking Tylenol earlier today with no relief. Pt denies any nausea, vomiting, or changes in urinary or bowel movements. There are no other acute medical concerns at this time. Social hx: Former smoker; Occasional EtOH use  PCP: Diana Looney MD    Old Chart Review: Pt had a laminectomy L3-L4 and drainage of a MRSA epidural abscess on 17 from osteomyelitis by Dr. Shadi Cuevas. Pt was sent home on vancomycin, but switched to IV daptomysin on 10/11/17 after she developed a papular rash. Pt finished IV Abx one week ago, not on any since. Note written by Jefe Choi, as dictated by Willi Ruiz MD 7:08 PM      The history is provided by the patient and medical records. No  was used. 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      Social History Main Topics  Smoking status: Former Smoker    Smokeless tobacco: Never Used    Alcohol use Yes      Comment: occasional    Drug use: No    Sexual activity: Not on file      Comment: richard has 2 children     Other Topics Concern    Not on file     Social History Narrative         ALLERGIES: Iodine and Vancomycin    Review of Systems   Constitutional: Negative for chills and fever. HENT: Negative for rhinorrhea and sore throat. Respiratory: Negative for cough and shortness of breath. Cardiovascular: Negative for chest pain. Gastrointestinal: Negative for abdominal pain, diarrhea, nausea and vomiting. Genitourinary: Negative for dysuria and urgency. Musculoskeletal: Positive for back pain. Negative for arthralgias. Skin: Negative for rash. Neurological: Negative for dizziness, weakness and light-headedness. All other systems reviewed and are negative. Vitals:    11/10/17 1759   BP: (!) 134/92   Pulse: (!) 121   Resp: 16   Temp: 98.8 °F (37.1 °C)   SpO2: 100%   Weight: 101.2 kg (223 lb)   Height: 5' 6\" (1.676 m)            Physical Exam   Vital signs reviewed. Nursing notes reviewed. Const:  Well developed, well nourished (Appears uncomfortable)  Head:  Atraumatic, normocephalic  Eyes:  PERRL, conjunctiva normal, no scleral icterus  Neck:  Supple, trachea midline  Cardiovascular:  RRR, no murmurs, no gallops, no rubs  Resp:  No resp distress, no increased work of breathing, no wheezes, no rhonchi, no rales,  Abd:  Soft, non-tender, non-distended, no rebound, no guarding, no CVA tenderness  :  Deferred  MSK:  No pedal edema, normal ROM (Minimal right-sided lumbar spine tenderness)  Neuro:  Alert and oriented x3, no cranial nerve defect  Skin:  Warm, dry, intact (Well-healed surgical scar)  Psych: normal mood and affect, behavior is normal, judgement and thought content is normal    Note written by Bree Parekh, as dictated by Uzma Kevin MD 7:08 PM      MDM  Number of Diagnoses or Management Options  Spinal abscess [facility]):      Amount and/or Complexity of Data Reviewed  Clinical lab tests: ordered and reviewed  Tests in the radiology section of CPT®: ordered and reviewed  Review and summarize past medical records: yes    Patient Progress  Patient progress: stable    ED Course     Pt. Presents to the ER with back pain. ESR and CRP are elevated. MRI shows a possible abscess. I have spoken with NSGY and ID. I have given ID's recs to the hospitalist, who will order her abx. Pt. To be admitted by the hospitalist for further care.       Procedures

## 2017-11-10 NOTE — IP AVS SNAPSHOT
[address] [facility] [address] [place] [address] 
[phone] Patient: Arjun Canas MRN: [id number] EYF:[birth year] About your hospitalization You were admitted on:  November 10, 2017 You last received care in the:  [address] [place] [address] You were discharged on:  November 13, 2017 Why you were hospitalized Your primary diagnosis was:  Spinal Abscess (Hcc) Things You Need To Do (next 8 weeks) Follow up with [facility] [address] Phone:  [phone] Where:  [address] [facility] [address] Follow up with [address] [place] [address] Phone:  [phone] Where:  [address] [facility], [address] [facility] [address] Follow up with Melissa Alvarez MD in 1 week(s) Discharge follow up Phone:  [phone] Where:  [facility], [address] [place] [address] Follow up with Wilfredo Breen MD  
Follow up for discharge follow up in 2 weeks Phone:  [phone] Where:  [address] [facility], [address] [facility], [address] [place] [address]  Internal Medicine , [facility] 7 [address] Tuesday Nov 14, 2017 START OF CARE with Deb Berry RN at  9:00 AM  
Where:  [address] ([address] [facility]) Discharge Orders None A check bailey indicates which time of day the medication should be taken. My Medications STOP taking these medications   
 trimethoprim-sulfamethoxazole 160-800 mg per tablet Commonly known as:  BACTRIM DS, SEPTRA DS  
   
  
  
TAKE these medications as instructed Instructions Each Dose to Equal  
 Morning Noon Evening Bedtime buPROPion  mg SR tablet Commonly known as:  Liana Bogaert Your last dose was: Your next dose is: Take 150 mg by mouth two (2) times a day. 150 mg  
    
   
   
   
  
 cyclobenzaprine 10 mg tablet Commonly known as:  FLEXERIL Your last dose was: Your next dose is: Take 1 Tab by mouth three (3) times daily as needed for Muscle Spasm(s). 10 mg  
    
   
   
   
  
 gabapentin 100 mg capsule Commonly known as:  NEURONTIN Your last dose was: Your next dose is: Take 2 Caps by mouth three (3) times daily. 200 mg  
    
   
   
   
  
 hydroxychloroquine 200 mg tablet Commonly known as:  PLAQUENIL Your last dose was: Your next dose is: Take 200 mg by mouth two (2) times a day. 200 mg  
    
   
   
   
  
 L. acidoph & paracasei- S therm- Bifido 8 billion cell Cap cap Commonly known as:  ELLEN-Q/RISAQUAD Your last dose was: Your next dose is: Take 1 Cap by mouth daily. 1 Cap LEXAPRO 10 mg tablet Generic drug:  escitalopram oxalate Your last dose was: Your next dose is: Take 10 mg by mouth daily. 10 mg  
    
   
   
   
  
 oxyCODONE-acetaminophen 5-325 mg per tablet Commonly known as:  PERCOCET Your last dose was: Your next dose is: Take 1-2 Tabs by mouth every six (6) hours as needed. Max Daily Amount: 8 Tabs. 1-2 Tab  
    
   
   
   
  
 topiramate 50 mg tablet Commonly known as:  TOPAMAX Your last dose was: Your next dose is: Take 50 mg by mouth two (2) times a day. 50 mg Where to Get Your Medications Information on where to get these meds will be given to you by the nurse or doctor. ! Ask your nurse or doctor about these medications  
  oxyCODONE-acetaminophen 5-325 mg per tablet Discharge Instructions Please bring this form with you to show your primary care provider at your follow-up appointment.  
 
Primary care provider:  Dr. Kimberly Pearl MD 
 
Discharging provider:  Radha Blank MD 
 
 You have been admitted to the hospital with the following diagnoses: 
· Spinal abscess ([address] FOLLOW-UP CARE RECOMMENDATIONS: 
 
APPOINTMENTS: 
Follow-up Information Follow up With Details Comments Contact Info [facility] [address] [facility] [address] [phone] [address] [facility] [address]   [address] 
[address]st Floor [place] [address] 
[phone] Clair Sahni MD In 1 week Discharge follow up  [address] [facility] [address] [address] 100[address] [facility] 
[phone] Eros Morton MD  Follow up for discharge follow up in 2 weeks [address] [facility] [address] [facility] Internal Medicine [address] 
[phone] FOLLOW-UP TESTS recommended: NONE PENDING TEST RESULTS: 
At the time of your discharge the following test results are still pending: NONE Please make sure you review these results with your outpatient follow-up provider(s). SYMPTOMS to watch for: chest pain, shortness of breath, fever, chills, nausea, vomiting, diarrhea, change in mentation, falling, weakness, bleeding. DIET/what to eat:  Regular Diet ACTIVITY:  Activity as tolerated WOUND CARE: none EQUIPMENT needed:  None What to do if new or unexpected symptoms occur? If you experience any of the above symptoms (or should other concerns or questions arise after discharge) please call your primary care physician. Return to the emergency room if you cannot get hold of your doctor. · It is very important that you keep your follow-up appointment(s). · Please bring discharge papers, medication list (and/or medication bottles) to your follow-up appointments for review by your outpatient provider(s). · Please check the list of medications and be sure it includes every medication (even non-prescription medications) that your provider wants you to take. · It is important that you take the medication exactly as they are prescribed. · Keep your medication in the bottles provided by the pharmacist and keep a list of the medication names, dosages, and times to be taken in your wallet. · Do not take other medications without consulting your doctor. · If you have any questions about your medications or other instructions, please talk to your nurse or care provider before you leave the hospital. 
 
I understand that if any problems occur once I am at home I am to contact my physician. These instructions were explained to me and I had the opportunity to ask questions. Connectipity Announcement We are excited to announce that we are making your provider's discharge notes available to you in Connectipity. You will see these notes when they are completed and signed by the physician that discharged you from your recent hospital stay. If you have any questions or concerns about any information you see in Connectipity, please call the Health Information Department where you were seen or reach out to your Primary Care Provider for more information about your plan of care. Introducing [facility]! Dear Dee Dee Acosta: Thank you for requesting a Connectipity account. Our records indicate that you already have an active Connectipity account. You can access your account anytime at [email or web address] Did you know that you can access your hospital and ER discharge instructions at any time in Connectipity? You can also review all of your test results from your hospital stay or ER visit. Additional Information If you have questions, please visit the Frequently Asked Questions section of the Connectipity website at [email or web address] Remember, Connectipity is NOT to be used for urgent needs. For medical emergencies, dial 911. Now available from your iPhone and Android! Unresulted Labs-Please follow up with your PCP about these lab tests Order Current Status CULTURE, ANAEROBIC In process CULTURE, FUNGUS In process PROCALCITONIN In process CULTURE, BLOOD, PAIRED Preliminary result CULTURE, WOUND W GRAM STAIN Preliminary result Providers Seen During Your Hospitalization Provider Specialty Primary office phone Baudilio Oro MD Emergency Medicine [phone] Liv Santana MD Hospitalist [phone] Leticia Sam MD Internal Medicine [phone] Your Primary Care Physician (PCP) Primary Care Physician Office Phone Office Fax Odalys Zelaya 8 [phone] You are allergic to the following Allergen Reactions Iodine Anaphylaxis Vancomycin Rash Recent Documentation Height Weight Breastfeeding? BMI OB Status Smoking Status 1.676 m 101.2 kg No 35.99 kg/m2 IUD Former Smoker Emergency Contacts Name Discharge Info Relation Home Work Mobile [facility] DISCHARGE CAREGIVER [3] Mother [14] [phone] Patient Belongings The following personal items are in your possession at time of discharge: 
  Dental Appliances: None  Visual Aid: Glasses, With patient      Home Medications: None      Clothing: At bedside    Other Valuables: Cell Phone, Willis Please provide this summary of care documentation to your next provider. Signatures-by signing, you are acknowledging that this After Visit Summary has been reviewed with you and you have received a copy. Patient Signature:  ____________________________________________________________ Date:  ____________________________________________________________  
  
Mathew Turk Provider Signature:  ____________________________________________________________ Date:  ____________________________________________________________

## 2017-11-10 NOTE — ED TRIAGE NOTES
S/P laminectomy on 9/19/17 by Dr. Nery Sanders. \"I completed the antibiotic last week. Yesterday, my back started hurting again\".

## 2017-11-10 NOTE — IP AVS SNAPSHOT
[address] [facility] [address] [facility] 
[phone] Patient: Florentino Velez MRN: [id number] CAT:8/6/8362 My Medications STOP taking these medications   
 trimethoprim-sulfamethoxazole 160-800 mg per tablet Commonly known as:  BACTRIM DS, SEPTRA DS  
   
  
  
TAKE these medications as instructed Instructions Each Dose to Equal  
 Morning Noon Evening Bedtime buPROPion  mg SR tablet Commonly known as:  Evaline Barajas Your last dose was: Your next dose is: Take 150 mg by mouth two (2) times a day. 150 mg  
    
   
   
   
  
 cyclobenzaprine 10 mg tablet Commonly known as:  FLEXERIL Your last dose was: Your next dose is: Take 1 Tab by mouth three (3) times daily as needed for Muscle Spasm(s). 10 mg  
    
   
   
   
  
 gabapentin 100 mg capsule Commonly known as:  NEURONTIN Your last dose was: Your next dose is: Take 2 Caps by mouth three (3) times daily. 200 mg  
    
   
   
   
  
 hydroxychloroquine 200 mg tablet Commonly known as:  PLAQUENIL Your last dose was: Your next dose is: Take 200 mg by mouth two (2) times a day. 200 mg  
    
   
   
   
  
 L. acidoph & paracasei- S therm- Bifido 8 billion cell Cap cap Commonly known as:  ELLEN-Q/RISAQUAD Your last dose was: Your next dose is: Take 1 Cap by mouth daily. 1 Cap LEXAPRO 10 mg tablet Generic drug:  escitalopram oxalate Your last dose was: Your next dose is: Take 10 mg by mouth daily. 10 mg  
    
   
   
   
  
 oxyCODONE-acetaminophen 5-325 mg per tablet Commonly known as:  PERCOCET Your last dose was: Your next dose is: Take 1-2 Tabs by mouth every six (6) hours as needed. Max Daily Amount: 8 Tabs. 1-2 Tab topiramate 50 mg tablet Commonly known as:  TOPAMAX Your last dose was: Your next dose is: Take 50 mg by mouth two (2) times a day. 50 mg Where to Get Your Medications Information on where to get these meds will be given to you by the nurse or doctor. ! Ask your nurse or doctor about these medications  
  oxyCODONE-acetaminophen 5-325 mg per tablet

## 2017-11-10 NOTE — PROGRESS NOTES
Pt finished 6 weeks of IV antibiotic for MRSA epidural abscess last Friday- ESR normalized ( was 3 )  She called today and c/o back pain which started yesterday- Asked her to go to [facility] ED if it gets worse over the weekend for MRI.  If not she will see me next week- She has no fever ( temp 99.2) Meanwhile sent prescription for BActrim DS 2 BID

## 2017-11-11 LAB
ANION GAP SERPL CALC-SCNC: 11 MMOL/L (ref 5–15)
BASOPHILS # BLD: 0 K/UL (ref 0–0.1)
BASOPHILS NFR BLD: 1 % (ref 0–1)
BUN SERPL-MCNC: 8 MG/DL (ref 6–20)
BUN/CREAT SERPL: 8 (ref 12–20)
CALCIUM SERPL-MCNC: 8.1 MG/DL (ref 8.5–10.1)
CHLORIDE SERPL-SCNC: 108 MMOL/L (ref 97–108)
CK SERPL-CCNC: 43 U/L (ref 26–192)
CO2 SERPL-SCNC: 19 MMOL/L (ref 21–32)
CREAT SERPL-MCNC: 0.96 MG/DL (ref 0.55–1.02)
EOSINOPHIL # BLD: 0.3 K/UL (ref 0–0.4)
EOSINOPHIL NFR BLD: 4 % (ref 0–7)
ERYTHROCYTE [DISTWIDTH] IN BLOOD BY AUTOMATED COUNT: 13.7 % (ref 11.5–14.5)
GLUCOSE SERPL-MCNC: 93 MG/DL (ref 65–100)
HCT VFR BLD AUTO: 38.2 % (ref 35–47)
HGB BLD-MCNC: 12.8 G/DL (ref 11.5–16)
LYMPHOCYTES # BLD: 1.1 K/UL (ref 0.8–3.5)
LYMPHOCYTES NFR BLD: 16 % (ref 12–49)
MCH RBC QN AUTO: 30.7 PG (ref 26–34)
MCHC RBC AUTO-ENTMCNC: 33.5 G/DL (ref 30–36.5)
MCV RBC AUTO: 91.6 FL (ref 80–99)
MONOCYTES # BLD: 0.6 K/UL (ref 0–1)
MONOCYTES NFR BLD: 9 % (ref 5–13)
NEUTS SEG # BLD: 5 K/UL (ref 1.8–8)
NEUTS SEG NFR BLD: 70 % (ref 32–75)
PLATELET # BLD AUTO: 209 K/UL (ref 150–400)
POTASSIUM SERPL-SCNC: 3.8 MMOL/L (ref 3.5–5.1)
RBC # BLD AUTO: 4.17 M/UL (ref 3.8–5.2)
SODIUM SERPL-SCNC: 138 MMOL/L (ref 136–145)
TSH SERPL DL<=0.05 MIU/L-ACNC: 1.84 UIU/ML (ref 0.36–3.74)
WBC # BLD AUTO: 7.1 K/UL (ref 3.6–11)

## 2017-11-11 PROCEDURE — 74011250637 HC RX REV CODE- 250/637: Performed by: HOSPITALIST

## 2017-11-11 PROCEDURE — 85025 COMPLETE CBC W/AUTO DIFF WBC: CPT | Performed by: FAMILY MEDICINE

## 2017-11-11 PROCEDURE — 74011250637 HC RX REV CODE- 250/637: Performed by: FAMILY MEDICINE

## 2017-11-11 PROCEDURE — 74011000258 HC RX REV CODE- 258: Performed by: FAMILY MEDICINE

## 2017-11-11 PROCEDURE — 82550 ASSAY OF CK (CPK): CPT | Performed by: FAMILY MEDICINE

## 2017-11-11 PROCEDURE — 74011250636 HC RX REV CODE- 250/636: Performed by: FAMILY MEDICINE

## 2017-11-11 PROCEDURE — 80048 BASIC METABOLIC PNL TOTAL CA: CPT | Performed by: FAMILY MEDICINE

## 2017-11-11 PROCEDURE — 84443 ASSAY THYROID STIM HORMONE: CPT | Performed by: HOSPITALIST

## 2017-11-11 PROCEDURE — 36415 COLL VENOUS BLD VENIPUNCTURE: CPT | Performed by: FAMILY MEDICINE

## 2017-11-11 PROCEDURE — 86038 ANTINUCLEAR ANTIBODIES: CPT | Performed by: HOSPITALIST

## 2017-11-11 PROCEDURE — 65270000029 HC RM PRIVATE

## 2017-11-11 RX ORDER — OXYCODONE AND ACETAMINOPHEN 5; 325 MG/1; MG/1
1-2 TABLET ORAL
Status: DISCONTINUED | OUTPATIENT
Start: 2017-11-11 | End: 2017-11-13 | Stop reason: HOSPADM

## 2017-11-11 RX ORDER — POLYETHYLENE GLYCOL 3350 17 G/17G
17 POWDER, FOR SOLUTION ORAL DAILY
Status: DISCONTINUED | OUTPATIENT
Start: 2017-11-11 | End: 2017-11-13 | Stop reason: HOSPADM

## 2017-11-11 RX ORDER — ESCITALOPRAM OXALATE 10 MG/1
10 TABLET ORAL DAILY
Status: DISCONTINUED | OUTPATIENT
Start: 2017-11-11 | End: 2017-11-13 | Stop reason: HOSPADM

## 2017-11-11 RX ADMIN — HYDROXYCHLOROQUINE SULFATE 200 MG: 200 TABLET, FILM COATED ORAL at 17:30

## 2017-11-11 RX ADMIN — SODIUM CHLORIDE 50 ML/HR: 900 INJECTION, SOLUTION INTRAVENOUS at 22:54

## 2017-11-11 RX ADMIN — HYDROXYCHLOROQUINE SULFATE 200 MG: 200 TABLET, FILM COATED ORAL at 07:25

## 2017-11-11 RX ADMIN — ESCITALOPRAM OXALATE 10 MG: 10 TABLET ORAL at 17:48

## 2017-11-11 RX ADMIN — Medication 10 ML: at 22:54

## 2017-11-11 RX ADMIN — CEFTAROLINE FOSAMIL 600 MG: 600 POWDER, FOR SOLUTION INTRAVENOUS at 00:58

## 2017-11-11 RX ADMIN — GABAPENTIN 200 MG: 100 CAPSULE ORAL at 08:51

## 2017-11-11 RX ADMIN — Medication 10 ML: at 07:26

## 2017-11-11 RX ADMIN — GABAPENTIN 200 MG: 100 CAPSULE ORAL at 21:52

## 2017-11-11 RX ADMIN — TOPIRAMATE 50 MG: 25 TABLET, FILM COATED ORAL at 08:51

## 2017-11-11 RX ADMIN — TOPIRAMATE 50 MG: 25 TABLET, FILM COATED ORAL at 17:30

## 2017-11-11 RX ADMIN — Medication 1 CAPSULE: at 08:51

## 2017-11-11 RX ADMIN — OXYCODONE HYDROCHLORIDE AND ACETAMINOPHEN 2 TABLET: 5; 325 TABLET ORAL at 15:20

## 2017-11-11 RX ADMIN — GABAPENTIN 200 MG: 100 CAPSULE ORAL at 15:20

## 2017-11-11 RX ADMIN — CEFTAROLINE FOSAMIL 600 MG: 600 POWDER, FOR SOLUTION INTRAVENOUS at 23:38

## 2017-11-11 RX ADMIN — DAPTOMYCIN 750 MG: 500 INJECTION, POWDER, LYOPHILIZED, FOR SOLUTION INTRAVENOUS at 17:30

## 2017-11-11 RX ADMIN — POLYETHYLENE GLYCOL 3350 17 G: 17 POWDER, FOR SOLUTION ORAL at 08:51

## 2017-11-11 RX ADMIN — OXYCODONE HYDROCHLORIDE AND ACETAMINOPHEN 2 TABLET: 5; 325 TABLET ORAL at 21:52

## 2017-11-11 RX ADMIN — OXYCODONE HYDROCHLORIDE AND ACETAMINOPHEN 1 TABLET: 5; 325 TABLET ORAL at 08:51

## 2017-11-11 RX ADMIN — CEFTAROLINE FOSAMIL 600 MG: 600 POWDER, FOR SOLUTION INTRAVENOUS at 07:26

## 2017-11-11 RX ADMIN — CEFTAROLINE FOSAMIL 600 MG: 600 POWDER, FOR SOLUTION INTRAVENOUS at 15:21

## 2017-11-11 RX ADMIN — Medication 10 ML: at 13:41

## 2017-11-11 NOTE — PROGRESS NOTES
Spiritual Care Assessment/Progress Notes    Katrin Bradley [id number]  [id number]    1985  27 y.o.  female    Patient Telephone Number: [phone] (home)   Methodist Affiliation: No preference   Language: English   Extended Emergency Contact Information  Primary Emergency Contact: [address] [facility] Phone: [phone]  Mobile Phone: [phone]  Relation: Mother   Patient Active Problem List    Diagnosis Date Noted    Spinal abscess ([facility] [address] 11/10/2017    Hypokalemia 09/18/2017    Intractable back pain 09/18/2017    SIRS due to infectious process without acute organ dysfunction ([facility] [address] 09/18/2017    Obesity (BMI 30-39.9) 09/18/2017    Bacteriuria 09/18/2017    PVC's (premature ventricular contractions) 05/15/2015    Palpitations 05/15/2015    Lupus 05/15/2015        Date: 11/11/2017       Level of Methodist/Spiritual Activity:  []         Involved in arnaldo tradition/spiritual practice    []         Not involved in anraldo tradition/spiritual practice  []         Spiritually oriented    []         Claims no spiritual orientation    []         seeking spiritual identity  []         Feels alienated from Rastafarian practice/tradition  []         Feels angry about Rastafarian practice/tradition  [x]         Spirituality/Rastafarian tradition does not seem to be a resource for coping at this time.   []         Not able to assess due to medical condition    Services Provided Today:  []         crisis intervention    []         reading Scriptures  [x]         spiritual assessment    []         prayer  [x]         empathic listening/emotional support  []         rites and rituals (cite in comments)  [x]         life review     []         Rastafarian support  []         theological development   []         advocacy  []         ethical dialog     []         blessing  []         bereavement support    []         support to family  []         anticipatory grief support   [x]         help with AMD  []         spiritual guidance    []         meditation      Spiritual Care Needs  []         Emotional Support  []         Spiritual/Voodoo Care  []         Loss/Adjustment  []         Advocacy/Referral                /Ethics  [x]         No needs expressed at               this time  []         Other: (note in               comments)  [address]  []         Follow up visits with               pt/family  []         Provide materials  []         Schedule sacraments  []         Contact Community               Clergy  [x]         Follow up as needed  []         Other: (note in               comments)     Provided support to this pt in [facility]. Pt welcomed [address] [place] [address] support. [address] [place] [address] introduced self and explained role in care team as well as purpose of this visit. Offered assistance to pt as a result of AMD consult. Pt wasn't interested in completing AMD at this time. Provided pt with AMD and \"Your Right to Decide Booklet\" as well as a Pastoral Care Card for contact information in the event assistance is needed. [address] [place] [address] engaged pt in life review and offered listening presence. Pt explained her family dynamic and shared why she wanted to complete and AMD.  Pt shared she wanted to time to review it herself before completing it. Advised pt of chaplains' availability to offer assistance with completing AMD when ready. No further needs being expressed, CH assured pt of prayer and affirmed ongoing availability of support.

## 2017-11-11 NOTE — PROGRESS NOTES
Problem: Falls - Risk of  Goal: *Absence of Falls  Document Brandin Fall Risk and appropriate interventions in the flowsheet.   Outcome: Progressing Towards Goal  Fall Risk Interventions:

## 2017-11-11 NOTE — PROGRESS NOTES
Consult received, chart reviewed, pt cleared by nursing who reports that pt is up ad zaina with no noted mobility issues. PT will complete order but please reconsult if there is a decline in pt status.  Thank you, Mahesh Mckeon, PT

## 2017-11-11 NOTE — PROGRESS NOTES
Admission Medication Reconciliation:    Information obtained from: Patient/Rx Query    Significant PMH/Disease States:   Past Medical History:   Diagnosis Date    Abscess in epidural space of lumbar spine     Lupus     PVC's (premature ventricular contractions)        Chief Complaint for this Admission:  Back pain/post-op complication    Allergies:  Iodine and Vancomycin    Prior to Admission Medications:   Prior to Admission Medications   Prescriptions Last Dose Informant Patient Reported? Taking? L. acidoph & paracasei- S therm- Bifido (ELLEN-Q/RISAQUAD) 8 billion cell cap cap 11/10/2017 at Unknown time  No Yes   Sig: Take 1 Cap by mouth daily. buPROPion SR (WELLBUTRIN SR) 150 mg SR tablet 11/10/2017 at Unknown time  Yes Yes   Sig: Take 150 mg by mouth two (2) times a day. cyclobenzaprine (FLEXERIL) 10 mg tablet 11/10/2017 at Unknown time  No Yes   Sig: Take 1 Tab by mouth three (3) times daily as needed for Muscle Spasm(s). escitalopram oxalate (LEXAPRO) 10 mg tablet 11/10/2017 at Unknown time  Yes Yes   Sig: Take 10 mg by mouth daily. gabapentin (NEURONTIN) 100 mg capsule 11/10/2017 at Unknown time  No Yes   Sig: Take 2 Caps by mouth three (3) times daily. hydroxychloroquine (PLAQUENIL) 200 mg tablet 11/10/2017 at Unknown time  Yes Yes   Sig: Take 200 mg by mouth two (2) times a day. topiramate (TOPAMAX) 50 mg tablet 11/10/2017 at Unknown time  Yes Yes   Sig: Take 50 mg by mouth two (2) times a day. trimethoprim-sulfamethoxazole (BACTRIM DS, SEPTRA DS) 160-800 mg per tablet 11/10/2017 at Unknown time  No Yes   Sig: Take 1 Tab by mouth two (2) times a day. Patient taking differently: Take 2 Tabs by mouth two (2) times a day.       Facility-Administered Medications: None         Comments/Recommendations:   (1) Added: Plaquenil dose/freq  (2) Changed: Bactrim dose - Patient just starting taking Bactrim Rx: 2 DS tabs PO BID per ID MD  (3) Removed: hydroxyzine, mupirocin, Miralax, senna-docusate

## 2017-11-11 NOTE — H&P
[address] [facility] [address]   HISTORY AND PHYSICAL       Name:  Brice Arreaga   MR#:  [id number]   :  1985   Account #:  [de-identified]        Date of Adm:  11/10/2017       CHIEF COMPLAINT: Back pain. HISTORY OF PRESENT ILLNESS: The patient is a 27-year-old   female with past medical history of lupus, who presents to the hospital   with the above mentioned symptoms. The patient has recent history of   MRSA bacteremia and epidural abscess for which she was admitted to   [facility]. The patient had an MRI done which revealed   extensive epidural abscess from the L2 level to the mid L5 in the   posterior right spinal canal, likely originating from inflammatory   changes in the right L5-S1 facet joint. There was also a small 1 cm   source collection on the right side. The patient underwent a   laminectomy, L3-L4 and drainage of the epidural abscess. She stayed   in the hospital between 2017 to 2017 and was sent home   on vancomycin 1500 mg q.8h until 2017. She was doing well   until 10/11/2017 when she developed a papular rash following   vancomycin dose. She went to the urgent care and got a short course   of steroids. Vancomycin was discontinued and she was started on   daptomycin. She has been doing fine since then. The patient reports   that she finished her daptomycin infusion about a week back and since   yesterday, started experiencing increased lower back pain. The patient   reports that the pain feels similar to previous time when she had the   epidural abscess. She also has pain while walking and reports that it is   very hard for her to lift her legs in a lying down position. The patient   reports that she had a temperature of 99.2 today and reports she took   some Tylenol today. The patient denies any bladder or bowel   incontinence. Denies any tingling or numbness in her legs.  Denies any   saddle anesthesia or denies any other complaints or problems. Denies   any headache, blurry vision, sore throat, trouble swallowing, trouble   with speech, any chest pain, shortness of breath, cough, fever, chills,   abdominal pain, constipation, diarrhea, urinary symptoms, focal or   generalized neurological weakness, recent travel, sick contacts, falls,   injuries, hematemesis, melena, hemoptysis or any other concerns or   problems. Of note, the patient is tearful and is frustrated from her   illness. PAST MEDICAL HISTORY: See above. HOME MEDICATIONS:   Currently the patient is on   1. Bactrim 200 mg b.i.d.   2. Flexeril 10 mg as needed. 3. Neurontin 200 mg b.i.d.   4. Probiotic. 5. Lexapro 10 mg daily. 6. Wellbutrin 150 mg b.i.d.   7. Topamax. SOCIAL HISTORY: The patient is a former smoker. Occasional   alcohol. Denies IV drug abuse. Lives at home. ALLERGIES:   1. IODINE. 2. VANCOMYCIN. FAMILY HISTORY: Mother and father both had history of diabetes. REVIEW OF SYSTEMS: All systems were reviewed and were found to   be essentially negative except for the symptoms mentioned above. PHYSICAL EXAMINATION   VITAL SIGNS: Temperature 98.7, pulse 112, respiratory rate 18, blood   pressure 118/73, pulse oximetry 99% on room air. GENERAL: Alert x3, awake, mildly distressed, pleasant female,   appears to be stated age. HEENT: Pupils equal and reactive to light. Dry mucous membranes. Tympanic membranes clear. NECK: Supple. CHEST: Clear to auscultation bilaterally. CORONARY: S1, S2 were heard. ABDOMEN: Soft, nontender, nondistended. Bowel sounds are   physiological.   EXTREMITIES: No clubbing, no cyanosis, no edema. NEUROPSYCHIATRIC: Pleasant mood and affect. Cranial nerves 2-  12 grossly intact. Sensory grossly within normal limits. DTR 2+/4. Strength 5/5 bilateral upper extremities. Patient has straight leg   positive on right greater than left.  Right is positive at 30 degrees, left is   positive at 45 degrees. For pain, the lower extremities at this time could   not be appropriately tested. DTR 2+/4. SKIN: Warm. MUSCULOSKELETAL: There is some paraspinal tenderness of the   lumbar spine. LABORATORY DATA: White count 9.8, hemoglobin 14, hematocrit 41,   platelets 045. Urine shows no signs of infection. Sodium 141,   potassium 3.8, chloride 106, bicarbonate 25, anion gap 10. Glucose   89, BUN 9, creatinine 0.83, calcium 9.4, bilirubin total 0.3. ALT 30, AST   40, alkaline phosphatase 116, C-reactive protein is 2.73. DIAGNOSTIC DATA: MRI of the lumbar spine shows no unusual   postoperative findings related to posterior laminectomy and treatment   of posterior epidural abscess residual finding of right L5-S1 facet   arthrosis with a small joint effusion, residual right facet joint infection   could not be excluded. Preliminary report was reported as   questionable abscess. ASSESSMENT AND PLAN:   1. Back pain. Concern for questionable spinal abscess versus infection   of the facet joint versus other etiology. The ER physician discussed the   case with Neurosurgery who recommends starting the patient on   intravenous antibiotics and having IR do a possible aspiration. Infectious Disease was consulted and they recommend starting the   patient on daptomycin and  Ceftaroline which have been initiated. Will   provide neurovascular checks, pain control, gentle IV hydration, and IR   consult. Further intervention will be per hospital course. Will continue   to monitor. Any changes in neurological status will mandate emergent   intervention. Continue to closely monitor. Await complete results from   MRI. Will continue to monitor. 2. Lupus. Continue home medications. Continue to monitor. 3. Anxiety. The patient on last EKG had some  prolongation. I will   hold Lexapro and  until an EKG is available and the patient does not   show any EKG changes.  I will start the patient on Xanax p.r.n. for   anxiety and may resume home medications later tonight or early in the   morning. Further intervention will be per hospital course. Reassess as   needed. 4. Gastrointestinal and deep venous thrombosis prophylaxis. The   patient will be on sequential compression devices.         Darek De Leon MD MM / Rosemary Vargas   D:  11/10/2017   22:08   T:  11/10/2017   23:55   Job #:  [id number]

## 2017-11-11 NOTE — PROGRESS NOTES
Neurosurgery  Had laminectomy for epidural abscess, just came off antibiotics. Worsening back pain, no sciatica like last episode. Motor 5/5 LE. Sensation intact. MRI shows probable abscess off facet joint on right. Is ~ 1.2x1.3 cm,     Would have IR do aspiration of the fluid collection.   I do not think she needs another surgery at this time, but will probably need long-term suppressive antibiotics    Will discuss with Dr. Osito Chun

## 2017-11-11 NOTE — PROGRESS NOTES
Hospitalist Progress Note  Angus Rivera MD  Answering service: [phone] from in house phone  Cell: [phone]      Date of Service:  2017  NAME:  Boom Infante  :  1985  MRN:  [id number]      Admission Summary:   26 yo female with PMHx of SLE, recent hx of MRSA bacteremia and epidural abscess from L2-L5, s/p laminectomy and L3-L4 drainage of epidural abscess, was hospitalized from -, discharged on Vanc until , was doing well until 10/11 when she developed Rash which was treated with steroids, and vanc discontinued and started on Daptomycin. Pt completed Daptomycin. Pt started having Rt sided lower back pain for 2 days. Pt called Abad Ventura who asked her to come to ER    Interval history / Subjective:     Pt seen and examined  Still c/o Rt Lower back pain  No n/v, fever, chills, weakness, numbness, tingling, abdominal pain noted.       Assessment & Plan:     Lumbar Back pain with Recent hx of MRSA bacteremia and Epidural abscess at L2-L5  -s/p Laminectomy and drainage of abscess  - s/p completion of IV abx  - MRI lumbar: possible L5/S1 facet arthrosis   - Restarted IV Abx per ID, appreciate Input  - Neurosurgery input pending  - BCx: NGTD  - may need 2 more weeks of IV Abx   - changed IV to PO pain meds  - Stool softners    Sinus tachy, chronic  - EKG: no significant changes or findings  - check TSH  - may see cardiology as outpt  - recent JOSE without vegetations    Lupus  - c/w Plaquenil    Migraines  - c/w Topamax    Code status: FULL  DVT prophylaxis: SCDs    Care Plan discussed with: Patient/Family, Nurse and Consultant Abad Ventura  Disposition: Home w/Family     Hospital Problems  Date Reviewed: 11/10/2017          Codes Class Noted POA    * (Principal)Spinal abscess ([facility] [address] ICD-10-CM: M46.20  ICD-9-CM: 730.08  11/10/2017 Unknown                Review of Systems:   A comprehensive review of systems was negative except for that written in the HPI. Vital Signs:    Last 24hrs VS reviewed since prior progress note. Most recent are:  Visit Vitals    /76 (BP 1 Location: Right arm, BP Patient Position: At rest)    Pulse (!) 101    Temp 98.5 °F (36.9 °C)    Resp 16    Ht 5' 6\" (1.676 m)    Wt 101.2 kg (223 lb)    SpO2 100%    Breastfeeding No    BMI 35.99 kg/m2       No intake or output data in the 24 hours ending 11/11/17 1022     Physical Examination:             Constitutional:  No acute distress, cooperative, pleasant    ENT:  Oral mucous moist, ,    Resp:  CTA bilaterally. CV:  Regular rhythm, normal rate,     GI:  Soft, non distended, non tender. normoactive bowel sounds,     Musculoskeletal  Back:  No edema, warm, 2+ pulses throughout  Tenderness to palpation Rt paraspinal region at lumbar    Neurologic:  Moves all extremities. AAOx3,      Skin:  low back surgical scar, healing well, no drainage       Data Review:    Review and/or order of clinical lab test  Review and/or order of tests in the radiology section of CPT  Review and/or order of tests in the medicine section of CPT      Labs:     Recent Labs      11/11/17   0440  11/10/17   1841   WBC  7.1  9.8   HGB  12.8  14.0   HCT  38.2  41.0   PLT  209  253     Recent Labs      11/11/17   0440  11/10/17   1841   NA  138  141   K  3.8  3.8   CL  108  106   CO2  19*  25   BUN  8  9   CREA  0.96  0.83   GLU  93  89   CA  8.1*  9.4     Recent Labs      11/10/17   1841   SGOT  14*   ALT  30   AP  116   TBILI  0.3   TP  8.3*   ALB  4.0   GLOB  4.3*     No results for input(s): INR, PTP, APTT in the last 72 hours. No lab exists for component: INREXT   No results for input(s): FE, TIBC, PSAT, FERR in the last 72 hours. No results found for: FOL, RBCF   No results for input(s): PH, PCO2, PO2 in the last 72 hours.   Recent Labs      11/11/17   0440  11/10/17   1841   CPK  43  63     No results found for: CHOL, CHOLX, CHLST, CHOLV, HDL, LDL, LDLC, [address], [facility], [facility]  Lab Results   Component Value Date/Time    Glucose (POC) 102 09/22/2017 06:57 AM     Lab Results   Component Value Date/Time    Color YELLOW/STRAW 11/10/2017 07:57 PM    Appearance CLEAR 11/10/2017 07:57 PM    Specific gravity 1.008 11/10/2017 07:57 PM    Specific gravity 1.024 03/20/2015 02:41 PM    pH (UA) 7.5 11/10/2017 07:57 PM    Protein NEGATIVE  11/10/2017 07:57 PM    Glucose NEGATIVE  11/10/2017 07:57 PM    Ketone NEGATIVE  11/10/2017 07:57 PM    Bilirubin NEGATIVE  11/10/2017 07:57 PM    Urobilinogen 0.2 11/10/2017 07:57 PM    Nitrites NEGATIVE  11/10/2017 07:57 PM    Leukocyte Esterase NEGATIVE  11/10/2017 07:57 PM    Epithelial cells FEW 11/10/2017 07:57 PM    Bacteria NEGATIVE  11/10/2017 07:57 PM    WBC 0-4 11/10/2017 07:57 PM    RBC 0-5 11/10/2017 07:57 PM         Medications Reviewed:     Current Facility-Administered Medications   Medication Dose Route Frequency    oxyCODONE-acetaminophen (PERCOCET) 5-325 mg per tablet 1-2 Tab  1-2 Tab Oral Q6H PRN    polyethylene glycol (MIRALAX) packet 17 g  17 g Oral DAILY    gabapentin (NEURONTIN) capsule 200 mg  200 mg Oral TID    hydroxychloroquine (PLAQUENIL) tablet 200 mg  200 mg Oral BID WITH MEALS    lactobac ac& pc-s.therm-b.anim (ELLEN Q/RISAQUAD)  1 Cap Oral DAILY    topiramate (TOPAMAX) tablet 50 mg  50 mg Oral BID    sodium chloride (NS) flush 5-10 mL  5-10 mL IntraVENous Q8H    sodium chloride (NS) flush 5-10 mL  5-10 mL IntraVENous PRN    0.9% sodium chloride infusion  50 mL/hr IntraVENous CONTINUOUS    acetaminophen (TYLENOL) tablet 650 mg  650 mg Oral Q4H PRN    ALPRAZolam (XANAX) tablet 0.25 mg  0.25 mg Oral BID PRN    DAPTOmycin (CUBICIN) 750 mg in 0.9% sodium chloride 50 mL IVPB RF formulation  750 mg IntraVENous Q24H    cefTARoline (TEFLARO) 600 mg in 0.9% sodium chloride (MBP/ADV) 50 mL  600 mg IntraVENous Q8H ______________________________________________________________________  EXPECTED LENGTH OF STAY: - - -  ACTUAL LENGTH OF STAY:          1                 Deb Payment, MD

## 2017-11-11 NOTE — ROUTINE PROCESS
TRANSFER - OUT REPORT:    Verbal report given to Olga Garcia RN(name) on Arjun Canas  being transferred to [facility](unit) for routine progression of care       Report consisted of patients Situation, Background, Assessment and   Recommendations(SBAR). Information from the following report(s) SBAR was reviewed with the receiving nurse. Lines:   Peripheral IV 11/10/17 Left Antecubital (Active)   Site Assessment Clean, dry, & intact 11/10/2017  8:09 PM   Phlebitis Assessment 0 11/10/2017  8:09 PM   Infiltration Assessment 0 11/10/2017  8:09 PM   Dressing Status Clean, dry, & intact 11/10/2017  8:09 PM   Dressing Type Transparent 11/10/2017  8:09 PM        Opportunity for questions and clarification was provided.

## 2017-11-11 NOTE — CONSULTS
ID Consult Note    NAME:  Leslie Mcclelland                    Requesting Provider                            :   1985                      DOA  MRN:   [id number]   Date/Time:  2017 9:08 AM  Subjective:   REASON FOR CONSULT:   MRSA spine infection      Leslie Mcclelland  is a 27 y. o.female with recent MRSA spine infection treated with 6 weeks of IV antibiotic admitted with worsening pain rt side of the lumbar area a week after finishing antibiotic. She was recently in [facility] for MRSA bacteremia and epidural abscess. MRI which revealed Extensive epidural abscess from the L2 level to the mid L5 in the posterior right spinal canal, likely originating from inflammatory changes in the right L5-S1 facet joint. and underwent  laminectomy l3-L4  and drainage of epidural abscess. She stayed in the hospital between -17 and was sent home on vancomycin 1500mg Q 8 until 17. She was doing well until 10/11 when she developed a papular rash following vancomycin dose.  she went to [facility] and got a short course of prednisone  VAnco was discontinued and she went on Daptomycin 750mg IV and tolerated it well. She saw neurosurgery as OP and was cleared to start PT  I saw her in my office on  and labs were sent and ESR was 4 ( it was 117 at the beginning) and antibiotic was discontinued and PICC removed- She also had sinus tachycardia while she was on antibiotics and she thought it was the PICC that was possibly the cause of it but it is still high inspite of removing it. A week after the antibiotic was Dc she had worsening pain on the rt side of her lower back- She had also stopped percocet along with antibiotic . She talked to me on 11/10/17 and I had sent a prescription for bactrim and asked her to go to the ED for evaluation  if the pain was worse   As the pain got worse she came to the ED.   A repeat MRI showed  No unusual postoperative findings related to posterior laminectomy and  treatment of posterior epidural abscess. 2.  residual findings of right L5-S1 facet arthrosis with small joint effusion. Residual right facet joint infection cannot be excluded. 3. No other significant focal fluid collection abscess or other unusual postoperative findings demonstrated. No fever or chills  She has lupus and is on plaquenil which she started 3 weeks ago after stopping for a few weeks after the first infection           Data  this hospitalization  Date  TMAX WBC other labs Intervention  Cultures ABX   11/10 N 9.8 ESR 28       N 7.1                                                                 Medical history- lupus  PVCs  migraine    PSH- csection  Lumbar laminectomy  Knee surgery  Tonsillectomy    SOCX-former smoker  No alcohol  Has 2 children    FAMHX- DM father and brother    ALLERGY  Vancomycin    Medications  gabapentim  topiramate  flexiril  xanax      REVIEW OF SYSTEMS:     Const:  negative fever, negative chills, negative weight loss  Eyes:   negative diplopia or visual changes, negative eye pain  ENT:   negative coryza, negative sore throat  Resp:   negative cough, hemoptysis, dyspnea  Cards:   palpitations,   :  negative for frequency, dysuria and hematuria  Skin:   negative for rash and pruritus  Heme:  negative for easy bruising and gum/nose bleeding  Neurolo:  negative for headaches, dizziness, vertigo, memory problems   Psych:  negative for feelings of anxiety, depression     Pertinent Positives include :    Objective:   VITALS:    Visit Vitals    /76 (BP 1 Location: Right arm, BP Patient Position: At rest)    Pulse (!) 101    Temp 98.5 °F (36.9 °C)    Resp 16    Ht 5' 6\" (1.676 m)    Wt 223 lb (101.2 kg)    SpO2 100%    Breastfeeding No    BMI 35.99 kg/m2     Temp (24hrs), Av.7 °F (37.1 °C), Min:98.3 °F (36.8 °C), Max:99 °F (37.2 °C)    PHYSICAL EXAM:   General:    Alert, cooperative, no distress, appears stated age.      Head:   Normocephalic, without obvious abnormality, atraumatic. Eyes:   Conjunctivae clear, anicteric sclerae. Pupils are equal  Nose:  Nares normal. No drainage or sinus tenderness. Throat:    Lips, mucosa, and tongue normal.  No Thrush  Neck:  Supple, symmetrical,  no adenopathy, thyroid: non tender    no carotid bruit and no JVD. Back:    No CVA tenderness. Lungs:   B/l air entry  Heart:   s1s2 tachycardia  Abdomen:   Soft,   Extremities: Extremities normal, atraumatic, no cyanosis. No edema. No clubbing  Skin:     No rashes or lesions. Not Jaundiced  Lymph: Cervical, supraclavicular normal.  Neurologic: Grossly non-focal    Pertinent Labs   As above    IMAGING RESULTS:  Reviewed MRI    Impression/Recommendation    27 yr female with recent MRSA bacteremia/epidural abscess/L5-S1 rt facet joint infection is post laminotomy and IV antibiotic- completed 6 weeks of IV antibiotic with normalization of the ESR presents with worsening back pain a week after stopping antibiotic    1) worsening back pain- NO fever /mild increase in ESR  MRI shows natural progression with improved findings and no evidence to suggest worsening infection. It could be that she now has rt L5/s1 facet arthrosis and the pain could be due to that and also due to stopping pain meds at the same time as IV antibiotic. There is a slight increase in ESR ( 4 to 28)  - which could be due to residual infection or her other condition lupus.   Blood cultures have been sent   Will restart dapt and add ceftaroline for a short period- may need 2 more weeks of IV dapto  Awaiting neurosurgery opinion   Not sure whether there is any residual collection that could be aspirated by IR    Sinus tachycardia for the past 6 weeks- initially thought to be due to PICC but it persist even after PICC removal- recommend cardiology consult  2 d echo on 9/20 showed moderate LA dilatation  JOSE did not show any vegetation      Lupus- continue plaquenil- check JEROD    Migraine-on topamax      Discussed with patient and Roger Rajput

## 2017-11-11 NOTE — ACP (ADVANCE CARE PLANNING)
Provided support to this pt in [facility] [place] [facility]. Pt welcomed [address] [place] [address] support. [address] introduced self and explained role in care team as well as purpose of this visit. Offered assistance to pt as a result of AMD consult. Pt wasn't interested in completing AMD at this time. Provided pt with AMD and \"Your Right to Decide Booklet\" as well as a Pastoral Care Card for contact information in the event assistance is needed. [address] [place] engaged pt in life review and offered listening presence. Pt explained her family dynamic and shared why she wanted to complete and AMD.  Pt shared she wanted to time to review it herself before completing it. Advised pt of chaplains' availability to offer assistance with completing AMD when ready. No further needs being expressed, CH assured pt of prayer and affirmed ongoing availability of support. Johnathon Garcia MDiv.  Staff   Please call [phone]) to page  if needed

## 2017-11-11 NOTE — PROGRESS NOTES
TRANSFER - IN REPORT:    Verbal report received from Annetta Colón rn(name) on Florentino Velez  being received from ed(unit) for routine progression of care      Report consisted of patients Situation, Background, Assessment and   Recommendations(SBAR). Information from the following report(s) SBAR, Kardex, [facility] and Recent Results was reviewed with the receiving nurse. Opportunity for questions and clarification was provided. Assessment completed upon patients arrival to unit and care assumed.

## 2017-11-11 NOTE — PROGRESS NOTES
Problem: Falls - Risk of  Goal: *Absence of Falls  Document Brandin Fall Risk and appropriate interventions in the flowsheet.    Outcome: Progressing Towards Goal  Fall Risk Interventions:            Medication Interventions: Patient to call before getting OOB, Teach patient to arise slowly

## 2017-11-12 LAB
ANION GAP SERPL CALC-SCNC: 10 MMOL/L (ref 5–15)
BACTERIA SPEC CULT: NORMAL
BACTERIA SPEC CULT: NORMAL
BUN SERPL-MCNC: 10 MG/DL (ref 6–20)
BUN/CREAT SERPL: 12 (ref 12–20)
CALCIUM SERPL-MCNC: 8.4 MG/DL (ref 8.5–10.1)
CHLORIDE SERPL-SCNC: 110 MMOL/L (ref 97–108)
CO2 SERPL-SCNC: 18 MMOL/L (ref 21–32)
CREAT SERPL-MCNC: 0.86 MG/DL (ref 0.55–1.02)
D DIMER PPP FEU-MCNC: 0.43 MG/L FEU (ref 0–0.65)
GLUCOSE SERPL-MCNC: 103 MG/DL (ref 65–100)
POTASSIUM SERPL-SCNC: 4 MMOL/L (ref 3.5–5.1)
SERVICE CMNT-IMP: NORMAL
SODIUM SERPL-SCNC: 138 MMOL/L (ref 136–145)

## 2017-11-12 PROCEDURE — 36415 COLL VENOUS BLD VENIPUNCTURE: CPT | Performed by: FAMILY MEDICINE

## 2017-11-12 PROCEDURE — 65270000029 HC RM PRIVATE

## 2017-11-12 PROCEDURE — 80048 BASIC METABOLIC PNL TOTAL CA: CPT | Performed by: FAMILY MEDICINE

## 2017-11-12 PROCEDURE — 74011250636 HC RX REV CODE- 250/636: Performed by: FAMILY MEDICINE

## 2017-11-12 PROCEDURE — 74011250637 HC RX REV CODE- 250/637: Performed by: FAMILY MEDICINE

## 2017-11-12 PROCEDURE — 74011000258 HC RX REV CODE- 258: Performed by: FAMILY MEDICINE

## 2017-11-12 PROCEDURE — 85379 FIBRIN DEGRADATION QUANT: CPT | Performed by: HOSPITALIST

## 2017-11-12 PROCEDURE — 74011250637 HC RX REV CODE- 250/637: Performed by: HOSPITALIST

## 2017-11-12 RX ADMIN — Medication 10 ML: at 22:50

## 2017-11-12 RX ADMIN — OXYCODONE HYDROCHLORIDE AND ACETAMINOPHEN 2 TABLET: 5; 325 TABLET ORAL at 22:50

## 2017-11-12 RX ADMIN — Medication 10 ML: at 07:21

## 2017-11-12 RX ADMIN — CEFTAROLINE FOSAMIL 600 MG: 600 POWDER, FOR SOLUTION INTRAVENOUS at 16:28

## 2017-11-12 RX ADMIN — GABAPENTIN 200 MG: 100 CAPSULE ORAL at 16:28

## 2017-11-12 RX ADMIN — OXYCODONE HYDROCHLORIDE AND ACETAMINOPHEN 2 TABLET: 5; 325 TABLET ORAL at 16:28

## 2017-11-12 RX ADMIN — GABAPENTIN 200 MG: 100 CAPSULE ORAL at 22:49

## 2017-11-12 RX ADMIN — HYDROXYCHLOROQUINE SULFATE 200 MG: 200 TABLET, FILM COATED ORAL at 16:28

## 2017-11-12 RX ADMIN — OXYCODONE HYDROCHLORIDE AND ACETAMINOPHEN 2 TABLET: 5; 325 TABLET ORAL at 10:28

## 2017-11-12 RX ADMIN — DAPTOMYCIN 750 MG: 500 INJECTION, POWDER, LYOPHILIZED, FOR SOLUTION INTRAVENOUS at 18:27

## 2017-11-12 RX ADMIN — POLYETHYLENE GLYCOL 3350 17 G: 17 POWDER, FOR SOLUTION ORAL at 09:44

## 2017-11-12 RX ADMIN — GABAPENTIN 200 MG: 100 CAPSULE ORAL at 09:45

## 2017-11-12 RX ADMIN — Medication 1 CAPSULE: at 09:45

## 2017-11-12 RX ADMIN — TOPIRAMATE 50 MG: 25 TABLET, FILM COATED ORAL at 18:27

## 2017-11-12 RX ADMIN — TOPIRAMATE 50 MG: 25 TABLET, FILM COATED ORAL at 09:45

## 2017-11-12 RX ADMIN — CEFTAROLINE FOSAMIL 600 MG: 600 POWDER, FOR SOLUTION INTRAVENOUS at 07:21

## 2017-11-12 RX ADMIN — ESCITALOPRAM OXALATE 10 MG: 10 TABLET ORAL at 09:45

## 2017-11-12 RX ADMIN — OXYCODONE HYDROCHLORIDE AND ACETAMINOPHEN 2 TABLET: 5; 325 TABLET ORAL at 04:20

## 2017-11-12 RX ADMIN — HYDROXYCHLOROQUINE SULFATE 200 MG: 200 TABLET, FILM COATED ORAL at 09:45

## 2017-11-12 NOTE — PROGRESS NOTES
Hospitalist Progress Note  Cruz Miller MD  Answering service: [phone] from in house phone  Cell: [phone]      Date of Service:  2017  NAME:  Nuvia Granger  :  1985  MRN:  [id number]      Admission Summary:   26 yo female with PMHx of SLE, recent hx of MRSA bacteremia and epidural abscess from L2-L5, s/p laminectomy and L3-L4 drainage of epidural abscess, was hospitalized from -, discharged on Vanc until , was doing well until 10/11 when she developed Rash which was treated with steroids, and vanc discontinued and started on Daptomycin. Pt completed Daptomycin. Pt started having Rt sided lower back pain for 2 days.  Pt called Kristina Handler who asked her to come to ER    Interval history / Subjective:     Pt seen and examined  Still c/o Rt Lower back pain but not worse  Controlled with pain meds     Assessment & Plan:     Lumbar Back pain with Recent hx of MRSA bacteremia and Epidural abscess at L2-L5  -s/p Laminectomy and drainage of abscess  - s/p completion of IV abx  - MRI lumbar: possible L5/S1 facet arthrosis with collection, Plan for IR guided aspiration in AM   - Restarted IV Abx per ID, appreciate Input  - Neurosurgery input pending  - BCx: NGTD  - may need 2 more weeks of IV Abx   - changed IV to PO pain meds  - Stool softners    Sinus tachy, chronic  - EKG: no significant changes or findings  - TSH: normal  - D-dimer to be checked   - may see cardiology as outpt  - recent JOSE without vegetations    Lupus  - c/w Plaquenil    Migraines  - c/w Topamax    Code status: FULL  DVT prophylaxis: SCDs    Care Plan discussed with: Patient/Family, Nurse and Consultant   Disposition: Home w/Family     Hospital Problems  Date Reviewed: 11/10/2017          Codes Class Noted POA    * (Principal)Spinal abscess ([facility] [address] ICD-10-CM: M46.20  ICD-9-CM: 730.08  11/10/2017 Unknown                Review of Systems:   A comprehensive review of systems was negative except for that written in the HPI. Vital Signs:    Last 24hrs VS reviewed since prior progress note. Most recent are:  Visit Vitals    /69 (BP 1 Location: Right arm)    Pulse 93    Temp 98.2 °F (36.8 °C)    Resp 16    Ht 5' 6\" (1.676 m)    Wt 101.2 kg (223 lb)    SpO2 100%    Breastfeeding No    BMI 35.99 kg/m2       No intake or output data in the 24 hours ending 11/12/17 1136     Physical Examination:             Constitutional:  No acute distress, cooperative, pleasant    ENT:  Oral mucous moist, ,    Resp:  CTA bilaterally. CV:  Regular rhythm, normal rate,     GI:  Soft, non distended, non tender. normoactive bowel sounds,     Musculoskeletal  Back:  No edema, warm, 2+ pulses throughout  Persistent Tenderness to palpation Rt paraspinal region at lumbar    Neurologic:  Moves all extremities. AAOx3,      Skin:  low back surgical scar, healing well, no drainage       Data Review:    Review and/or order of clinical lab test  Review and/or order of tests in the radiology section of CPT  Review and/or order of tests in the medicine section of CPT      Labs:     Recent Labs      11/11/17 0440  11/10/17   1841   WBC  7.1  9.8   HGB  12.8  14.0   HCT  38.2  41.0   PLT  209  253     Recent Labs      11/12/17   0305  11/11/17 0440  11/10/17   1841   NA  138  138  141   K  4.0  3.8  3.8   CL  110*  108  106   CO2  18*  19*  25   BUN  10  8  9   CREA  0.86  0.96  0.83   GLU  103*  93  89   CA  8.4*  8.1*  9.4     Recent Labs      11/10/17   1841   SGOT  14*   ALT  30   AP  116   TBILI  0.3   TP  8.3*   ALB  4.0   GLOB  4.3*     No results for input(s): INR, PTP, APTT in the last 72 hours. No lab exists for component: INREXT, INREXT   No results for input(s): FE, TIBC, PSAT, FERR in the last 72 hours. No results found for: FOL, RBCF   No results for input(s): PH, PCO2, PO2 in the last 72 hours.   Recent Labs      11/11/17 0440 11/10/17   1841   CPK  43  63     No results found for: CHOL, CHOLX, CHLST, CHOLV, HDL, LDL, LDLC, DLDLP, TGLX, TRIGL, TRIGP, CHHD, CHHDX  Lab Results   Component Value Date/Time    Glucose (POC) 102 09/22/2017 06:57 AM     Lab Results   Component Value Date/Time    Color YELLOW/STRAW 11/10/2017 07:57 PM    Appearance CLEAR 11/10/2017 07:57 PM    Specific gravity 1.008 11/10/2017 07:57 PM    Specific gravity 1.024 03/20/2015 02:41 PM    pH (UA) 7.5 11/10/2017 07:57 PM    Protein NEGATIVE  11/10/2017 07:57 PM    Glucose NEGATIVE  11/10/2017 07:57 PM    Ketone NEGATIVE  11/10/2017 07:57 PM    Bilirubin NEGATIVE  11/10/2017 07:57 PM    Urobilinogen 0.2 11/10/2017 07:57 PM    Nitrites NEGATIVE  11/10/2017 07:57 PM    Leukocyte Esterase NEGATIVE  11/10/2017 07:57 PM    Epithelial cells FEW 11/10/2017 07:57 PM    Bacteria NEGATIVE  11/10/2017 07:57 PM    WBC 0-4 11/10/2017 07:57 PM    RBC 0-5 11/10/2017 07:57 PM         Medications Reviewed:     Current Facility-Administered Medications   Medication Dose Route Frequency    oxyCODONE-acetaminophen (PERCOCET) 5-325 mg per tablet 1-2 Tab  1-2 Tab Oral Q6H PRN    polyethylene glycol (MIRALAX) packet 17 g  17 g Oral DAILY    escitalopram oxalate (LEXAPRO) tablet 10 mg  10 mg Oral DAILY    gabapentin (NEURONTIN) capsule 200 mg  200 mg Oral TID    hydroxychloroquine (PLAQUENIL) tablet 200 mg  200 mg Oral BID WITH MEALS    lactobac ac& pc-s.therm-b.anim (ELLEN Q/RISAQUAD)  1 Cap Oral DAILY    topiramate (TOPAMAX) tablet 50 mg  50 mg Oral BID    sodium chloride (NS) flush 5-10 mL  5-10 mL IntraVENous Q8H    sodium chloride (NS) flush 5-10 mL  5-10 mL IntraVENous PRN    0.9% sodium chloride infusion  50 mL/hr IntraVENous CONTINUOUS    acetaminophen (TYLENOL) tablet 650 mg  650 mg Oral Q4H PRN    ALPRAZolam (XANAX) tablet 0.25 mg  0.25 mg Oral BID PRN    DAPTOmycin (CUBICIN) 750 mg in 0.9% sodium chloride 50 mL IVPB RF formulation  750 mg IntraVENous Q24H    cefTARoline (TEFLARO) 600 mg in 0.9% sodium chloride (MBP/ADV) 50 mL  600 mg IntraVENous Q8H     ______________________________________________________________________  EXPECTED LENGTH OF STAY: - - -  ACTUAL LENGTH OF STAY:          2                 Blade Ferrari MD

## 2017-11-13 ENCOUNTER — APPOINTMENT (OUTPATIENT)
Dept: CT IMAGING | Age: 32
DRG: 096 | End: 2017-11-13
Attending: HOSPITALIST
Payer: COMMERCIAL

## 2017-11-13 ENCOUNTER — APPOINTMENT (OUTPATIENT)
Dept: INTERVENTIONAL RADIOLOGY/VASCULAR | Age: 32
DRG: 096 | End: 2017-11-13
Attending: HOSPITALIST
Payer: COMMERCIAL

## 2017-11-13 ENCOUNTER — HOME HEALTH ADMISSION (OUTPATIENT)
Dept: HOME HEALTH SERVICES | Facility: HOME HEALTH | Age: 32
End: 2017-11-13
Payer: COMMERCIAL

## 2017-11-13 VITALS
WEIGHT: 223 LBS | HEART RATE: 88 BPM | OXYGEN SATURATION: 100 % | DIASTOLIC BLOOD PRESSURE: 96 MMHG | RESPIRATION RATE: 18 BRPM | TEMPERATURE: 98.3 F | BODY MASS INDEX: 35.84 KG/M2 | SYSTOLIC BLOOD PRESSURE: 112 MMHG | HEIGHT: 66 IN

## 2017-11-13 LAB
ANA TITR SER IF: NEGATIVE {TITER}
ANION GAP SERPL CALC-SCNC: 7 MMOL/L (ref 5–15)
ATRIAL RATE: 105 BPM
BUN SERPL-MCNC: 9 MG/DL (ref 6–20)
BUN/CREAT SERPL: 11 (ref 12–20)
CALCIUM SERPL-MCNC: 8.8 MG/DL (ref 8.5–10.1)
CALCULATED P AXIS, ECG09: 30 DEGREES
CALCULATED R AXIS, ECG10: 13 DEGREES
CALCULATED T AXIS, ECG11: 10 DEGREES
CHLORIDE SERPL-SCNC: 109 MMOL/L (ref 97–108)
CO2 SERPL-SCNC: 23 MMOL/L (ref 21–32)
CREAT SERPL-MCNC: 0.83 MG/DL (ref 0.55–1.02)
DIAGNOSIS, 93000: NORMAL
GLUCOSE SERPL-MCNC: 94 MG/DL (ref 65–100)
P-R INTERVAL, ECG05: 154 MS
POTASSIUM SERPL-SCNC: 4 MMOL/L (ref 3.5–5.1)
Q-T INTERVAL, ECG07: 372 MS
QRS DURATION, ECG06: 96 MS
QTC CALCULATION (BEZET), ECG08: 491 MS
SODIUM SERPL-SCNC: 139 MMOL/L (ref 136–145)
VENTRICULAR RATE, ECG03: 105 BPM

## 2017-11-13 PROCEDURE — 87205 SMEAR GRAM STAIN: CPT | Performed by: HOSPITALIST

## 2017-11-13 PROCEDURE — 36569 INSJ PICC 5 YR+ W/O IMAGING: CPT | Performed by: INTERNAL MEDICINE

## 2017-11-13 PROCEDURE — 77030018781

## 2017-11-13 PROCEDURE — 74011250637 HC RX REV CODE- 250/637: Performed by: HOSPITALIST

## 2017-11-13 PROCEDURE — 0S933ZX DRAINAGE OF LUMBOSACRAL JOINT, PERCUTANEOUS APPROACH, DIAGNOSTIC: ICD-10-PCS | Performed by: RADIOLOGY

## 2017-11-13 PROCEDURE — 87075 CULTR BACTERIA EXCEPT BLOOD: CPT | Performed by: HOSPITALIST

## 2017-11-13 PROCEDURE — C1751 CATH, INF, PER/CENT/MIDLINE: HCPCS

## 2017-11-13 PROCEDURE — 77012 CT SCAN FOR NEEDLE BIOPSY: CPT

## 2017-11-13 PROCEDURE — 36415 COLL VENOUS BLD VENIPUNCTURE: CPT | Performed by: FAMILY MEDICINE

## 2017-11-13 PROCEDURE — 76937 US GUIDE VASCULAR ACCESS: CPT

## 2017-11-13 PROCEDURE — 74011250637 HC RX REV CODE- 250/637: Performed by: FAMILY MEDICINE

## 2017-11-13 PROCEDURE — 84145 PROCALCITONIN (PCT): CPT | Performed by: INTERNAL MEDICINE

## 2017-11-13 PROCEDURE — C1729 CATH, DRAINAGE: HCPCS

## 2017-11-13 PROCEDURE — 74011250636 HC RX REV CODE- 250/636: Performed by: RADIOLOGY

## 2017-11-13 PROCEDURE — 05H633Z INSERTION OF INFUSION DEVICE INTO LEFT SUBCLAVIAN VEIN, PERCUTANEOUS APPROACH: ICD-10-PCS | Performed by: HOSPITALIST

## 2017-11-13 PROCEDURE — 74011000258 HC RX REV CODE- 258: Performed by: FAMILY MEDICINE

## 2017-11-13 PROCEDURE — 80048 BASIC METABOLIC PNL TOTAL CA: CPT | Performed by: FAMILY MEDICINE

## 2017-11-13 PROCEDURE — 74011250636 HC RX REV CODE- 250/636: Performed by: FAMILY MEDICINE

## 2017-11-13 PROCEDURE — 87102 FUNGUS ISOLATION CULTURE: CPT | Performed by: RADIOLOGY

## 2017-11-13 RX ORDER — FENTANYL CITRATE 50 UG/ML
200 INJECTION, SOLUTION INTRAMUSCULAR; INTRAVENOUS
Status: DISCONTINUED | OUTPATIENT
Start: 2017-11-13 | End: 2017-11-13

## 2017-11-13 RX ORDER — OXYCODONE AND ACETAMINOPHEN 5; 325 MG/1; MG/1
1-2 TABLET ORAL
Qty: 30 TAB | Refills: 0 | Status: SHIPPED | OUTPATIENT
Start: 2017-11-13 | End: 2019-11-20 | Stop reason: ALTCHOICE

## 2017-11-13 RX ORDER — SODIUM CHLORIDE 0.9 % (FLUSH) 0.9 %
5-10 SYRINGE (ML) INJECTION
Status: DISCONTINUED | OUTPATIENT
Start: 2017-11-13 | End: 2017-11-13

## 2017-11-13 RX ORDER — SODIUM CHLORIDE 9 MG/ML
25 INJECTION, SOLUTION INTRAVENOUS
Status: DISCONTINUED | OUTPATIENT
Start: 2017-11-13 | End: 2017-11-13

## 2017-11-13 RX ORDER — MIDAZOLAM HYDROCHLORIDE 1 MG/ML
5 INJECTION, SOLUTION INTRAMUSCULAR; INTRAVENOUS
Status: DISCONTINUED | OUTPATIENT
Start: 2017-11-13 | End: 2017-11-13

## 2017-11-13 RX ADMIN — CEFTAROLINE FOSAMIL 600 MG: 600 POWDER, FOR SOLUTION INTRAVENOUS at 00:04

## 2017-11-13 RX ADMIN — Medication 1 CAPSULE: at 15:15

## 2017-11-13 RX ADMIN — Medication 1 CAPSULE: at 15:17

## 2017-11-13 RX ADMIN — OXYCODONE HYDROCHLORIDE AND ACETAMINOPHEN 2 TABLET: 5; 325 TABLET ORAL at 06:02

## 2017-11-13 RX ADMIN — CEFTAROLINE FOSAMIL 600 MG: 600 POWDER, FOR SOLUTION INTRAVENOUS at 07:07

## 2017-11-13 RX ADMIN — Medication 10 ML: at 06:02

## 2017-11-13 RX ADMIN — FENTANYL CITRATE 50 MCG: 50 INJECTION, SOLUTION INTRAMUSCULAR; INTRAVENOUS at 13:30

## 2017-11-13 RX ADMIN — HYDROXYCHLOROQUINE SULFATE 200 MG: 200 TABLET, FILM COATED ORAL at 15:14

## 2017-11-13 RX ADMIN — OXYCODONE HYDROCHLORIDE AND ACETAMINOPHEN 1 TABLET: 5; 325 TABLET ORAL at 15:15

## 2017-11-13 RX ADMIN — FENTANYL CITRATE 50 MCG: 50 INJECTION, SOLUTION INTRAMUSCULAR; INTRAVENOUS at 13:36

## 2017-11-13 RX ADMIN — MIDAZOLAM HYDROCHLORIDE 1 MG: 1 INJECTION, SOLUTION INTRAMUSCULAR; INTRAVENOUS at 13:34

## 2017-11-13 RX ADMIN — Medication 10 ML: at 15:34

## 2017-11-13 RX ADMIN — ESCITALOPRAM OXALATE 10 MG: 10 TABLET ORAL at 15:14

## 2017-11-13 RX ADMIN — GABAPENTIN 200 MG: 100 CAPSULE ORAL at 15:14

## 2017-11-13 RX ADMIN — ALPRAZOLAM 0.25 MG: 0.25 TABLET ORAL at 15:40

## 2017-11-13 RX ADMIN — TOPIRAMATE 50 MG: 25 TABLET, FILM COATED ORAL at 15:17

## 2017-11-13 RX ADMIN — FENTANYL CITRATE 50 MCG: 50 INJECTION, SOLUTION INTRAMUSCULAR; INTRAVENOUS at 13:00

## 2017-11-13 RX ADMIN — POLYETHYLENE GLYCOL 3350 17 G: 17 POWDER, FOR SOLUTION ORAL at 15:18

## 2017-11-13 RX ADMIN — SODIUM CHLORIDE 25 ML/HR: 900 INJECTION, SOLUTION INTRAVENOUS at 12:58

## 2017-11-13 RX ADMIN — MIDAZOLAM HYDROCHLORIDE 1 MG: 1 INJECTION, SOLUTION INTRAMUSCULAR; INTRAVENOUS at 13:18

## 2017-11-13 RX ADMIN — DAPTOMYCIN 750 MG: 500 INJECTION, POWDER, LYOPHILIZED, FOR SOLUTION INTRAVENOUS at 16:52

## 2017-11-13 RX ADMIN — MIDAZOLAM HYDROCHLORIDE 1 MG: 1 INJECTION, SOLUTION INTRAMUSCULAR; INTRAVENOUS at 13:30

## 2017-11-13 NOTE — PROGRESS NOTES
Karl Spore a 27 y. o.female with recent MRSA spine infection treated with 6 weeks of IV antibiotic admitted with worsening pain rt side of the lumbar area a week after finishing antibiotic.     She was recently in [facility] for MRSA bacteremia and epidural abscess. MRI which revealed Extensive epidural abscess from the L2 level to the mid L5 in the posterior right spinal canal, likely originating from inflammatory changes in the right L5-S1 facet joint. and underwent  laminectomy l3-L4  and drainage of epidural abscess. She stayed in the hospital between 9/18-9/25/17 and was sent home on vancomycin 1500mg Q 8 until 11/2/17. She was doing well until 10/11 when she developed a papular rash following vancomycin dose.  she went to [facility] and got a short course of prednisone  VAnco was discontinued and she went on Daptomycin 750mg IV and tolerated it well. She saw neurosurgery as OP and was cleared to start PT  I saw her in my office on 11/2 and labs were sent and ESR was 4 ( it was 117 at the beginning) and antibiotic was discontinued and PICC removed- She also had sinus tachycardia while she was on antibiotics and she thought it was the PICC that was possibly the cause of it but it is still high inspite of removing it. A week after the antibiotic was Dc she had worsening pain on the rt side of her lower back- She had also stopped percocet along with antibiotic . She talked to me on 11/10/17 and I had sent a prescription for bactrim and asked her to go to the ED for evaluation  if the pain was worse   As the pain got worse she came to the ED. A repeat MRI showed  No unusual postoperative findings related to posterior laminectomy and  treatment of posterior epidural abscess. 2.  residual findings of right L5-S1 facet arthrosis with small joint effusion. Residual right facet joint infection cannot be excluded.   3. No other significant focal fluid collection abscess or other unusual postoperative findings demonstrated. No fever or chills  She has lupus and is on plaquenil which she started 3 weeks ago after stopping for a few weeks after the first infection               Data  this hospitalization  Date  TMAX WBC other labs Intervention  Cultures ABX   11/10 N 9.8 ESR 28         11/11 N 7.1            11/12  N                                                                                                 PHYSICAL EXAM:   98.2 °F (36.8 °C) 93 115/69 84 --     General:                   well    Head:                                   Normocephalic, without obvious abnormality, atraumatic. Eyes:                                   Conjunctivae clear, anicteric sclerae. Pupils are equal  Nose:                                   Nares normal. No drainage or sinus tenderness. Throat:                                 Lips, mucosa, and tongue normal.  No Thrush  Neck:                                   Supple, symmetrical,  no adenopathy, thyroid: non tender                                              no carotid bruit and no JVD. Back:                                   No CVA tenderness. Lungs:                       B/l air entry  Heart:                                  s1s2 tachycardia  Abdomen:                  Soft,   Extremities:               Extremities normal, atraumatic, no cyanosis. No edema. No clubbing  Skin:                                    No rashes or lesions.   Not Jaundiced  Lymph:                      Cervical, supraclavicular normal.  Neurologic:                Grossly non-focal      IMAGING RESULTS:  Reviewed MRI     Impression/Recommendation     27 yr female with recent MRSA bacteremia/epidural abscess/L5-S1 rt facet joint infection is post laminotomy and IV antibiotic- completed 6 weeks of IV antibiotic with normalization of the ESR presents with worsening back pain a week after stopping antibiotic     1) worsening back pain- NO fever /mild increase in ESR  MRI shows natural progression with improved findings and no evidence to suggest worsening infection. It could be that she now has rt L5/s1 facet arthrosis and the pain could be due to that and also due to stopping pain meds at the same time as IV antibiotic. There is a slight increase in ESR ( 4 to 28)  - which could be due to residual infection or her other condition lupus.   Blood cultures have been sent   started dapt and added ceftaroline for a short period- may need 2 more weeks of IV dapto  neurosurgery opinion   There is asmall residual 1.3 cm collection- IR will drain it on Monday and send for cultures     Sinus tachycardia for the past 6 weeks- initially thought to be due to PICC but it persist even after PICC removal- recommend cardiology consult  2 d echo on 9/20/17 showed moderate LA dilatation  JOSE did not show any vegetation        Lupus- continue plaquenil- check JEROD     Migraine-on topamax        Discussed with patient and Laith aMrmolejo

## 2017-11-13 NOTE — ROUTINE PROCESS
TRANSFER - OUT REPORT:    Verbal report given to [facility] (name) on [facility]  being transferred to  (unit) for routine progression of care       Report consisted of patients Situation, Background, Assessment and   Recommendations(SBAR). Information from the following report(s) Procedure Summary and MAR was reviewed with the receiving nurse. Lines:   Peripheral IV 11/13/17 Right Forearm (Active)        Opportunity for questions and clarification was provided, post lumbar cyst aspiration in CT main.     Patient transported with:   O2 @ RA liters

## 2017-11-13 NOTE — H&P
Radiology History and Physical    Patient: Leslie Mcclelland 27 y.o. female     Referring Physician: Earline Treadwell. Chief Complaint:   Chief Complaint   Patient presents with    Post OP Complication    Back Pain       History of Present Illness: Synovial cyst.    History:    Past Medical History:   Diagnosis Date    Abscess in epidural space of lumbar spine     Lupus     PVC's (premature ventricular contractions)      Family History   Problem Relation Age of Onset    Diabetes Father     Diabetes Brother      Social History     Social History    Marital status: LEGALLY      Spouse name: N/A    Number of children: N/A    Years of education: N/A     Occupational History    Not on file. Social History Main Topics    Smoking status: Former Smoker    Smokeless tobacco: Never Used    Alcohol use Yes      Comment: occasional    Drug use: No    Sexual activity: Not on file      Comment: seperated has 2 children     Other Topics Concern    Not on file     Social History Narrative       Allergies:    Allergies   Allergen Reactions    Iodine Anaphylaxis    Vancomycin Rash       Current Medications:  Current Facility-Administered Medications   Medication Dose Route Frequency    oxyCODONE-acetaminophen (PERCOCET) 5-325 mg per tablet 1-2 Tab  1-2 Tab Oral Q6H PRN    polyethylene glycol (MIRALAX) packet 17 g  17 g Oral DAILY    escitalopram oxalate (LEXAPRO) tablet 10 mg  10 mg Oral DAILY    gabapentin (NEURONTIN) capsule 200 mg  200 mg Oral TID    hydroxychloroquine (PLAQUENIL) tablet 200 mg  200 mg Oral BID WITH MEALS    lactobac ac& pc-s.therm-b.anim (ELLEN Q/RISAQUAD)  1 Cap Oral DAILY    topiramate (TOPAMAX) tablet 50 mg  50 mg Oral BID    sodium chloride (NS) flush 5-10 mL  5-10 mL IntraVENous Q8H    sodium chloride (NS) flush 5-10 mL  5-10 mL IntraVENous PRN    0.9% sodium chloride infusion  50 mL/hr IntraVENous CONTINUOUS    acetaminophen (TYLENOL) tablet 650 mg  650 mg Oral Q4H PRN    ALPRAZolam (XANAX) tablet 0.25 mg  0.25 mg Oral BID PRN    DAPTOmycin (CUBICIN) 750 mg in 0.9% sodium chloride 50 mL IVPB RF formulation  750 mg IntraVENous Q24H    cefTARoline (TEFLARO) 600 mg in 0.9% sodium chloride (MBP/ADV) 50 mL  600 mg IntraVENous Q8H        Physical Exam:  Blood pressure 110/81, pulse 100, temperature 98.4 °F (36.9 °C), resp. rate 16, height 5' 6\" (1.676 m), weight 101.2 kg (223 lb), SpO2 99 %, not currently breastfeeding. GENERAL: alert, cooperative, no distress, appears stated age, LUNG: clear to auscultation bilaterally, HEART: regular rate and rhythm      Alerts:    Hospital Problems  Date Reviewed: 11/13/2017          Codes Class Noted POA    * (Principal)Spinal abscess ([facility] [address] ICD-10-CM: M46.20  ICD-9-CM: 730.08  11/10/2017 Unknown              Laboratory:      Recent Labs      11/13/17   0333   11/11/17   0440   HGB   --    --   12.8   HCT   --    --   38.2   WBC   --    --   7.1   PLT   --    --   209   BUN  9   < >  8   CREA  0.83   < >  0.96   K  4.0   < >  3.8    < > = values in this interval not displayed. Plan of Care/Planned Procedure:  Risks, benefits, and alternatives reviewed with patient and she agrees to proceed with the procedure. Full dictated report to follow.

## 2017-11-13 NOTE — PROGRESS NOTES
Call received from [address] with [facility] stating they have accepted patient for home infusion. Cm informed him that patient will be getting a midline as opposed to a PICC and will discharge today. He stated that patient will not need any education as she just finished a course of the same medication, and she can leave whenever she is discharged, and they will deliver the medication to her home. He stated it would make more sense for her to receive her first dose here in the hospital as opposed to receiving her first dose at home. Sebastien will discuss with the nurse.    [employer] , [place]

## 2017-11-13 NOTE — PROGRESS NOTES
OPAT  1) care of MIDline  2) Daptomycin 800mg IVPB once a day until  11/27/17  3) CPK/ESR/CBC/CMP every Thursday  4) Fax results promptly to Derek Hartman [phone]  5) call Derek Hartman with any critical value or [phone]

## 2017-11-13 NOTE — DISCHARGE INSTRUCTIONS
Please bring this form with you to show your primary care provider at your follow-up appointment. Primary care provider:  Dr. Steven Cook MD    Discharging provider:  Caleb Barron MD    You have been admitted to the hospital with the following diagnoses:  · Spinal abscess [facility])    FOLLOW-UP CARE RECOMMENDATIONS:    APPOINTMENTS:  Follow-up Information     Follow up With Details Comments [address]   [address] [facility]. [facility] [address]  [place] [address]  1st Floor  [place] [address]  [phone]    Steven Cook MD In 1 week Discharge follow up  [address]      Sonia Mendoza MD  Follow up for discharge follow up in 2 weeks [address] [facility] [address]  [phone]              FOLLOW-UP TESTS recommended: NONE    PENDING TEST RESULTS:  At the time of your discharge the following test results are still pending: NONE  Please make sure you review these results with your outpatient follow-up provider(s). SYMPTOMS to watch for: chest pain, shortness of breath, fever, chills, nausea, vomiting, diarrhea, change in mentation, falling, weakness, bleeding. DIET/what to eat:  Regular Diet    ACTIVITY:  Activity as tolerated    WOUND CARE: none    EQUIPMENT needed:  None      What to do if new or unexpected symptoms occur? If you experience any of the above symptoms (or should other concerns or questions arise after discharge) please call your primary care physician. Return to the emergency room if you cannot get hold of your doctor. · It is very important that you keep your follow-up appointment(s). · Please bring discharge papers, medication list (and/or medication bottles) to your follow-up appointments for review by your outpatient provider(s).   · Please check the list of medications and be sure it includes every medication (even non-prescription medications) that your provider wants you to take. · It is important that you take the medication exactly as they are prescribed. · Keep your medication in the bottles provided by the pharmacist and keep a list of the medication names, dosages, and times to be taken in your wallet. · Do not take other medications without consulting your doctor. · If you have any questions about your medications or other instructions, please talk to your nurse or care provider before you leave the hospital.    I understand that if any problems occur once I am at home I am to contact my physician. These instructions were explained to me and I had the opportunity to ask questions.

## 2017-11-13 NOTE — PROGRESS NOTES
Care Management Interventions  PCP Verified by CM: Yes  Transition of Care Consult (CM Consult): [address]: Yes  MyChart Signup: No  Discharge Durable Medical Equipment: No  Physical Therapy Consult: Yes  Occupational Therapy Consult: Yes  Speech Therapy Consult: No  Current Support Network: Own Home  Confirm Follow Up Transport: Family  Plan discussed with Pt/Family/Caregiver: Yes  Freedom of Choice Offered: Yes  Discharge Location  Discharge Placement: Home with home health    Chart reviewed for transitions of care, met with patient at the bedside to explain role and offer support. Patient admitted with worsening pain in the lumbar area after finishing a 6 week treatment of IV ABX for a MRSA spine infection. Patient confirmed that she was using [address] for IV ABX and would like to use them again. Referrals will be sent to both; patient is pending PICC line placement at this time. Will continue to follow.   [employer] , [place]

## 2017-11-13 NOTE — PROGRESS NOTES
TRANSFER - IN REPORT:    [facility] on Lynette Nava  being received from Angio (unit) for routine progression of care      Report consisted of patients Situation, Background, Assessment and   Recommendations(SBAR). Information from the following report(s) SBAR, Kardex, Procedure Summary, Intake/Output, MAR, Accordion and Recent Results was reviewed with the receiving nurse. Opportunity for questions and clarification was provided. Assessment completed upon patients arrival to unit and care assumed.

## 2017-11-13 NOTE — PROGRESS NOTES
Hospitalist    Pt seen and examined  To IR for aspiration of fluid collection today  PICC placement  ID to place IV Abx orders  Plan to d/c in afternoon       Marcia Sher MD  Internal Medicine  Mobile/text: [phone]

## 2017-11-13 NOTE — PROCEDURES
Midline Insertion and Progress Note    PRE-PROCEDURE VERIFICATION  Correct Procedure: yes  Correct Site:  yes  Temperature: Temp: 98.3 °F (36.8 °C), Temperature Source: Temp Source: Oral  Recent Labs      11/13/17   0333   11/11/17   0440   BUN  9   < >  8   CREA  0.83   < >  0.96   PLT   --    --   209   WBC   --    --   7.1    < > = values in this interval not displayed. Allergies: Iodine and Vancomycin    PROCEDURE DETAIL  A Powerglide single lumen midline was started for antibiotic therapy and Home IV Therapy.  The following documentation is in addition to the Midline properties in the lines/airways flowsheet :  Lot #: [id number]  Catheter Total Length: 8 (cm)  Vein Selection for Midline :left basilic  Complication Related to Insertion: none      Line is okay to use: yes    Naomy Kent, RN, BSN, [address] [facility]  Vascular Tatyana Rodrigues

## 2017-11-13 NOTE — DISCHARGE SUMMARY
Discharge Summary     Patient:  Xochilt Vann       MRN: [id number]       YOB: 1985       Age: 27 y.o. Date of admission:  11/10/2017    Date of discharge:  11/13/2017    Primary care provider: Dr. Darrell Garcia MD    Admitting provider:  Ramon Pan MD    Discharging provider:  Margaux Caraballo MD - [phone]  If unavailable, call [phone] and ask the  to page the triage hospitalist.    Consultations  · IP CONSULT TO NEUROSURGERY  · IP CONSULT TO HOSPITALIST  · IP CONSULT TO INFECTIOUS DISEASES    Procedures  · * No surgery found *    Discharge destination: Home with [facility]. The patient is stable for discharge. Admission diagnosis  · Spinal abscess [facility])    Current Discharge Medication List      START taking these medications    Details   oxyCODONE-acetaminophen (PERCOCET) 5-325 mg per tablet Take 1-2 Tabs by mouth every six (6) hours as needed. Max Daily Amount: 8 Tabs. Qty: 30 Tab, Refills: 0         CONTINUE these medications which have NOT CHANGED    Details   hydroxychloroquine (PLAQUENIL) 200 mg tablet Take 200 mg by mouth two (2) times a day. cyclobenzaprine (FLEXERIL) 10 mg tablet Take 1 Tab by mouth three (3) times daily as needed for Muscle Spasm(s). Qty: 30 Tab, Refills: 0      gabapentin (NEURONTIN) 100 mg capsule Take 2 Caps by mouth three (3) times daily. Qty: 180 Cap, Refills: 1      L. acidoph & paracasei- S therm- Bifido (ELLEN-Q/RISAQUAD) 8 billion cell cap cap Take 1 Cap by mouth daily. Qty: 30 Cap, Refills: 1      escitalopram oxalate (LEXAPRO) 10 mg tablet Take 10 mg by mouth daily. buPROPion SR (WELLBUTRIN SR) 150 mg SR tablet Take 150 mg by mouth two (2) times a day. topiramate (TOPAMAX) 50 mg tablet Take 50 mg by mouth two (2) times a day.          STOP taking these medications       trimethoprim-sulfamethoxazole (BACTRIM DS, SEPTRA DS) 160-800 mg per tablet Comments:   Reason for Stopping: Follow-up Information     Follow up With Details Comments [address] [facility] [facility] [address]   [address] [facility] [address]  [facility]   [address]  1st Floor  [address] [facility]  [phone]    Nazanin Combs MD In 1 week Discharge follow up  [address]      Rachael Pastrana MD  Follow up for discharge follow up in 2 weeks [address]  [phone]            Final discharge diagnoses and brief hospital course  Please also refer to the admission H&P for details on the presenting problem. 28 yo female with PMHx of SLE, recent hx of MRSA bacteremia and epidural abscess from L2-L5, s/p laminectomy and L3-L4 drainage of epidural abscess, was hospitalized from 9/18-24, discharged on Vanc until 11/2, was doing well until 10/11 when she developed Rash which was treated with steroids, and vanc discontinued and started on Daptomycin. Pt completed Daptomycin. Pt started having Rt sided lower back pain for 2 days.  Pt called Isreal Taveras who asked her to come to ER    Lumbar Back pain with Recent hx of MRSA bacteremia and Epidural abscess at L2-L5  -s/p Laminectomy and drainage of abscess  - s/p completion of IV abx  - MRI lumbar: possible L5/S1 facet arthrosis with collection,   - s/p IR guided aspiration, follow cultures  - Restarted IV Abx per ID, appreciate Input  - Neurosurgery input pending  - BCx: NGTD  - may need 2 more weeks of IV Abx   - changed IV to PO pain meds  - Stool softners  - C/w Daptomycin daily    Sinus tachy, chronic  - EKG: no significant changes or findings  - TSH: normal  - D-dimer normal  - may see cardiology as outpt  - recent JOSE without vegetations     Lupus  - c/w Plaquenil     Migraines  - c/w Topamax    Outpatient IV Abx  1) care of MIDline  2) Daptomycin 800mg IVPB once a day until  11/27/17  3) CPK/ESR/CBC/CMP every Thursday  4) Fax results promptly to Gilles Rivas [phone]    FOLLOW-UP TESTS recommended: NONE     PENDING TEST RESULTS:  At the time of your discharge the following test results are still pending: NONE  Please make sure you review these results with your outpatient follow-up provider(s).     SYMPTOMS to watch for: chest pain, shortness of breath, fever, chills, nausea, vomiting, diarrhea, change in mentation, falling, weakness, bleeding.     DIET/what to eat:  Regular Diet     ACTIVITY:  Activity as tolerated     WOUND CARE: none     EQUIPMENT needed:  None    Physical examination at discharge  Visit Vitals    /81 (BP 1 Location: Right arm, BP Patient Position: At rest)    Pulse 86    Temp 98.3 °F (36.8 °C)    Resp 16    Ht 5' 6\" (1.676 m)    Wt 101.2 kg (223 lb)    SpO2 99%    Breastfeeding No    BMI 35.99 kg/m2     Constitutional:  No acute distress, cooperative, pleasant    ENT:  Oral mucous moist, ,    Resp:  CTA bilaterally. CV:  Regular rhythm, normal rate,     GI:  Soft, non distended, non tender. normoactive bowel sounds,     Musculoskeletal  Back:  No edema, warm, 2+ pulses throughout  Persistent Tenderness to palpation Rt paraspinal region at lumbar    Neurologic:  Moves all extremities. AAOx3,                               Skin:  low back surgical scar, healing well, no drainage      Pertinent imaging studies:    MRI Lumbar  IMPRESSION:  1. No unusual postoperative findings related to posterior laminectomy and  treatment of posterior epidural abscess. 2. Results residual findings of right L5-S1 facet arthrosis with small joint effusion. Residual right facet joint infection cannot be excluded. 3. No other significant focal fluid collection abscess or other unusual postoperative findings demonstrated.     Recent Labs      11/11/17   0440  11/10/17   1841   WBC  7.1  9.8   HGB  12.8  14.0   HCT 38.2  41.0   PLT  209  253     Recent Labs      11/13/17   0333  11/12/17   0305  11/11/17   0440   NA  139  138  138   K  4.0  4.0  3.8   CL  109*  110*  108   CO2  23  18*  19*   BUN  9  10  8   CREA  0.83  0.86  0.96   GLU  94  103*  93   CA  8.8  8.4*  8.1*     Recent Labs      11/10/17   1841   SGOT  14*   AP  116   TP  8.3*   ALB  4.0   GLOB  4.3*     No results for input(s): INR, PTP, APTT in the last 72 hours. No lab exists for component: INREXT   No results for input(s): FE, TIBC, PSAT, FERR in the last 72 hours. No results for input(s): PH, PCO2, PO2 in the last 72 hours. Recent Labs      11/11/17   0440  11/10/17   1841   CPK  43  63     No components found for: GLPOC    Chronic Diagnoses:    Problem List as of 11/13/2017  Date Reviewed: 11/13/2017          Codes Class Noted - Resolved    * (Principal)Spinal abscess ([facility] ICD-10-CM: M46.20  ICD-9-CM: 730.08  11/10/2017 - Present        Hypokalemia ICD-10-CM: E87.6  ICD-9-CM: 276.8  9/18/2017 - Present        Intractable back pain ICD-10-CM: M54.9  ICD-9-CM: 724.5  9/18/2017 - Present        SIRS due to infectious process without acute organ dysfunction ([facility] ICD-10-CM: A41.9  ICD-9-CM: 038.9, 995.91  9/18/2017 - Present        Obesity (BMI 30-39.9) (Chronic) ICD-10-CM: E66.9  ICD-9-CM: 278.00  9/18/2017 - Present        Bacteriuria ICD-10-CM: R82.71  ICD-9-CM: 791.9  9/18/2017 - Present        PVC's (premature ventricular contractions) ICD-10-CM: I49.3  ICD-9-CM: 427.69  5/15/2015 - Present        Palpitations ICD-10-CM: R00.2  ICD-9-CM: 785.1  5/15/2015 - Present        Lupus ICD-10-CM: L93.0  ICD-9-CM: 695.4  5/15/2015 - Present              Time spent on discharge related activities today greater than 30 minutes.       Signed:  Arely Martinez MD                 Hospitalist, Internal Medicine      Cc: Aidan Umana MD

## 2017-11-14 ENCOUNTER — HOME CARE VISIT (OUTPATIENT)
Dept: SCHEDULING | Facility: HOME HEALTH | Age: 32
End: 2017-11-14
Payer: COMMERCIAL

## 2017-11-14 VITALS
RESPIRATION RATE: 16 BRPM | WEIGHT: 214 LBS | HEART RATE: 98 BPM | HEIGHT: 66 IN | OXYGEN SATURATION: 98 % | SYSTOLIC BLOOD PRESSURE: 128 MMHG | DIASTOLIC BLOOD PRESSURE: 84 MMHG | BODY MASS INDEX: 34.39 KG/M2 | TEMPERATURE: 98.4 F

## 2017-11-14 LAB — PROCALCITONIN SERPL-MCNC: <0.02 NG/ML (ref 0–0.08)

## 2017-11-14 PROCEDURE — G0299 HHS/HOSPICE OF RN EA 15 MIN: HCPCS

## 2017-11-14 RX ADMIN — Medication 10 ML: at 10:00

## 2017-11-15 LAB
BACTERIA SPEC CULT: NORMAL
SERVICE CMNT-IMP: NORMAL

## 2017-11-16 ENCOUNTER — HOME CARE VISIT (OUTPATIENT)
Dept: SCHEDULING | Facility: HOME HEALTH | Age: 32
End: 2017-11-16
Payer: COMMERCIAL

## 2017-11-16 VITALS
RESPIRATION RATE: 18 BRPM | SYSTOLIC BLOOD PRESSURE: 120 MMHG | HEART RATE: 106 BPM | OXYGEN SATURATION: 98 % | TEMPERATURE: 98.1 F | DIASTOLIC BLOOD PRESSURE: 80 MMHG

## 2017-11-16 PROCEDURE — G0299 HHS/HOSPICE OF RN EA 15 MIN: HCPCS

## 2017-11-20 LAB
BACTERIA SPEC CULT: NORMAL
GRAM STN SPEC: NORMAL
GRAM STN SPEC: NORMAL
SERVICE CMNT-IMP: NORMAL
SERVICE CMNT-IMP: NORMAL

## 2017-11-22 ENCOUNTER — HOME CARE VISIT (OUTPATIENT)
Dept: SCHEDULING | Facility: HOME HEALTH | Age: 32
End: 2017-11-22
Payer: COMMERCIAL

## 2017-11-22 PROCEDURE — G0300 HHS/HOSPICE OF LPN EA 15 MIN: HCPCS

## 2017-11-30 ENCOUNTER — HOME CARE VISIT (OUTPATIENT)
Dept: SCHEDULING | Facility: HOME HEALTH | Age: 32
End: 2017-11-30
Payer: COMMERCIAL

## 2017-11-30 PROCEDURE — G0299 HHS/HOSPICE OF RN EA 15 MIN: HCPCS

## 2017-12-01 VITALS
SYSTOLIC BLOOD PRESSURE: 128 MMHG | RESPIRATION RATE: 16 BRPM | DIASTOLIC BLOOD PRESSURE: 82 MMHG | TEMPERATURE: 98.2 F | HEART RATE: 112 BPM

## 2017-12-01 VITALS
RESPIRATION RATE: 18 BRPM | SYSTOLIC BLOOD PRESSURE: 110 MMHG | TEMPERATURE: 98.2 F | HEART RATE: 92 BPM | OXYGEN SATURATION: 99 % | DIASTOLIC BLOOD PRESSURE: 79 MMHG

## 2017-12-11 LAB
BACTERIA SPEC CULT: NORMAL
SERVICE CMNT-IMP: NORMAL

## 2018-01-05 ENCOUNTER — HOSPITAL ENCOUNTER (OUTPATIENT)
Dept: MRI IMAGING | Age: 33
Discharge: HOME | End: 2018-01-05
Attending: NEUROLOGICAL SURGERY
Payer: COMMERCIAL

## 2018-01-05 DIAGNOSIS — M54.16 LUMBAR RADICULOPATHY: ICD-10-CM

## 2018-01-05 PROCEDURE — A9576 INJ PROHANCE MULTIPACK: HCPCS | Performed by: RADIOLOGY

## 2018-01-05 PROCEDURE — 72158 MRI LUMBAR SPINE W/O & W/DYE: CPT

## 2018-02-12 ENCOUNTER — HOSPITAL ENCOUNTER (EMERGENCY)
Age: 33
Discharge: HOME | End: 2018-02-12
Attending: EMERGENCY MEDICINE | Admitting: EMERGENCY MEDICINE
Payer: COMMERCIAL

## 2018-02-12 ENCOUNTER — APPOINTMENT (OUTPATIENT)
Dept: CT IMAGING | Age: 33
End: 2018-02-12
Attending: PHYSICIAN ASSISTANT
Payer: COMMERCIAL

## 2018-02-12 VITALS
WEIGHT: 232 LBS | BODY MASS INDEX: 37.28 KG/M2 | RESPIRATION RATE: 16 BRPM | SYSTOLIC BLOOD PRESSURE: 108 MMHG | OXYGEN SATURATION: 98 % | HEIGHT: 66 IN | DIASTOLIC BLOOD PRESSURE: 78 MMHG | HEART RATE: 86 BPM | TEMPERATURE: 98.5 F

## 2018-02-12 DIAGNOSIS — M79.601 RIGHT ARM PAIN: Primary | ICD-10-CM

## 2018-02-12 LAB
ALBUMIN SERPL-MCNC: 4.2 G/DL (ref 3.5–5)
ALBUMIN/GLOB SERPL: 1.1 {RATIO} (ref 1.1–2.2)
ALP SERPL-CCNC: 99 U/L (ref 45–117)
ALT SERPL-CCNC: 44 U/L (ref 12–78)
ANION GAP SERPL CALC-SCNC: 9 MMOL/L (ref 5–15)
AST SERPL-CCNC: 23 U/L (ref 15–37)
BASOPHILS # BLD: 0.1 K/UL (ref 0–0.1)
BASOPHILS NFR BLD: 1 % (ref 0–1)
BILIRUB SERPL-MCNC: 0.4 MG/DL (ref 0.2–1)
BUN SERPL-MCNC: 10 MG/DL (ref 6–20)
BUN/CREAT SERPL: 11 (ref 12–20)
CALCIUM SERPL-MCNC: 9 MG/DL (ref 8.5–10.1)
CHLORIDE SERPL-SCNC: 109 MMOL/L (ref 97–108)
CO2 SERPL-SCNC: 23 MMOL/L (ref 21–32)
CREAT SERPL-MCNC: 0.89 MG/DL (ref 0.55–1.02)
DIFFERENTIAL METHOD BLD: NORMAL
EOSINOPHIL # BLD: 0.2 K/UL (ref 0–0.4)
EOSINOPHIL NFR BLD: 3 % (ref 0–7)
ERYTHROCYTE [DISTWIDTH] IN BLOOD BY AUTOMATED COUNT: 13.2 % (ref 11.5–14.5)
GLOBULIN SER CALC-MCNC: 3.9 G/DL (ref 2–4)
GLUCOSE SERPL-MCNC: 85 MG/DL (ref 65–100)
HCT VFR BLD AUTO: 43.4 % (ref 35–47)
HGB BLD-MCNC: 15 G/DL (ref 11.5–16)
IMM GRANULOCYTES # BLD: 0 K/UL (ref 0–0.04)
IMM GRANULOCYTES NFR BLD AUTO: 0 % (ref 0–0.5)
LYMPHOCYTES # BLD: 1.7 K/UL (ref 0.8–3.5)
LYMPHOCYTES NFR BLD: 25 % (ref 12–49)
MCH RBC QN AUTO: 32 PG (ref 26–34)
MCHC RBC AUTO-ENTMCNC: 34.6 G/DL (ref 30–36.5)
MCV RBC AUTO: 92.5 FL (ref 80–99)
MONOCYTES # BLD: 0.4 K/UL (ref 0–1)
MONOCYTES NFR BLD: 6 % (ref 5–13)
NEUTS SEG # BLD: 4.4 K/UL (ref 1.8–8)
NEUTS SEG NFR BLD: 65 % (ref 32–75)
NRBC # BLD: 0 K/UL (ref 0–0.01)
NRBC BLD-RTO: 0 PER 100 WBC
PLATELET # BLD AUTO: 295 K/UL (ref 150–400)
PMV BLD AUTO: 9.1 FL (ref 8.9–12.9)
POTASSIUM SERPL-SCNC: 3.6 MMOL/L (ref 3.5–5.1)
PROT SERPL-MCNC: 8.1 G/DL (ref 6.4–8.2)
RBC # BLD AUTO: 4.69 M/UL (ref 3.8–5.2)
SODIUM SERPL-SCNC: 141 MMOL/L (ref 136–145)
WBC # BLD AUTO: 6.8 K/UL (ref 3.6–11)

## 2018-02-12 PROCEDURE — 74011250636 HC RX REV CODE- 250/636: Performed by: PHYSICIAN ASSISTANT

## 2018-02-12 PROCEDURE — 80053 COMPREHEN METABOLIC PANEL: CPT | Performed by: PHYSICIAN ASSISTANT

## 2018-02-12 PROCEDURE — 36415 COLL VENOUS BLD VENIPUNCTURE: CPT | Performed by: PHYSICIAN ASSISTANT

## 2018-02-12 PROCEDURE — 85025 COMPLETE CBC W/AUTO DIFF WBC: CPT | Performed by: PHYSICIAN ASSISTANT

## 2018-02-12 PROCEDURE — 93971 EXTREMITY STUDY: CPT

## 2018-02-12 PROCEDURE — 74011250637 HC RX REV CODE- 250/637: Performed by: PHYSICIAN ASSISTANT

## 2018-02-12 PROCEDURE — 99283 EMERGENCY DEPT VISIT LOW MDM: CPT

## 2018-02-12 RX ORDER — MORPHINE SULFATE 4 MG/ML
4 INJECTION, SOLUTION INTRAMUSCULAR; INTRAVENOUS
Status: DISCONTINUED | OUTPATIENT
Start: 2018-02-12 | End: 2018-02-12

## 2018-02-12 RX ORDER — OXYCODONE AND ACETAMINOPHEN 5; 325 MG/1; MG/1
1 TABLET ORAL
Status: COMPLETED | OUTPATIENT
Start: 2018-02-12 | End: 2018-02-12

## 2018-02-12 RX ORDER — ONDANSETRON 2 MG/ML
4 INJECTION INTRAMUSCULAR; INTRAVENOUS
Status: DISCONTINUED | OUTPATIENT
Start: 2018-02-12 | End: 2018-02-12

## 2018-02-12 RX ORDER — HYDROCODONE BITARTRATE AND ACETAMINOPHEN 5; 325 MG/1; MG/1
1 TABLET ORAL
Qty: 6 TAB | Refills: 0 | Status: SHIPPED | OUTPATIENT
Start: 2018-02-12 | End: 2019-11-20 | Stop reason: ALTCHOICE

## 2018-02-12 RX ADMIN — OXYCODONE HYDROCHLORIDE AND ACETAMINOPHEN 1 TABLET: 5; 325 TABLET ORAL at 19:56

## 2018-02-12 NOTE — ED PROVIDER NOTES
HPI Comments: 26 y.o. female with past medical history significant for lupus, PVCs, and abscess in the epidural space of the lumbar spine who presents to the ED with chief complaint of arm pain. Pt reports sudden onset R forearm pain yesterday morning. Pt reports over the last 24 hours the pain has slowly progressed up to her R shoulder and down to her R hand. Pt reports being seen at her PCP's office today and sent here for vascular imaging, pt's paperwork states the doctor is concerned for thoracic outlet syndrome. Pt reports taking Tylenol yesterday with no relief. Pt reports having back surgery last September, and had an IV blow in her R forearm, pt notes this is where the pain started yesterday. Pt denies any hx of blood clots. Pt denies any neck pain, CP, SOB, weakness, or numbness. There are no other acute medical concerns at this time. Social Hx: Former smoker; Occasional EtOH use  PCP: Beverly De La Paz MD    Note written by Abida Sands, as dictated by Piter Harrison PA-C 6:43 PM          The history is provided by the patient. No  was used. 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      Social History Main Topics    Smoking status: Former Smoker    Smokeless tobacco: Never Used    Alcohol use Yes      Comment: occasional    Drug use: No    Sexual activity: Not on file      Comment: richard has 2 children     Other Topics Concern    Not on file     Social History Narrative         ALLERGIES: Iodine and Vancomycin    Review of Systems   Constitutional: Negative for chills and fever. HENT: Negative for trouble swallowing. Eyes: Negative for discharge. Respiratory: Negative for shortness of breath. Cardiovascular: Negative for chest pain. Gastrointestinal: Negative for abdominal pain, diarrhea and vomiting. Genitourinary: Negative for flank pain. Musculoskeletal: Positive for arthralgias and myalgias. Negative for neck pain. Neurological: Negative for weakness and numbness. All other systems reviewed and are negative. Vitals:    02/12/18 1717 02/12/18 2025   BP: (!) 146/102 108/78   Pulse: (!) 113 86   Resp: 18 16   Temp: 98.5 °F (36.9 °C) 98.5 °F (36.9 °C)   SpO2: 97% 98%   Weight: 105.2 kg (232 lb)    Height: 5' 6\" (1.676 m)             Physical Exam   Constitutional: She is oriented to person, place, and time. She appears well-developed and well-nourished. No distress. Pleasant WF   HENT:   Head: Normocephalic and atraumatic. Right Ear: External ear normal.   Left Ear: External ear normal.   Eyes: Conjunctivae are normal. No scleral icterus. Neck: Neck supple. No tracheal deviation present. Cardiovascular: Normal rate, regular rhythm and normal heart sounds. Exam reveals no gallop and no friction rub. No murmur heard. Pulmonary/Chest: Effort normal and breath sounds normal. No stridor. No respiratory distress. She has no wheezes. Abdominal: Soft. She exhibits no distension. Musculoskeletal: Normal range of motion. RIGHT ARM: diffusely tender to palpation. No edema, erythema, or apparent vascular congestion. 2+ radial pulse. Cap refill 3-5 seconds in all fingers on both hands. Both hands equal in color appearance and also in palpable warmth. Normal  strength. Neurological: She is alert and oriented to person, place, and time.    Skin: Skin is warm and dry. Psychiatric: She has a normal mood and affect. Her behavior is normal.   Nursing note and vitals reviewed. MDM  Number of Diagnoses or Management Options  Right arm pain:   Diagnosis management comments: 26year old male presenting to the ED for right arm pain, had transient episode of a cold hand earlier, strong radial pulse on arrival to the ED, both hands appear well-perfused, warm to touch with good cap refill. Pt with hx lupus, c/f possible UE DVT, negative doppler. Discussed with vascular who agrees no further evaluation needed at this time. Discussed possible MSK pain, will write for short course pain medicine, very specific return precautions given. Amount and/or Complexity of Data Reviewed  Clinical lab tests: ordered and reviewed  Tests in the radiology section of CPT®: ordered and reviewed  Discuss the patient with other providers: yes (Dr. Estrella Spann, ED attending. Dr. Lauren Barreto, vascular. )          ED Course       Procedures      Discussed with vascular surgery. Agreed that with hands that appear symmetric, strong radial pulse, brisk cap refill, equal in temperature bilaterally, no additional evaluation needed at this time. BECKIE Kothari  7:47 PM       reviewed, 17 Rx from 5 providers.   BECKIE Kothari  8:24 PM

## 2018-02-12 NOTE — ED TRIAGE NOTES
Triage: pt presents with right arm pain that travels up right arm to shoulder. Pt was referred here by MD to rule out blood clot. Right hand feels cooler to touch with increased cap refill time.

## 2018-02-13 NOTE — PROCEDURES
[facility]  *** FINAL REPORT ***    Name: Pinky Corral  MRN: [id number]    Outpatient  : 1985  HIS Order #: [id number]  [address] [facility] Visit #: [id number]  Date: 2018    TYPE OF TEST: Peripheral Venous Testing    REASON FOR TEST  Pain in limb    Right Arm:-  Deep venous thrombosis:           No  Superficial venous thrombosis:    No      INTERPRETATION/FINDINGS  PROCEDURE:  Color duplex ultrasound imaging of upper extremity veins. FINDINGS:       Right:  The internal jugular, subclavian, axillary, brachial,  basilic, and cephalic veins are patent and without evidence of  thrombus;  each is fully compressible and there is no narrowing of the   flow channel on color Doppler imaging. Phasic/pulsatile flow is  observed in the subclavian vein. Left:    The subclavian vein is patent and without evidence of  thrombus. Phasic/pulsatile flow is observed. This side was not  otherwise evaluated. IMPRESSION:  No evidence of right upper extremity vein thrombosis. ADDITIONAL COMMENTS    I have personally reviewed the data relevant to the interpretation of  this  study.     TECHNOLOGIST: Bren Agrawal RVT  Signed: 2018 07:25 PM    PHYSICIAN: Jeffrey Springer MD  Signed: 2018 08:05 AM

## 2018-02-13 NOTE — DISCHARGE INSTRUCTIONS
Arm Pain: Care Instructions  Your Care Instructions    You can hurt your arm by using it too much or by injuring it. Biking, wrestling, and home repair projects are examples of activities that can lead to arm pain. Everyday wear and tear, especially as you get older, can cause arm pain. Your forearms, wrists, hands, and fingers are the parts of your arm that are most likely to become painful. A minor arm injury usually will heal on its own with home treatment to relieve swelling and pain. If you have a more serious injury, you may need tests and treatment. Follow-up care is a key part of your treatment and safety. Be sure to make and go to all appointments, and call your doctor if you are having problems. It's also a good idea to know your test results and keep a list of the medicines you take. How can you care for yourself at home? · Take pain medicines exactly as directed. ¨ If the doctor gave you a prescription medicine for pain, take it as prescribed. ¨ If you are not taking a prescription pain medicine, ask your doctor if you can take an over-the-counter medicine. · Rest and protect your arm. Take a break from any activity that may cause pain. · Put ice or a cold pack on your arm for 10 to 20 minutes at a time. Put a thin cloth between the ice and your skin. · Prop up the sore arm on a pillow when you ice it or anytime you sit or lie down during the next 3 days. Try to keep it above the level of your heart. This will help reduce swelling. · If your doctor recommends a sling to support your arm, wear it as directed. When should you call for help? Call 911 anytime you think you may need emergency care. For example, call if:  ? · Your arm or hand is cool or pale or changes color. ?Call your doctor now or seek immediate medical care if:  ? · You cannot use your arm. ? · You have signs of infection, such as:  ¨ Increased pain, swelling, warmth, or redness.   ¨ Red streaks running up or down your arm.  ¨ Pus draining from an area of your arm. ¨ A fever. ? · You have tingling, weakness, or numbness in your arm. ? Watch closely for changes in your health, and be sure to contact your doctor if:  ? · You do not get better as expected. Where can you learn more? Go to http://quincy-quynh.info/. Enter B641 in the search box to learn more about \"Arm Pain: Care Instructions. \"  Current as of: March 20, 2017  Content Version: 11.4  © 8927-7148 [employer]. Care instructions adapted under license by [employer] (which disclaims liability or warranty for this information). If you have questions about a medical condition or this instruction, always ask your healthcare professional. [place] [address] any warranty or liability for your use of this information.

## 2018-03-22 ENCOUNTER — HOSPITAL ENCOUNTER (EMERGENCY)
Age: 33
Discharge: HOME | End: 2018-03-22
Attending: EMERGENCY MEDICINE | Admitting: EMERGENCY MEDICINE
Payer: COMMERCIAL

## 2018-03-22 ENCOUNTER — APPOINTMENT (OUTPATIENT)
Dept: CT IMAGING | Age: 33
End: 2018-03-22
Attending: EMERGENCY MEDICINE
Payer: COMMERCIAL

## 2018-03-22 VITALS
RESPIRATION RATE: 18 BRPM | TEMPERATURE: 98.6 F | HEIGHT: 66 IN | WEIGHT: 232 LBS | DIASTOLIC BLOOD PRESSURE: 85 MMHG | SYSTOLIC BLOOD PRESSURE: 135 MMHG | HEART RATE: 107 BPM | BODY MASS INDEX: 37.28 KG/M2 | OXYGEN SATURATION: 98 %

## 2018-03-22 DIAGNOSIS — G89.29 CHRONIC LOW BACK PAIN, UNSPECIFIED BACK PAIN LATERALITY, WITH SCIATICA PRESENCE UNSPECIFIED: Primary | ICD-10-CM

## 2018-03-22 DIAGNOSIS — M54.5 CHRONIC LOW BACK PAIN, UNSPECIFIED BACK PAIN LATERALITY, WITH SCIATICA PRESENCE UNSPECIFIED: Primary | ICD-10-CM

## 2018-03-22 LAB
ALBUMIN SERPL-MCNC: 3.8 G/DL (ref 3.5–5)
ALBUMIN/GLOB SERPL: 1 {RATIO} (ref 1.1–2.2)
ALP SERPL-CCNC: 98 U/L (ref 45–117)
ALT SERPL-CCNC: 29 U/L (ref 12–78)
ANION GAP SERPL CALC-SCNC: 8 MMOL/L (ref 5–15)
APPEARANCE UR: ABNORMAL
AST SERPL-CCNC: 24 U/L (ref 15–37)
BACTERIA URNS QL MICRO: ABNORMAL /HPF
BASOPHILS # BLD: 0.1 K/UL (ref 0–0.1)
BASOPHILS NFR BLD: 1 % (ref 0–1)
BILIRUB SERPL-MCNC: 0.5 MG/DL (ref 0.2–1)
BILIRUB UR QL: NEGATIVE
BUN SERPL-MCNC: 11 MG/DL (ref 6–20)
BUN/CREAT SERPL: 14 (ref 12–20)
CALCIUM SERPL-MCNC: 8.8 MG/DL (ref 8.5–10.1)
CHLORIDE SERPL-SCNC: 111 MMOL/L (ref 97–108)
CO2 SERPL-SCNC: 21 MMOL/L (ref 21–32)
COLOR UR: ABNORMAL
CREAT SERPL-MCNC: 0.81 MG/DL (ref 0.55–1.02)
DIFFERENTIAL METHOD BLD: NORMAL
EOSINOPHIL # BLD: 0.2 K/UL (ref 0–0.4)
EOSINOPHIL NFR BLD: 2 % (ref 0–7)
EPITH CASTS URNS QL MICRO: ABNORMAL /LPF
ERYTHROCYTE [DISTWIDTH] IN BLOOD BY AUTOMATED COUNT: 12.8 % (ref 11.5–14.5)
GLOBULIN SER CALC-MCNC: 3.9 G/DL (ref 2–4)
GLUCOSE SERPL-MCNC: 109 MG/DL (ref 65–100)
GLUCOSE UR STRIP.AUTO-MCNC: NEGATIVE MG/DL
HCT VFR BLD AUTO: 40.9 % (ref 35–47)
HGB BLD-MCNC: 14 G/DL (ref 11.5–16)
HGB UR QL STRIP: NEGATIVE
IMM GRANULOCYTES # BLD: 0 K/UL (ref 0–0.04)
IMM GRANULOCYTES NFR BLD AUTO: 0 % (ref 0–0.5)
KETONES UR QL STRIP.AUTO: NEGATIVE MG/DL
LEUKOCYTE ESTERASE UR QL STRIP.AUTO: ABNORMAL
LYMPHOCYTES # BLD: 2 K/UL (ref 0.8–3.5)
LYMPHOCYTES NFR BLD: 26 % (ref 12–49)
MCH RBC QN AUTO: 32.4 PG (ref 26–34)
MCHC RBC AUTO-ENTMCNC: 34.2 G/DL (ref 30–36.5)
MCV RBC AUTO: 94.7 FL (ref 80–99)
MONOCYTES # BLD: 0.5 K/UL (ref 0–1)
MONOCYTES NFR BLD: 6 % (ref 5–13)
NEUTS SEG # BLD: 5.1 K/UL (ref 1.8–8)
NEUTS SEG NFR BLD: 65 % (ref 32–75)
NITRITE UR QL STRIP.AUTO: NEGATIVE
NRBC # BLD: 0 K/UL (ref 0–0.01)
NRBC BLD-RTO: 0 PER 100 WBC
PH UR STRIP: 8 [PH] (ref 5–8)
PLATELET # BLD AUTO: 271 K/UL (ref 150–400)
PMV BLD AUTO: 9.4 FL (ref 8.9–12.9)
POTASSIUM SERPL-SCNC: 4 MMOL/L (ref 3.5–5.1)
PROT SERPL-MCNC: 7.7 G/DL (ref 6.4–8.2)
PROT UR STRIP-MCNC: ABNORMAL MG/DL
RBC # BLD AUTO: 4.32 M/UL (ref 3.8–5.2)
RBC #/AREA URNS HPF: ABNORMAL /HPF (ref 0–5)
SODIUM SERPL-SCNC: 140 MMOL/L (ref 136–145)
SP GR UR REFRACTOMETRY: 1.02 (ref 1–1.03)
UR CULT HOLD, URHOLD: NORMAL
UROBILINOGEN UR QL STRIP.AUTO: 0.2 EU/DL (ref 0.2–1)
WBC # BLD AUTO: 7.8 K/UL (ref 3.6–11)
WBC URNS QL MICRO: ABNORMAL /HPF (ref 0–4)

## 2018-03-22 PROCEDURE — 96374 THER/PROPH/DIAG INJ IV PUSH: CPT

## 2018-03-22 PROCEDURE — 96375 TX/PRO/DX INJ NEW DRUG ADDON: CPT

## 2018-03-22 PROCEDURE — 81001 URINALYSIS AUTO W/SCOPE: CPT | Performed by: PHYSICIAN ASSISTANT

## 2018-03-22 PROCEDURE — 74011250636 HC RX REV CODE- 250/636: Performed by: EMERGENCY MEDICINE

## 2018-03-22 PROCEDURE — 36415 COLL VENOUS BLD VENIPUNCTURE: CPT | Performed by: PHYSICIAN ASSISTANT

## 2018-03-22 PROCEDURE — 99284 EMERGENCY DEPT VISIT MOD MDM: CPT

## 2018-03-22 PROCEDURE — 80053 COMPREHEN METABOLIC PANEL: CPT | Performed by: PHYSICIAN ASSISTANT

## 2018-03-22 PROCEDURE — 72131 CT LUMBAR SPINE W/O DYE: CPT

## 2018-03-22 PROCEDURE — 85025 COMPLETE CBC W/AUTO DIFF WBC: CPT | Performed by: PHYSICIAN ASSISTANT

## 2018-03-22 RX ORDER — PREDNISONE 20 MG/1
40 TABLET ORAL DAILY
Qty: 8 TAB | Refills: 0 | Status: SHIPPED | OUTPATIENT
Start: 2018-03-22 | End: 2018-03-26

## 2018-03-22 RX ORDER — KETOROLAC TROMETHAMINE 30 MG/ML
30 INJECTION, SOLUTION INTRAMUSCULAR; INTRAVENOUS
Status: COMPLETED | OUTPATIENT
Start: 2018-03-22 | End: 2018-03-22

## 2018-03-22 RX ORDER — ONDANSETRON 2 MG/ML
4 INJECTION INTRAMUSCULAR; INTRAVENOUS
Status: COMPLETED | OUTPATIENT
Start: 2018-03-22 | End: 2018-03-22

## 2018-03-22 RX ADMIN — ONDANSETRON 4 MG: 2 INJECTION INTRAMUSCULAR; INTRAVENOUS at 15:54

## 2018-03-22 RX ADMIN — KETOROLAC TROMETHAMINE 30 MG: 30 INJECTION, SOLUTION INTRAMUSCULAR; INTRAVENOUS at 15:54

## 2018-03-22 RX ADMIN — METHYLPREDNISOLONE SODIUM SUCCINATE 125 MG: 125 INJECTION, POWDER, FOR SOLUTION INTRAMUSCULAR; INTRAVENOUS at 15:54

## 2018-03-22 NOTE — ED TRIAGE NOTES
Triage note: Pt states she had an abscess on her back in September and laminectomy. Pt has been experiencing lower back pain since starting physical therapy yesterday.

## 2018-03-22 NOTE — DISCHARGE INSTRUCTIONS
Back Pain, Emergency or Urgent Symptoms: Care Instructions  Your Care Instructions    Many people have back pain at one time or another. In most cases, pain gets better with self-care that includes over-the-counter pain medicine, ice, heat, and exercises. Unless you have symptoms of a severe injury or heart attack, you may be able to give yourself a few days before you call a doctor. But some back problems are very serious. Do not ignore symptoms that need to be checked right away. Follow-up care is a key part of your treatment and safety. Be sure to make and go to all appointments, and call your doctor if you are having problems. It's also a good idea to know your test results and keep a list of the medicines you take. How can you care for yourself at home? · Sit or lie in positions that are most comfortable and that reduce your pain. Try one of these positions when you lie down:  ¨ Lie on your back with your knees bent and supported by large pillows. ¨ Lie on the floor with your legs on the seat of a sofa or chair. Jurline Candy on your side with your knees and hips bent and a pillow between your legs. ¨ Lie on your stomach if it does not make pain worse. · Do not sit up in bed, and avoid soft couches and twisted positions. Bed rest can help relieve pain at first, but it delays healing. Avoid bed rest after the first day. · Change positions every 30 minutes. If you must sit for long periods of time, take breaks from sitting. Get up and walk around, or lie flat. · Try using a heating pad on a low or medium setting, for 15 to 20 minutes every 2 or 3 hours. Try a warm shower in place of one session with the heating pad. You can also buy single-use heat wraps that last up to 8 hours. You can also try ice or cold packs on your back for 10 to 20 minutes at a time, several times a day. (Put a thin cloth between the ice pack and your skin.) This reduces pain and makes it easier to be active and exercise.   · Take pain medicines exactly as directed. ¨ If the doctor gave you a prescription medicine for pain, take it as prescribed. ¨ If you are not taking a prescription pain medicine, ask your doctor if you can take an over-the-counter medicine. When should you call for help? Call 911 anytime you think you may need emergency care. For example, call if:  ? · You are unable to move a leg at all. ? · You have back pain with severe belly pain. ? · You have symptoms of a heart attack. These may include:  ¨ Chest pain or pressure, or a strange feeling in the chest.  ¨ Sweating. ¨ Shortness of breath. ¨ Nausea or vomiting. ¨ Pain, pressure, or a strange feeling in the back, neck, jaw, or upper belly or in one or both shoulders or arms. ¨ Lightheadedness or sudden weakness. ¨ A fast or irregular heartbeat. After you call 911, the  may tell you to chew 1 adult-strength or 2 to 4 low-dose aspirin. Wait for an ambulance. Do not try to drive yourself. ?Call your doctor now or seek immediate medical care if:  ? · You have new or worse symptoms in your arms, legs, chest, belly, or buttocks. Symptoms may include:  ¨ Numbness or tingling. ¨ Weakness. ¨ Pain. ? · You lose bladder or bowel control. ? · You have back pain and:  ¨ You have injured your back while lifting or doing some other activity. Call if the pain is severe, has not gone away after 1 or 2 days, and you cannot do your normal daily activities. ¨ You have had a back injury before that needed treatment. ¨ Your pain has lasted longer than 4 weeks. ¨ You have had weight loss you cannot explain. ¨ You are age 48 or older. ¨ You have cancer now or have had it before. ? Watch closely for changes in your health, and be sure to contact your doctor if you are not getting better as expected. Where can you learn more? Go to http://quincy-quynh.info/.   Enter M131 in the search box to learn more about \"Back Pain, Emergency or Urgent Symptoms: Care Instructions. \"  Current as of: March 20, 2017  Content Version: 11.4  © 9785-5732 [employer]. Care instructions adapted under license by [employer] (which disclaims liability or warranty for this information). If you have questions about a medical condition or this instruction, always ask your healthcare professional. [address] any warranty or liability for your use of this information. Chronic Pain: Care Instructions  Your Care Instructions    Chronic pain is pain that lasts a long time (months or even years) and may or may not have a clear cause. It is different from acute pain, which usually does have a clear cause-like an injury or illness-and gets better over time. Chronic pain:  · Lasts over time but may vary from day to day. · Does not go away despite efforts to end it. · May disrupt your sleep and lead to fatigue. · May cause depression or anxiety. · May make your muscles tense, causing more pain. · Can disrupt your work, hobbies, home life, and relationships with friends and family. Chronic pain is a very real condition. It is not just in your head. Treatment can help and usually includes several methods used together, such as medicines, physical therapy, exercise, and other treatments. Learning how to relax and changing negative thought patterns can also help you cope. Chronic pain is complex. Taking an active role in your treatment will help you better manage your pain. Tell your doctor if you have trouble dealing with your pain. You may have to try several things before you find what works best for you. Follow-up care is a key part of your treatment and safety. Be sure to make and go to all appointments, and call your doctor if you are having problems. It's also a good idea to know your test results and keep a list of the medicines you take. How can you care for yourself at home? · Pace yourself.  Break up large jobs into smaller tasks. Save harder tasks for days when you have less pain, or go back and forth between hard tasks and easier ones. Take rest breaks. · Relax, and reduce stress. Relaxation techniques such as deep breathing or meditation can help. · Keep moving. Gentle, daily exercise can help reduce pain over the long run. Try low- or no-impact exercises such as walking, swimming, and stationary biking. Do stretches to stay flexible. · Try heat, cold packs, and massage. · Get enough sleep. Chronic pain can make you tired and drain your energy. Talk with your doctor if you have trouble sleeping because of pain. · Think positive. Your thoughts can affect your pain level. Do things that you enjoy to distract yourself when you have pain instead of focusing on the pain. See a movie, read a book, listen to music, or spend time with a friend. · If you think you are depressed, talk to your doctor about treatment. · Keep a daily pain diary. Record how your moods, thoughts, sleep patterns, activities, and medicine affect your pain. You may find that your pain is worse during or after certain activities or when you are feeling a certain emotion. Having a record of your pain can help you and your doctor find the best ways to treat your pain. · Take pain medicines exactly as directed. ¨ If the doctor gave you a prescription medicine for pain, take it as prescribed. ¨ If you are not taking a prescription pain medicine, ask your doctor if you can take an over-the-counter medicine. Reducing constipation caused by pain medicine  · Include fruits, vegetables, beans, and whole grains in your diet each day. These foods are high in fiber. · Drink plenty of fluids, enough so that your urine is light yellow or clear like water. If you have kidney, heart, or liver disease and have to limit fluids, talk with your doctor before you increase the amount of fluids you drink. · If your doctor recommends it, get more exercise.  Walking is a good choice. Bit by bit, increase the amount you walk every day. Try for at least 30 minutes on most days of the week. · Schedule time each day for a bowel movement. A daily routine may help. Take your time and do not strain when having a bowel movement. When should you call for help? Call your doctor now or seek immediate medical care if:  ? · Your pain gets worse or is out of control. ? · You feel down or blue, or you do not enjoy things like you once did. You may be depressed, which is common in people with chronic pain. Depression can be treated. ? · You have vomiting or cramps for more than 2 hours. ? Watch closely for changes in your health, and be sure to contact your doctor if:  ? · You cannot sleep because of pain. ? · You are very worried or anxious about your pain. ? · You have trouble taking your pain medicine. ? · You have any concerns about your pain medicine. ? · You have trouble with bowel movements, such as:  ¨ No bowel movement in 3 days. ¨ Blood in the anal area, in your stool, or on the toilet paper. ¨ Diarrhea for more than 24 hours. Where can you learn more? Go to http://quincy-quynh.info/. Enter N004 in the search box to learn more about \"Chronic Pain: Care Instructions. \"  Current as of: October 14, 2016  Content Version: 11.4  © 6665-6191 [employer]. Care instructions adapted under license by [employer] (which disclaims liability or warranty for this information). If you have questions about a medical condition or this instruction, always ask your healthcare professional. [place] [address] any warranty or liability for your use of this information.

## 2018-03-22 NOTE — ED PROVIDER NOTES
Patient is a 26 y.o. female presenting with back pain. Back Pain    Pertinent negatives include no headaches, no abdominal pain and no dysuria. Pt reports that she suffers from chronic back pain. She had surgery in 2017 by Dr. Doreen Ramey for a lumbar spinal abscess and is presently under the care of spinal pain management. She states that the physical therapy, neurontin and flexeril are not working. Denies any blunt trauma, falls or new injury. Denies fever, rash, abdominal pain or urinary symptoms. Pain increases with bending, lifting and position changes. Gait is slow but steady; reflexes are normal. Pt drove herself here. Old charts reviewed. 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 Social History Main Topics    Smoking status: Former Smoker    Smokeless tobacco: Never Used    Alcohol use Yes      Comment: occasional    Drug use: No    Sexual activity: Not on file      Comment: richard has 2 children     Other Topics Concern    Not on file     Social History Narrative         ALLERGIES: Iodine and Vancomycin    Review of Systems   Constitutional: Negative for activity change and appetite change. HENT: Negative for facial swelling, sore throat and trouble swallowing. Eyes: Negative. Respiratory: Negative for shortness of breath. Cardiovascular: Negative.     Gastrointestinal: Negative for abdominal pain, diarrhea and vomiting. Genitourinary: Negative for dysuria. Musculoskeletal: Positive for back pain. Negative for neck pain. Skin: Negative for color change. Neurological: Negative for headaches. Psychiatric/Behavioral: Negative. Vitals:    03/22/18 1432   BP: 137/84   Pulse: (!) 106   Resp: 20   Temp: 98 °F (36.7 °C)   SpO2: 99%   Weight: 105.2 kg (232 lb)   Height: 5' 6\" (1.676 m)            Physical Exam   Constitutional: She is oriented to person, place, and time. She appears distressed. Obese white female; non smoker; teacher presently on disability   HENT:   Head: Normocephalic. Right Ear: External ear normal.   Left Ear: External ear normal.   Mouth/Throat: Oropharynx is clear and moist.   Eyes: Pupils are equal, round, and reactive to light. Neck: Normal range of motion. Neck supple. Cardiovascular: Normal rate and regular rhythm. Pulmonary/Chest: Effort normal and breath sounds normal.   Abdominal: Soft. Bowel sounds are normal. She exhibits no distension and no mass. There is no tenderness. There is no rebound and no guarding. Musculoskeletal: Normal range of motion. She exhibits tenderness. She exhibits no deformity. Well healed lumbar area surgical incisions; Skin integrity is intact. There is no obvious bony deformity, rash, bruising or extending erythema. Good neurovascular sensation. No apparent tendon or nerve injury. Neurological: She is alert and oriented to person, place, and time. Skin: Skin is warm and dry. No rash noted. Nursing note and vitals reviewed.        MDM  Number of Diagnoses or Management Options  Chronic low back pain, unspecified back pain laterality, with sciatica presence unspecified: established and worsening     Amount and/or Complexity of Data Reviewed  Clinical lab tests: ordered and reviewed  Decide to obtain previous medical records or to obtain history from someone other than the patient: yes  Review and summarize past medical records: yes    Risk of Complications, Morbidity, and/or Mortality  Presenting problems: moderate  Diagnostic procedures: moderate  Management options: moderate    Patient Progress  Patient progress: stable        ED Course       Procedures      Pt has been re-examined and reports minimal relief from the medications given. She was able to get in touch with her pain management specialist and they will see her in the office tomorrow. 5:26 PM  Patient's results and plan of care have been reviewed with her. Patient has verbally conveyed her understanding and agreement of her signs, symptoms, diagnosis, treatment and prognosis and additionally agrees to follow up as recommended or return to the Emergency Room should her condition change prior to follow-up. Discharge instructions have also been provided to the patient with some educational information regarding her diagnosis as well a list of reasons why she would want to return to the ER prior to her follow-up appointment should her condition change. Sean Bettencourt NP

## 2018-03-22 NOTE — ED NOTES

## 2018-07-25 ENCOUNTER — OFFICE VISIT (OUTPATIENT)
Dept: CARDIOLOGY CLINIC | Age: 33
End: 2018-07-25

## 2018-07-25 DIAGNOSIS — Z91.199 NO-SHOW FOR APPOINTMENT: Primary | ICD-10-CM

## 2019-01-21 ENCOUNTER — HOSPITAL ENCOUNTER (OUTPATIENT)
Dept: GENERAL RADIOLOGY | Age: 34
Discharge: HOME | End: 2019-01-21
Payer: COMMERCIAL

## 2019-01-21 DIAGNOSIS — M54.16 RADICULOPATHY, LUMBAR REGION: ICD-10-CM

## 2019-01-21 PROCEDURE — 72100 X-RAY EXAM L-S SPINE 2/3 VWS: CPT

## 2019-02-07 ENCOUNTER — HOSPITAL ENCOUNTER (OUTPATIENT)
Dept: MRI IMAGING | Age: 34
Discharge: HOME | End: 2019-02-07
Attending: INTERNAL MEDICINE
Payer: COMMERCIAL

## 2019-02-07 DIAGNOSIS — M54.16 LUMBAR RADICULOPATHY: ICD-10-CM

## 2019-02-07 PROCEDURE — 72158 MRI LUMBAR SPINE W/O & W/DYE: CPT

## 2019-02-07 PROCEDURE — A9575 INJ GADOTERATE MEGLUMI 0.1ML: HCPCS | Performed by: RADIOLOGY

## 2019-02-07 PROCEDURE — 74011250636 HC RX REV CODE- 250/636: Performed by: RADIOLOGY

## 2019-02-07 RX ORDER — GADOTERATE MEGLUMINE 376.9 MG/ML
20 INJECTION INTRAVENOUS
Status: COMPLETED | OUTPATIENT
Start: 2019-02-07 | End: 2019-02-07

## 2019-02-07 RX ADMIN — GADOTERATE MEGLUMINE 20 ML: 376.9 INJECTION INTRAVENOUS at 10:50

## 2019-08-21 ENCOUNTER — HOSPITAL ENCOUNTER (OUTPATIENT)
Dept: GENERAL RADIOLOGY | Age: 34
Discharge: HOME | End: 2019-08-21
Attending: PAIN MEDICINE
Payer: MEDICAID

## 2019-08-21 DIAGNOSIS — R52 PAIN: ICD-10-CM

## 2019-08-21 PROCEDURE — 72202 X-RAY EXAM SI JOINTS 3/> VWS: CPT

## 2019-11-20 PROBLEM — M54.50 LOW BACK PAIN: Status: ACTIVE | Noted: 2017-09-18

## 2020-06-03 PROBLEM — E66.01 SEVERE OBESITY (HCC): Status: ACTIVE | Noted: 2020-06-03

## 2022-03-18 PROBLEM — R82.71 BACTERIURIA: Status: ACTIVE | Noted: 2017-09-18

## 2022-03-18 PROBLEM — E87.6 HYPOKALEMIA: Status: ACTIVE | Noted: 2017-09-18

## 2022-03-19 PROBLEM — E66.9 OBESITY (BMI 30-39.9): Status: ACTIVE | Noted: 2017-09-18

## 2022-03-19 PROBLEM — E66.01 SEVERE OBESITY (HCC): Status: ACTIVE | Noted: 2020-06-03

## 2022-03-19 PROBLEM — M46.20 SPINAL ABSCESS (HCC): Status: ACTIVE | Noted: 2017-11-10

## 2022-03-20 PROBLEM — M54.50 LOW BACK PAIN: Status: ACTIVE | Noted: 2017-09-18

## 2022-10-30 NOTE — ED PROVIDER NOTES
HPI Comments: 26 yo female with hx of lupus here for evaluation of back pain. States she began with pain to right back x 1 week ago; seen and evaluated  and dx with sciatica. States tonight symptoms exacerbated with pain to right flank that radiates down right leg. Denies injury; no loss of bowel or bladder. +Chills. Pt on Plaquenil for lupus. Denies cough, CP, SOB, urinary symptoms. Former smoker. Patient is a 27 y.o. female presenting with back pain. The history is provided by the patient. Back Pain    This is a new problem. The current episode started more than 2 days ago. The problem has been gradually worsening. The pain is associated with no known injury. The quality of the pain is described as shooting and aching. The pain is at a severity of 9/10. The pain is moderate. Pertinent negatives include no chest pain, no numbness, no headaches, no abdominal pain, no bowel incontinence, no perianal numbness, no dysuria and no weakness. She has tried analgesics for the symptoms. Past Medical History:   Diagnosis Date    Lupus ([address]     PVC's (premature ventricular contractions)        Past Surgical History:   Procedure Laterality Date    HX  SECTION  2012    HX ORTHOPAEDIC Bilateral     knee    HX TONSILLECTOMY           History reviewed. No pertinent family history. Social History     Social History    Marital status: LEGALLY      Spouse name: N/A    Number of children: N/A    Years of education: N/A     Occupational History    Not on file. Social History Main Topics    Smoking status: Former Smoker    Smokeless tobacco: Never Used    Alcohol use Yes      Comment: occasional    Drug use: No    Sexual activity: Not on file     Other Topics Concern    Not on file     Social History Narrative         ALLERGIES: Iodine    Review of Systems   HENT: Negative for ear discharge. Eyes: Negative for photophobia, pain, discharge and visual disturbance. Respiratory: Negative for apnea, cough, chest tightness and shortness of breath. Cardiovascular: Negative for chest pain, palpitations and leg swelling. Gastrointestinal: Negative for abdominal distention, abdominal pain, blood in stool and bowel incontinence. Genitourinary: Positive for flank pain. Negative for difficulty urinating, dysuria, frequency and hematuria. Musculoskeletal: Positive for back pain. Negative for gait problem, joint swelling and neck pain. Skin: Negative for color change and pallor. Neurological: Negative for dizziness, syncope, weakness, numbness and headaches. Psychiatric/Behavioral: Negative for behavioral problems and confusion. The patient is not nervous/anxious. Vitals:    09/18/17 0151   BP: 142/86   Pulse: (!) 104   Resp: 20   Temp: (!) 100.8 °F (38.2 °C)   SpO2: 98%   Weight: 96.2 kg (212 lb)   Height: 5' 6\" (1.676 m)            Physical Exam   Constitutional: She is oriented to person, place, and time. She appears well-developed and well-nourished. She appears distressed (moderate; unable to get into position of comfort). HENT:   Head: Normocephalic and atraumatic. Right Ear: External ear normal.   Left Ear: External ear normal.   Nose: Nose normal.   Mouth/Throat: Oropharynx is clear and moist.   Eyes: Conjunctivae and EOM are normal. Pupils are equal, round, and reactive to light. Right eye exhibits no discharge. Left eye exhibits no discharge. Neck: Normal range of motion. Neck supple. Cardiovascular: Normal rate, regular rhythm, normal heart sounds and intact distal pulses. Pulmonary/Chest: Effort normal and breath sounds normal.   Abdominal: Soft. Bowel sounds are normal. She exhibits no distension. There is tenderness (Right flank). There is no rebound and no guarding. Musculoskeletal: Normal range of motion. She exhibits no edema. Lumbar back: She exhibits tenderness. She exhibits normal range of motion, no swelling and no edema. Back:    Neurological: She is alert and oriented to person, place, and time. No cranial nerve deficit. Coordination normal.   Skin: Skin is warm and dry. No rash noted. Psychiatric: She has a normal mood and affect. Her behavior is normal. Judgment and thought content normal.   Nursing note and vitals reviewed. MDM  Number of Diagnoses or Management Options  Fever, unspecified fever cause:   Immunocompromised ([address]):   Right-sided back pain, unspecified back location, unspecified chronicity:   Systemic lupus erythematosus, unspecified SLE type, unspecified organ involvement status [facility]):   Diagnosis management comments: 28 yo female immunicomprimised secondary to Lupus and Plaquenil with fever and WBC 16; ua and stone study negative; abd remains NON tender; Dr Vasyl Hope in to see; will admit for MRI of back (abscess) and abdomen (possibility of retrocecal appendix). Blood cultures sent. BECKIE Maxwell         Amount and/or Complexity of Data Reviewed  Clinical lab tests: ordered and reviewed  Tests in the radiology section of CPT®: ordered and reviewed  Discuss the patient with other providers: yes  Independent visualization of images, tracings, or specimens: yes      ED Course       Procedures    Patient has been reassessed. Pain remains 10/10; additional meds ordered. BECKIE Maxwell     Patient has been reassessed. Feeling better; still pain with any movement. Reviewed labs, medications and radiographics with patient. Agrees to admission for further evaluation. BECKIE Maxwell    Discussed case with attending Physician Vasyl Hope; in to see. Agrees with care and plan. BECKIE Maxwell     Spoke to Dr Charbel Wilkes; will admit.  BECKIE Maxwell Attending Attestation (For Attendings USE Only)...

## 2023-02-23 ENCOUNTER — OFFICE VISIT (OUTPATIENT)
Dept: ORTHOPEDIC SURGERY | Age: 38
End: 2023-02-23
Payer: MEDICAID

## 2023-02-23 VITALS — HEIGHT: 66 IN | BODY MASS INDEX: 40.18 KG/M2 | WEIGHT: 250 LBS

## 2023-02-23 DIAGNOSIS — M75.41 IMPINGEMENT SYNDROME OF RIGHT SHOULDER: ICD-10-CM

## 2023-02-23 DIAGNOSIS — M67.921 BICEPS TENDINOPATHY, RIGHT: ICD-10-CM

## 2023-02-23 DIAGNOSIS — M75.111 NONTRAUMATIC INCOMPLETE TEAR OF RIGHT ROTATOR CUFF: ICD-10-CM

## 2023-02-23 DIAGNOSIS — M75.01 ADHESIVE CAPSULITIS OF RIGHT SHOULDER: Primary | ICD-10-CM

## 2023-02-23 PROCEDURE — 99214 OFFICE O/P EST MOD 30 MIN: CPT | Performed by: ORTHOPAEDIC SURGERY

## 2023-02-23 RX ORDER — PANTOPRAZOLE SODIUM 40 MG/1
40 TABLET, DELAYED RELEASE ORAL DAILY
COMMUNITY

## 2023-02-23 NOTE — LETTER
2/23/2023    Patient: Clary Paniagua   YOB: 1985   Date of Visit: 2/23/2023     John Ruth MD  [address] [place] [address]    Dear John Ruth MD,      Thank you for referring Ms. Clary Paniagua to [facility] for evaluation. My notes for this consultation are attached. If you have questions, please do not hesitate to call me. I look forward to following your patient along with you.       Sincerely,    Soco Pan, DO

## 2023-02-23 NOTE — PROGRESS NOTES
Maddie Casas (: 1985) is a 34 y.o. female, established patient, here for evaluation of the following chief complaint(s):  Shoulder Pain       ASSESSMENT/PLAN:  Below is the assessment and plan developed based on review of pertinent history, physical exam, labs, studies, and medications. Findings were discussed with the patient today. We will obtain an MRI which will help us with further treatment planning including the possibility of surgical treatment. Patient will follow up after this MRI is performed. 1. Adhesive capsulitis of right shoulder  2. Biceps tendinopathy, right  3. Impingement syndrome of right shoulder  4. Nontraumatic incomplete tear of right rotator cuff      Return for imaging results after study performed. SUBJECTIVE/OBJECTIVE:  Maddie Casas (: 1985) is a 34 y.o. female. She presents today with sharp aching pain in the right shoulder. She notes significant limitations in range of motion and daily activities. She was initially following with an outside orthopedic surgeon for the last few months and her insurance changed. She has tried physical therapy, anti-inflammatories and activity modifications since October. She notes no relief. Symptoms are worsening. Allergies   Allergen Reactions    Iodine Anaphylaxis    Tizanidine Other (comments)    Vancomycin Rash       Current Outpatient Medications   Medication Sig    pantoprazole (PROTONIX) 40 mg tablet Take 40 mg by mouth daily. sertraline (ZOLOFT) 50 mg tablet     metoprolol tartrate (LOPRESSOR) 50 mg tablet Take 1 Tab by mouth two (2) times a day. buPROPion SR (WELLBUTRIN SR) 150 mg SR tablet Take 1 Tab by mouth two (2) times a day. tiZANidine (ZANAFLEX) 4 mg tablet TAKE ONE TABLET BY MOUTH EVERY NIGHT (Patient not taking: Reported on 2023)    DULoxetine (CYMBALTA) 30 mg capsule duloxetine 30 mg capsule,delayed release    ALPRAZolam (XANAX) 0.5 mg tablet Take  by mouth.     ondansetron hcl (ZOFRAN) 4 mg tablet Take 4 mg by mouth every eight (8) hours as needed for Nausea. influenza vaccine 2019-, 6 mos+,,PF, (FLUARIX/FLULAVAL/FLUZONE QUAD) syrg injection Fluarix Quad 3819-2243 (PF) 60 mcg (15 mcg x 4)/0.5 mL IM syringe    topiramate (TOPAMAX) 50 mg tablet Take 1 Tab by mouth two (2) times a day. hydroxychloroquine (PLAQUENIL) 200 mg tablet Take 200 mg by mouth two (2) times a day. cyclobenzaprine (FLEXERIL) 10 mg tablet Take 1 Tab by mouth three (3) times daily as needed for Muscle Spasm(s). L. acidoph & paracasei- S therm- Bifido (ELLEN-Q/RISAQUAD) 8 billion cell cap cap Take 1 Cap by mouth daily. No current facility-administered medications for this visit. Social History     Socioeconomic History    Marital status:      Spouse name: Not on file    Number of children: Not on file    Years of education: Not on file    Highest education level: Not on file   Occupational History    Occupation: NOT EMPLOYED   Tobacco Use    Smoking status: Former     Types: Cigarettes     Quit date:      Years since quitting: 15.1    Smokeless tobacco: Never   Substance and Sexual Activity    Alcohol use:  Yes     Alcohol/week: 2.0 standard drinks     Types: 1 Glasses of wine, 1 Cans of beer per week     Comment: occasional    Drug use: Yes     Frequency: 3.0 times per week     Types: Marijuana    Sexual activity: Not on file     Comment: richard has 2 children   Other Topics Concern    Not on file   Social History Narrative    Not on file     Social Determinants of Health     Financial Resource Strain: Not on file   Food Insecurity: Not on file   Transportation Needs: Not on file   Physical Activity: Not on file   Stress: Not on file   Social Connections: Not on file   Intimate Partner Violence: Not on file   Housing Stability: Not on file       Past Surgical History:   Procedure Laterality Date    HX  SECTION  2012    HX LUMBAR LAMINECTOMY  2017    HX ORTHOPAEDIC Bilateral     knee    HX TONSILLECTOMY      HX WISDOM TEETH EXTRACTION         Family History   Problem Relation Age of Onset    Diabetes Father     Diabetes Brother         OB History    No obstetric history on file. REVIEW OF SYSTEMS:    Patient denies any recent fever, chills, nausea, vomiting, chest pain, or shortness of breath. Vitals:  Ht 5' 6\" (1.676 m)   Wt 250 lb (113.4 kg)   BMI 40.35 kg/m²    Body mass index is 40.35 kg/m². PHYSICAL EXAM:  General exam: Patient is awake, alert, and oriented x3. Well-appearing. No acute distress. Ambulates with a normal gait. Heart/Lungs:  no respiratory distress, palpable pulses    Right shoulder: Neurovascular and sensory intact. There is tenderness palpation at the anterior lateral shoulder. Decreased range of motion is noted in all planes with 90 degrees of forward flexion and abduction. Normal stability. Rotator cuff strength testing was painful today. There is pain with impingement testing including Luna exam.  There is pain with biceps load test including speeds exam        IMAGING:  X-rays of the right shoulder from outside facility show minimal degenerative changes. No signs of fracture  XR Results (most recent):  Results from Hospital Encounter encounter on 08/21/19    XR SI JTS MIN 3 V    Narrative  INDICATION:  Sacral pain for 2 years. Injection 6 2 days ago with worsening pain  worse on the left    Exam: 3 views of the sacrum and SI joints. Comparison March 21, 2016. FINDINGS: Alignment is normal. Bone mineral density is normal. No evidence of  acute fracture or bony destructive change. T-shaped IUD overlies the mid pelvis. There is no narrowing or erosive change at the SI joints. .    Impression  Impression:  1. No acute bony abnormality. No evidence of acute or chronic sacroiliitis.       Results from [facility] encounter on 01/21/19    XR SPINE LUMB 2 OR 3 V    Narrative  EXAM:  XR SPINE LUMB 2 OR 3 V  INDICATION: Radiculopathy, lumbar region. COMPARISON: 9/18/2017. FINDINGS: AP, lateral and spot lateral views of the lumbar spine demonstrate  normal alignment. The vertebral body heights and disc spaces are  well-preserved. There is no fracture, subluxation or other acute abnormality. There is an intrauterine device in the pelvis. Impression  IMPRESSION: Normal lumbar spine. Results from [facility] encounter on 09/18/17    XR SPINE SNGL V (CROSS TABLE LAT)    Narrative  EXAM:  XR SPINE SNGL V (CROSS TABLE LAT)    INDICATION:    surgery    Intraoperative crosstable lateral view performed demonstrating surgical  instrument projecting at the inferior margin of the L3 posterior spinous  process. Impression  IMPRESSION: Intraoperative crosstable lateral view for localization. REPORT PROVIDED FOR COMPLIANCE ONLY AT NO CHARGE         No orders of the defined types were placed in this encounter. An electronic signature was used to authenticate this note.   -- Candace Garcia DO

## 2023-03-13 ENCOUNTER — OFFICE VISIT (OUTPATIENT)
Dept: ORTHOPEDIC SURGERY | Age: 38
End: 2023-03-13
Payer: MEDICAID

## 2023-03-13 VITALS — HEIGHT: 66 IN | WEIGHT: 250 LBS | BODY MASS INDEX: 40.18 KG/M2

## 2023-03-13 DIAGNOSIS — M75.01 ADHESIVE CAPSULITIS OF RIGHT SHOULDER: Primary | ICD-10-CM

## 2023-03-13 DIAGNOSIS — M54.12 CERVICAL RADICULOPATHY: ICD-10-CM

## 2023-03-13 PROCEDURE — 99214 OFFICE O/P EST MOD 30 MIN: CPT | Performed by: ORTHOPAEDIC SURGERY

## 2023-03-13 RX ORDER — GABAPENTIN 300 MG/1
300 CAPSULE ORAL
Qty: 30 CAPSULE | Refills: 0 | Status: SHIPPED | OUTPATIENT
Start: 2023-03-13

## 2023-03-13 RX ORDER — BUPROPION HYDROCHLORIDE 150 MG/1
TABLET ORAL
COMMUNITY
Start: 2023-03-09

## 2023-03-13 RX ORDER — PREDNISONE 20 MG/1
TABLET ORAL
COMMUNITY
Start: 2023-02-14

## 2023-03-13 RX ORDER — OXYCODONE AND ACETAMINOPHEN 5; 325 MG/1; MG/1
TABLET ORAL
COMMUNITY
Start: 2023-02-14

## 2023-03-13 RX ORDER — METHOCARBAMOL 500 MG/1
TABLET, FILM COATED ORAL
COMMUNITY
Start: 2023-03-06

## 2023-03-13 RX ORDER — METOPROLOL SUCCINATE 50 MG/1
TABLET, EXTENDED RELEASE ORAL
COMMUNITY
Start: 2023-03-06

## 2023-03-13 NOTE — PROGRESS NOTES
Latoya Paige (: 1985) is a 34 y.o. female, established patient, here for evaluation of the following chief complaint(s):  Shoulder Pain (Right shoulder MRI results)       ASSESSMENT/PLAN:  Below is the assessment and plan developed based on review of pertinent history, physical exam, labs, studies, and medications. We discussed possibility of right shoulder manipulation under anesthesia with corticosteroid injection followed by physical therapy regimen. However, she does have some obvious cervical radiculopathy symptoms. As she has failed conservative treatment we will obtain an MRI of the cervical spine for further treatment planning including possible epidural steroid injection. I will see her back after the MRI is performed. 1. Adhesive capsulitis of right shoulder  2. Cervical radiculopathy  -     gabapentin (NEURONTIN) 300 mg capsule; Take 1 Capsule by mouth nightly. Max Daily Amount: 300 mg., Normal, Disp-30 Capsule, R-0      No follow-ups on file. SUBJECTIVE/OBJECTIVE:  Latoya Paige (: 1985) is a 34 y.o. female. She presents today with continued right shoulder pain, stiffness, and radiating symptoms down the right upper extremity. She has tried ice, anti-inflammatories, activity modifications, and physical therapy for a number of months. She notes initially she was dealing with some neck stiffness but now it is just radiating pain. She notes numbness into the ulnar portion of the hand        Allergies   Allergen Reactions    Iodine Anaphylaxis    Tizanidine Other (comments)    Vancomycin Rash       Current Outpatient Medications   Medication Sig    methocarbamoL (ROBAXIN) 500 mg tablet     metoprolol succinate (TOPROL-XL) 50 mg XL tablet     predniSONE (DELTASONE) 20 mg tablet     oxyCODONE-acetaminophen (PERCOCET) 5-325 mg per tablet     buPROPion XL (WELLBUTRIN XL) 150 mg tablet     gabapentin (NEURONTIN) 300 mg capsule Take 1 Capsule by mouth nightly.  Max Daily Amount: 300 mg.    pantoprazole (PROTONIX) 40 mg tablet Take 40 mg by mouth daily. tiZANidine (ZANAFLEX) 4 mg tablet TAKE ONE TABLET BY MOUTH EVERY NIGHT    sertraline (ZOLOFT) 50 mg tablet     metoprolol tartrate (LOPRESSOR) 50 mg tablet Take 1 Tab by mouth two (2) times a day. influenza vaccine -, 6 mos+,,PF, (FLUARIX/FLULAVAL/FLUZONE QUAD) syrg injection Fluarix Quad 0937-2602 (PF) 60 mcg (15 mcg x 4)/0.5 mL IM syringe    L. acidoph & paracasei- S therm- Bifido (ELLEN-Q/RISAQUAD) 8 billion cell cap cap Take 1 Cap by mouth daily. No current facility-administered medications for this visit. Social History     Socioeconomic History    Marital status:      Spouse name: Not on file    Number of children: Not on file    Years of education: Not on file    Highest education level: Not on file   Occupational History    Occupation: NOT EMPLOYED   Tobacco Use    Smoking status: Former     Types: Cigarettes     Quit date:      Years since quitting: 15.2    Smokeless tobacco: Never   Vaping Use    Vaping Use: Never used   Substance and Sexual Activity    Alcohol use:  Yes     Alcohol/week: 2.0 standard drinks     Types: 1 Glasses of wine, 1 Cans of beer per week     Comment: occasional    Drug use: Yes     Frequency: 3.0 times per week     Types: Marijuana    Sexual activity: Not Currently     Comment: richard has 2 children   Other Topics Concern    Not on file   Social History Narrative    Not on file     Social Determinants of Health     Financial Resource Strain: Not on file   Food Insecurity: Not on file   Transportation Needs: Not on file   Physical Activity: Not on file   Stress: Not on file   Social Connections: Not on file   Intimate Partner Violence: Not on file   Housing Stability: Not on file       Past Surgical History:   Procedure Laterality Date    HX  SECTION  2012    HX LUMBAR LAMINECTOMY  2017    HX ORTHOPAEDIC Bilateral     knee    HX TONSILLECTOMY      HX WISDOM TEETH EXTRACTION         Family History   Problem Relation Age of Onset    Diabetes Father     Diabetes Brother         OB History    No obstetric history on file. REVIEW OF SYSTEMS:    Patient denies any recent fever, chills, nausea, vomiting, chest pain, or shortness of breath. Vitals:  Ht 5' 6\" (1.676 m)   Wt 250 lb (113.4 kg)   BMI 40.35 kg/m²    Body mass index is 40.35 kg/m². PHYSICAL EXAM:  General exam: Patient is awake, alert, and oriented x3. Well-appearing. No acute distress. Ambulates with a normal gait. Heart/Lungs:  no respiratory distress, palpable pulses    Neck: There is mild tenderness to palpation in the paraspinal region. No obvious midline tenderness to palpation. There is mild stiffness with flexion and extension of the cervical spine. Negative Spurling's exam.  No erythema or ecchymosis. Right shoulder: There is decreased sensation to the ulnar nerve distribution of the hand there is tenderness palpation at the anterior lateral shoulder. Decreased range of motion is noted in all planes with 90 degrees of forward flexion and abduction. Normal stability. Rotator cuff strength testing was painful today. There is pain with impingement testing including Luna exam.        IMAGING:  Previous imaging of the cervical spine was reviewed and shows normal disc spaces with no signs of acute fracture. Disc bulge noted. MRI Results (most recent):  Results from Appointment encounter on 03/08/23    MRI SHOULDER RT WO CONT    Narrative  EXAM: MRI SHOULDER RT WO CONT    INDICATION: Right shoulder pain    COMPARISON: None    TECHNIQUE: Axial proton density fat-saturated; oblique coronal T1, T2  fat-saturated, and proton density fat-saturated; and oblique sagittal T2  fat-saturated MRI of the right shoulder . CONTRAST: None. FINDINGS: A.C. joint: Minimal degenerative change Anterior acromion process  type: 2    Bone marrow: Within normal limits.  No acute fracture, dislocation, or marrow  replacing process. Joint fluid: None. Rotator cuff tendons: Focal tendinopathy of the anterior supraspinatus  insertion. Infraspinatus and teres minor are intact. Small high-grade  partial-thickness undersurface tear superior subscapularis    Biceps tendon: Intact and located within the bicipital groove. Muscles: No edema or atrophy. Glenoid labrum: Intact. Glenohumeral joint capsule: within normal limits. Glenohumeral articular cartilage: Intact. No focal osteochondral lesion. Soft tissue mass: None. Impression  1. Small high-grade partial-thickness undersurface tear superior subscapularis  2. Focal high-grade tendinopathy anterior spinatus insertion      XR Results (most recent):  Results from [facility] encounter on 08/21/19    XR SI JTS MIN 3 V    Narrative  INDICATION:  Sacral pain for 2 years. Injection 6 2 days ago with worsening pain  worse on the left    Exam: 3 views of the sacrum and SI joints. Comparison March 21, 2016. FINDINGS: Alignment is normal. Bone mineral density is normal. No evidence of  acute fracture or bony destructive change. T-shaped IUD overlies the mid pelvis. There is no narrowing or erosive change at the SI joints. .    Impression  Impression:  1. No acute bony abnormality. No evidence of acute or chronic sacroiliitis. Results from [facility] encounter on 01/21/19    XR SPINE LUMB 2 OR 3 V    Narrative  EXAM:  XR SPINE LUMB 2 OR 3 V  INDICATION: Radiculopathy, lumbar region. COMPARISON: 9/18/2017. FINDINGS: AP, lateral and spot lateral views of the lumbar spine demonstrate  normal alignment. The vertebral body heights and disc spaces are  well-preserved. There is no fracture, subluxation or other acute abnormality. There is an intrauterine device in the pelvis. Impression  IMPRESSION: Normal lumbar spine.       Results from [facility] encounter on 09/18/17    XR SPINE SNGL V (CROSS TABLE LAT)    Narrative  EXAM:  XR SPINE SNGL V (CROSS TABLE LAT)    INDICATION:    surgery    Intraoperative crosstable lateral view performed demonstrating surgical  instrument projecting at the inferior margin of the L3 posterior spinous  process. Impression  IMPRESSION: Intraoperative crosstable lateral view for localization. REPORT PROVIDED FOR COMPLIANCE ONLY AT NO CHARGE         Orders Placed This Encounter    gabapentin (NEURONTIN) 300 mg capsule     Sig: Take 1 Capsule by mouth nightly. Max Daily Amount: 300 mg. Dispense:  30 Capsule     Refill:  0              An electronic signature was used to authenticate this note.   -- Tk Mcneal, DO

## 2023-03-13 NOTE — PATIENT INSTRUCTIONS
Date of appointment:  3/13/2023     Examining Physician: Medhat Boyer      Employee information    Name:  Toni Mcneal                                          YOB: 1985                               Medical record number: [id number]      Company information      Reason for visit: Cervical radiculopathy, right shoulder adhesive capsulitis    Follow-up Requested:   Following Test MRI    Treatment: Anti-Inflammatory medication    Work Status Restrictions: May not return to work until seen again      Signed: Antonio Villagomez DO

## 2023-03-17 RX ORDER — TRAMADOL HYDROCHLORIDE 50 MG/1
50 TABLET ORAL
Qty: 15 TABLET | Refills: 0 | Status: SHIPPED | OUTPATIENT
Start: 2023-03-17 | End: 2023-03-24

## 2023-03-23 ENCOUNTER — OFFICE VISIT (OUTPATIENT)
Dept: ORTHOPEDIC SURGERY | Age: 38
End: 2023-03-23
Payer: MEDICAID

## 2023-03-23 VITALS — HEIGHT: 66 IN | BODY MASS INDEX: 40.18 KG/M2 | WEIGHT: 250 LBS

## 2023-03-23 DIAGNOSIS — M75.01 ADHESIVE CAPSULITIS OF RIGHT SHOULDER: Primary | ICD-10-CM

## 2023-03-23 DIAGNOSIS — M54.12 CERVICAL RADICULOPATHY: ICD-10-CM

## 2023-03-23 PROCEDURE — 99214 OFFICE O/P EST MOD 30 MIN: CPT | Performed by: ORTHOPAEDIC SURGERY

## 2023-03-23 RX ORDER — TRAMADOL HYDROCHLORIDE 50 MG/1
50 TABLET ORAL
Qty: 15 TABLET | Refills: 0 | Status: SHIPPED | OUTPATIENT
Start: 2023-03-23 | End: 2023-03-30

## 2023-03-23 NOTE — LETTER
3/23/2023    Patient: Ceferino Logan   YOB: 1985   Date of Visit: 3/23/2023     Maria Antonia Leon MD  [address] [place] [address]    Dear Maria Antonia Leon MD,      Thank you for referring Ms. Ceferino Logan to [facility] for evaluation. My notes for this consultation are attached. If you have questions, please do not hesitate to call me. I look forward to following your patient along with you.       Sincerely,    Merced Carvalho, DO

## 2023-03-23 NOTE — PROGRESS NOTES
Sandrine David (: 1985) is a 34 y.o. female, established patient, here for evaluation of the following chief complaint(s):  Neck Pain       ASSESSMENT/PLAN:  Below is the assessment and plan developed based on review of pertinent history, physical exam, labs, studies, and medications. Findings were discussed with the patient today. We discussed her continued cervical radiculopathy symptoms. She also has some de Quervain's tenosynovitis symptoms in the right upper extremity today. However, her right shoulder stiffness and pain continues to be the most significant of her issues. She would like to plan for right shoulder manipulation under anesthesia with corticosteroid injection. We discussed that she will need to start physical therapy same day or next day after her manipulation is performed. 1. Adhesive capsulitis of right shoulder  -     traMADoL (ULTRAM) 50 mg tablet; Take 1 Tablet by mouth every six (6) hours as needed for Pain for up to 7 days. Max Daily Amount: 200 mg., Normal, Disp-15 Tablet, R-0  2. Cervical radiculopathy      No follow-ups on file. SUBJECTIVE/OBJECTIVE:  Sandrine David (: 1985) is a 34 y.o. female. General she presents today with continued right shoulder stiffness and pain. She also notes radiating pain from the neck down the right upper extremity. She notes pain in the wrist today as well. Allergies   Allergen Reactions    Iodine Anaphylaxis    Tizanidine Other (comments)    Vancomycin Rash       Current Outpatient Medications   Medication Sig    traMADoL (ULTRAM) 50 mg tablet Take 1 Tablet by mouth every six (6) hours as needed for Pain for up to 7 days. Max Daily Amount: 200 mg.    traMADoL (ULTRAM) 50 mg tablet Take 1 Tablet by mouth every six (6) hours as needed for Pain for up to 7 days.  Max Daily Amount: 200 mg.    methocarbamoL (ROBAXIN) 500 mg tablet     metoprolol succinate (TOPROL-XL) 50 mg XL tablet     predniSONE (DELTASONE) 20 mg tablet oxyCODONE-acetaminophen (PERCOCET) 5-325 mg per tablet     buPROPion XL (WELLBUTRIN XL) 150 mg tablet     gabapentin (NEURONTIN) 300 mg capsule Take 1 Capsule by mouth nightly. Max Daily Amount: 300 mg.    pantoprazole (PROTONIX) 40 mg tablet Take 40 mg by mouth daily. tiZANidine (ZANAFLEX) 4 mg tablet TAKE ONE TABLET BY MOUTH EVERY NIGHT    sertraline (ZOLOFT) 50 mg tablet     metoprolol tartrate (LOPRESSOR) 50 mg tablet Take 1 Tab by mouth two (2) times a day. influenza vaccine 2019-20, 6 mos+,,PF, (FLUARIX/FLULAVAL/FLUZONE QUAD) syrg injection Fluarix Quad 3329-3161 (PF) 60 mcg (15 mcg x 4)/0.5 mL IM syringe    L. acidoph & paracasei- S therm- Bifido (ELLEN-Q/RISAQUAD) 8 billion cell cap cap Take 1 Cap by mouth daily. No current facility-administered medications for this visit. Social History     Socioeconomic History    Marital status:      Spouse name: Not on file    Number of children: Not on file    Years of education: Not on file    Highest education level: Not on file   Occupational History    Occupation: NOT EMPLOYED   Tobacco Use    Smoking status: Former     Types: Cigarettes     Quit date: 2008     Years since quitting: 15.2    Smokeless tobacco: Never   Vaping Use    Vaping Use: Never used   Substance and Sexual Activity    Alcohol use:  Yes     Alcohol/week: 2.0 standard drinks     Types: 1 Glasses of wine, 1 Cans of beer per week     Comment: occasional    Drug use: Yes     Frequency: 3.0 times per week     Types: Marijuana    Sexual activity: Not Currently     Comment: richard has 2 children   Other Topics Concern    Not on file   Social History Narrative    Not on file     Social Determinants of Health     Financial Resource Strain: Not on file   Food Insecurity: Not on file   Transportation Needs: Not on file   Physical Activity: Not on file   Stress: Not on file   Social Connections: Not on file   Intimate Partner Violence: Not on file   Housing Stability: Not on file Past Surgical History:   Procedure Laterality Date    HX  SECTION  2012    HX LUMBAR LAMINECTOMY  2017    HX ORTHOPAEDIC Bilateral     knee    HX TONSILLECTOMY      HX WISDOM TEETH EXTRACTION         Family History   Problem Relation Age of Onset    Diabetes Father     Diabetes Brother         OB History    No obstetric history on file. REVIEW OF SYSTEMS:  ROS    Positive for: Musculoskeletal  Last edited by Freddie Candelario on 3/23/2023 10:36 AM.        Patient denies any recent fever, chills, nausea, vomiting, chest pain, or shortness of breath. Vitals:  Ht 5' 6\" (1.676 m)   Wt 250 lb (113.4 kg)   BMI 40.35 kg/m²    Body mass index is 40.35 kg/m². PHYSICAL EXAM:  General exam: Patient is awake, alert, and oriented x3. Well-appearing. No acute distress. Ambulates with a normal gait. Heart/Lungs:  no respiratory distress, palpable pulses    Neck: There is tenderness to palpation in the paraspinal region. No obvious midline tenderness to palpation. There is stiffness with flexion and extension of the cervical spine. Negative Spurling's exam.  No erythema or ecchymosis. Right shoulder: Neurovascular and sensory intact. There is tenderness palpation at the anterior lateral shoulder. Decreased range of motion is noted in all planes with 90 degrees of forward flexion and abduction. Normal stability. Rotator cuff strength testing was painful today. There is pain with impingement testing including Luna exam.  There is tenderness palpation at the radial border of the wrist and pain with Cookie Mcclelland testing today. IMAGING:    XR Results (maximum last 3): Results from [place] encounter on 19    XR SI JTS MIN 3 V    Narrative  INDICATION:  Sacral pain for 2 years. Injection 6 2 days ago with worsening pain  worse on the left    Exam: 3 views of the sacrum and SI joints. Comparison 2016.     FINDINGS: Alignment is normal. Bone mineral density is normal. No evidence of  acute fracture or bony destructive change. T-shaped IUD overlies the mid pelvis. There is no narrowing or erosive change at the SI joints. .    Impression  Impression:  1. No acute bony abnormality. No evidence of acute or chronic sacroiliitis. Results from [place] [facility] encounter on 01/21/19    XR SPINE LUMB 2 OR 3 V    Narrative  EXAM:  XR SPINE LUMB 2 OR 3 V  INDICATION: Radiculopathy, lumbar region. COMPARISON: 9/18/2017. FINDINGS: AP, lateral and spot lateral views of the lumbar spine demonstrate  normal alignment. The vertebral body heights and disc spaces are  well-preserved. There is no fracture, subluxation or other acute abnormality. There is an intrauterine device in the pelvis. Impression  IMPRESSION: Normal lumbar spine. Results from [place] [facility] encounter on 09/18/17    XR SPINE SNGL V (CROSS TABLE LAT)    Narrative  EXAM:  XR SPINE SNGL V (CROSS TABLE LAT)    INDICATION:    surgery    Intraoperative crosstable lateral view performed demonstrating surgical  instrument projecting at the inferior margin of the L3 posterior spinous  process. Impression  IMPRESSION: Intraoperative crosstable lateral view for localization. REPORT PROVIDED FOR COMPLIANCE ONLY AT NO CHARGE      CT Results (maximum last 3): Results from [place] [facility] encounter on 03/22/18    CT SPINE LUMB WO CONT    Narrative  EXAM:  CT SPINE LUMB WO CONT    INDICATION:  increased pain post surgical    COMPARISON: MRI 1/5/2018. TECHNIQUE:   Unenhanced multislice helical CT of the lumbar spine was performed  in the axial plane. Coronal and sagittal reconstructions were obtained. CT  dose reduction was achieved through use of a standardized protocol tailored for  this examination and automatic exposure control for dose modulation. FINDINGS:    Alignment of the vertebral bodies is within normal limits. There is no evidence  of fracture.  Posterior laminectomy is noted from the level of L3-L4. Paraspinal  soft tissues are unremarkable other than a tiny nonobstructing left renal  calculus. . No abnormal fluid collection noted. Disc spaces are maintained. There  is no evidence of lytic or sclerotic lesion. T12-L1: The spinal canal and neural foramina are widely patent. L1-L2:   The spinal canal and neural foramina are widely patent. L2-L3:   The spinal canal and neural foramina are widely patent. L3-L4:   The spinal canal and neural foramina are widely patent. L4-L5:   The spinal canal and neural foramina are widely patent. L5-S1:   The spinal canal and neural foramina are widely patent. Impression  IMPRESSION:   No evidence of disc herniation or spinal stenosis. No evidence of  discitis or osteomyelitis. Results from [place] [facility] encounter on 11/10/17    CT FINE NDL ASPIR W IMAGE    Narrative  EXAM:  CT FINE NDL ASPIR W IMAGE  INDICATION: L5-S1 left paraspinal cyst.    Procedure: CT guided cyst aspiration. Preprocedure diagnosis: Synovial cyst.  Postprocedure diagnosis:  Synovial cyst.  Primary physician: Ashlie Elizabeth M.D. Assistants: None. Sedation: Versed and fentanyl. Specimens removed: 2 cc of fluid. Cultures sent: Yes. Implanted devices: None. Estimated blood loss: Minimal.  Complications: None. Discharge: To nursing unit. CT dose reduction was achieved through the use of a standardized protocol  tailored for this examination and automatic exposure control for dose  modulation. FINDINGS: The procedure including the risk of bleeding and infection  was  explained to the patient and verbal and written informed consent was obtained. The patient was placed into the CT scanner in the prone position, images were  obtained  and a site was localized for drainage of the L5-S1 paraspinal cyst.  The skin was marked and prepped and draped in sterile fashion and anesthetized  with 1% lidocaine.  A OneStep sheathed needle was advanced into the collection  and 2 cc of clear fluid were were aspirated. Attempts to reposition the needle  to get more fluid yielded a small amount of blood. Specimens were sent to the  laboratory for Gram stain and culture. The patient was monitored by the radiology nurse throughout the procedure with  pulse oximetry and EKG monitoring and Versed and fentanyl were administered for  anxiolysis and pain control. The patient tolerated the procedure well without  apparent complication and will be recovered in the radiology holding unit and  transferred back to the nursing unit when stable. Impression  IMPRESSION: CT guided aspiration performed. Laboratory results pending. Results from [place] [facility] encounter on 09/18/17    CT ABD PELV WO CONT    Narrative  EXAM: CT ABDOMEN AND PELVIS WITHOUT CONTRAST  INDICATION:  Flank pain. COMPARISON: None. CONTRAST: None. TECHNIQUE:  Unenhanced multislice helical CT was performed from the diaphragm to the  symphysis pubis without intravenous contrast administration. Oral contrast was  not administered. Contiguous 5 mm axial images were reconstructed and lung and  soft tissue windows were generated. Coronal and sagittal reformations were  generated. CT dose reduction was achieved through use of a standardized protocol  tailored for this examination and automatic exposure control for dose  modulation. FINDINGS:  The patient is morbidly obese. LOWER CHEST: The visualized portions of the lung bases are clear. The absence of intravenous contrast material reduces the sensitivity for  evaluation of the solid parenchymal organs of the abdomen. ABDOMEN:  Liver: The liver is normal in size and contour with no focal abnormality. Gallbladder and bile ducts: There are no calcified stones and there is no  biliary duct dilatation. Spleen: No abnormality. Pancreas: No abnormality. Adrenal glands: No abnormality. Kidneys: No focal parenchymal abnormality. There are 2 small nonobstructing left  renal calculus. PELVIS:  Reproductive organs: The uterus contains an intrauterine device. Bladder: No abnormality. RETROPERITONEUM: The aorta tapers without aneurysm. There is no retroperitoneal  adenopathy or mass. There is no pelvic mass or adenopathy. BOWEL AND MESENTERY: The small bowel is normal. The appendix is not identified. PERITONEUM: There is no ascites or free intraperitoneal air. BONES AND SOFT TISSUES: The bones and soft tissues of the abdominal wall are  within normal limits. Impression  IMPRESSION:  1. No acute abdominal or pelvic abnormality. 2. Small nonobstructing left renal calculi. 3. Intrauterine device. MRI Results (maximum last 3): Results from Appointment encounter on 03/08/23    MRI SHOULDER RT WO CONT    Narrative  EXAM: MRI SHOULDER RT WO CONT    INDICATION: Right shoulder pain    COMPARISON: None    TECHNIQUE: Axial proton density fat-saturated; oblique coronal T1, T2  fat-saturated, and proton density fat-saturated; and oblique sagittal T2  fat-saturated MRI of the right shoulder . CONTRAST: None. FINDINGS: A.C. joint: Minimal degenerative change Anterior acromion process  type: 2    Bone marrow: Within normal limits. No acute fracture, dislocation, or marrow  replacing process. Joint fluid: None. Rotator cuff tendons: Focal tendinopathy of the anterior supraspinatus  insertion. Infraspinatus and teres minor are intact. Small high-grade  partial-thickness undersurface tear superior subscapularis    Biceps tendon: Intact and located within the bicipital groove. Muscles: No edema or atrophy. Glenoid labrum: Intact. Glenohumeral joint capsule: within normal limits. Glenohumeral articular cartilage: Intact. No focal osteochondral lesion. Soft tissue mass: None. Impression  1. Small high-grade partial-thickness undersurface tear superior subscapularis  2.  Focal high-grade tendinopathy anterior spinatus insertion      Results from [place] encounter on 02/07/19    MRI LUMB SPINE W WO CONT    Narrative  INDICATION:  Lumbar radiculopathy    COMPARISON: January 5, 2018    TECHNIQUE: Multiplanar multisequence acquisition of the lumbar spine. CONTRAST:    Pre and post contrast imaging was performed using gadolinium    FINDINGS:    ALIGNMENT:  Normal.  VERTEBRAL BODIES:  No compression fracture. MARROW SIGNAL:  No significant edema. SPINAL CORD:  Terminates at L1.  ADDITIONAL COMMENTS:  No abnormal intraspinal enhancement. Prior laminectomies  at L3 and L4 with mild soft tissue scarring in the lower back. No evidence of  abnormal subcutaneous or epidural fluid collection. L1/2:  The spinal canal and foramina are widely patent. L2/3:  The spinal canal and foramina are widely patent. L3/4:  Prior laminectomy. Mild facet degeneration. No significant spinal canal  stenosis. Minimal if any foraminal stenosis. L4/5:  Prior laminectomy. Mild disc bulge and facet degeneration. No significant  spinal canal stenosis. Mild if any bilateral foraminal stenosis. L5/S1:  The spinal canal and foramina are widely patent. Mild facet  degeneration. Mild residual signal abnormality/enhancement right L5-S1 facet  joint. Impression  IMPRESSION:  Postsurgical changes L3-4 with no evidence of epidural or subcutaneous fluid  collection. No evidence of discitis/osteomyelitis. Minimal degenerative changes  as above. Results from Hospital Encounter encounter on 01/05/18    MRI LUMB SPINE W WO CONT    Narrative  EXAM:  MRI LUMB SPINE W WO CONT  Clinical history: Lumbar radiculopathy  INDICATION:  Lumbar radiculopathy. COMPARISON: 11/10/2017    TECHNIQUE: MR imaging of the lumbar spine was performed using the following  sequences: sagittal T1, T2, STIR;  axial T1, T2 prior to and following contrast  administration. CONTRAST: 20 mL of ProHance.     FINDINGS:    The distal spinal cord has normal signal. There is no abnormal intradural  enhancement. Spinal canal has been adequately decompressed and there is no evidence of  residual/recurrent epidural abscess. Relatively minimal disc generative disc  findings and disc bulging with out evidence of disc herniation. No evidence of discitis or osteomyelitis involving the vertebral bodies. Diminished facet joint effusion on the right at L5-S1. Resolved fluid collection  posterior to the facet joint on the right at L5-S1. Slight increase in degree of  enhancement of the paraspinal musculature. No other focal fluid collections or  findings of residual or recurrent abscess demonstrated. Impression  IMPRESSION:  1. No unusual postoperative findings related to posterior laminectomy and  treatment of posterior epidural abscess. 2. A ministered fluid/edema at the facet joint on the right at L5-S1. Resolved  tiny collection. The degree of postcontrast enhancement is slightly increased  compared to prior study. No new/residual collection. Nuclear Medicine Results (maximum last 3): Results from [place] encounter on 03/20/15    NM LUNG PERFUSION W VENT    Narrative  **Final Report**      ICD Codes / Adm. Diagnosis: 040214  [id number] / Palpitations  chest palpitations  Examination:  NM LUNG PERFUSION [facility]  - [id number] - Mar 20 2015  5:30PM  Accession No:  [id number]  Reason:  palp/ elevated d-dimer      REPORT:  Indication: Chest palpitations    A V/Q scan was performed with 7 mCi xenon 133 inhaled and 4.4 mCi Tc MAA  injected intravenously. Ventilation demonstratesno significant delay in  washout. Perfusion images demonstrates no segmental abnormalities    . Impression  :  1. Normal.          Signing/Reading Doctor: Elisa Jeffries ([id number])  Approved: Elisa Jeffries ([id number])  Mar 20 2015  5:53PM      US Results (maximum last 3): No results found for this or any previous visit. DEXA Results (maximum last 3):   No results found for this or any previous visit.      DAYRON Results (maximum last 3): No results found for this or any previous visit. IR Results (maximum last 3): No results found for this or any previous visit. VAS/US Results (maximum last 3): Results from Hospital Encounter encounter on 18    DUPLEX UPPER EXT [address]       Name: Suzy Dorantes  MRN: [id number]    Outpatient  : 1985  [facility]IS Order #: [id number]  [id number] [address] Visit #: [id number]  Date: 2018    TYPE OF TEST: Peripheral Venous Testing    REASON FOR TEST  Pain in limb    Right Arm:-  Deep venous thrombosis:           No  Superficial venous thrombosis:    No      INTERPRETATION/FINDINGS  PROCEDURE:  Color duplex ultrasound imaging of upper extremity veins. FINDINGS:  Right:  The internal jugular, subclavian, axillary, brachial,  basilic, and cephalic veins are patent and without evidence of  thrombus;  each is fully compressible and there is no narrowing of the  flow channel on color Doppler imaging. Phasic/pulsatile flow is  observed in the subclavian vein. Left:    The subclavian vein is patent and without evidence of  thrombus. Phasic/pulsatile flow is observed. This side was not  otherwise evaluated. Impression  :  No evidence of right upper extremity vein thrombosis. ADDITIONAL COMMENTS    I have personally reviewed the data relevant to the interpretation of  this  study. TECHNOLOGIST: Braulio Lopez RVT  Signed: 2018 07:25 PM    PHYSICIAN: Alysia Condon MD  Signed: 2018 08:05 AM      PET Results (maximum last 3): No results found for this or any previous visit. XR Results (most recent):  Results from Hospital Encounter encounter on 19    XR SI JTS MIN 3 V    Narrative  INDICATION:  Sacral pain for 2 years. Injection 6 2 days ago with worsening pain  worse on the left    Exam: 3 views of the sacrum and SI joints. Comparison 2016.     FINDINGS: Alignment is normal. Bone mineral density is normal. No evidence of  acute fracture or bony destructive change. T-shaped IUD overlies the mid pelvis. There is no narrowing or erosive change at the SI joints. .    Impression  Impression:  1. No acute bony abnormality. No evidence of acute or chronic sacroiliitis. Results from [place] encounter on 01/21/19    XR SPINE LUMB 2 OR 3 V    Narrative  EXAM:  XR SPINE LUMB 2 OR 3 V  INDICATION: Radiculopathy, lumbar region. COMPARISON: 9/18/2017. FINDINGS: AP, lateral and spot lateral views of the lumbar spine demonstrate  normal alignment. The vertebral body heights and disc spaces are  well-preserved. There is no fracture, subluxation or other acute abnormality. There is an intrauterine device in the pelvis. Impression  IMPRESSION: Normal lumbar spine. Results from [place] encounter on 09/18/17    XR SPINE SNGL V (CROSS TABLE LAT)    Narrative  EXAM:  XR SPINE SNGL V (CROSS TABLE LAT)    INDICATION:    surgery    Intraoperative crosstable lateral view performed demonstrating surgical  instrument projecting at the inferior margin of the L3 posterior spinous  process. Impression  IMPRESSION: Intraoperative crosstable lateral view for localization. REPORT PROVIDED FOR COMPLIANCE ONLY AT NO CHARGE         Orders Placed This Encounter    traMADoL (ULTRAM) 50 mg tablet     Sig: Take 1 Tablet by mouth every six (6) hours as needed for Pain for up to 7 days. Max Daily Amount: 200 mg. Dispense:  15 Tablet     Refill:  0              An electronic signature was used to authenticate this note.   -- Raul Easton DO

## 2024-05-06 NOTE — PROGRESS NOTES
Problem: Mobility Impaired (Adult and Pediatric)  Goal: *Acute Goals and Plan of Care (Insert Text)  Physical Therapy Goals  Initiated 9/20/2017    1. Patient will move from supine to sit and sit to supine and roll side to side in bed with independence within 4 days. 2. Patient will perform sit to stand with independence within 4 days. 3. Patient will ambulate with independence for 150 feet with the least restrictive device within 4 days. 4. Patient will ascend/descend 13 stairs with 1 handrail(s) with modified independence within 4 days. 5. Patient will verbalize and demonstrate understanding of spinal precautions (No bending, lifting greater than 5 lbs, or twisting; log-roll technique; frequent repositioning as instructed) within 4 days. PHYSICAL THERAPY TREATMENT  Patient: Filipe Love (43 y.o. female)  Date: 9/21/2017  Precautions: Back      ASSESSMENT:  Patient making steady progress toward goals. Reports significantly improved pain this session and is agreeable to ambulate greater distance. Currently needs supervision for bed mobility and transfers. Amb approx 100 feet with RW and CGA-SBA with decreased step clearance and slow atilio. Left up in chair at end of session with needs in reach. Recommend  [facility] Will continue to follow. Progression toward goals:  [X]      Improving appropriately and progressing toward goals  [ ]      Improving slowly and progressing toward goals  [ ]      Not making progress toward goals and plan of care will be adjusted       PLAN:  Patient continues to benefit from skilled intervention to address the above impairments. Continue treatment per established plan of care. Discharge Recommendations:  Home Health VS None  Further Equipment Recommendations for Discharge:  rolling walker VS None       SUBJECTIVE:   Patient stated I'm having better pain control this afternoon.    The patient stated 3/3 back precautions. Reviewed all 3 with patient. OBJECTIVE DATA SUMMARY:   Functional Mobility Training:  Bed Mobility:  Log Rolling: Supervision  Supine to Sit: Supervision     Scooting: Supervision        Transfers:  Sit to Stand: Supervision  Stand to Sit: Supervision                             Ambulation/Gait Training:  Distance (ft): 100 Feet (ft)  Assistive Device: Gait belt;Walker, rolling  Ambulation - Level of Assistance: Contact guard assistance        Gait Abnormalities: Antalgic;Decreased step clearance;Shuffling gait              Speed/Zehra: Pace decreased (<100 feet/min); Shuffled; Slow  Step Length: Left shortened;Right shortened        Pain:  Pain Scale 1: Numeric (0 - 10)  Pain Intensity 1: 3  Pain Location 1: Back     Pain Description 1: Cramping; Shooting  Pain Intervention(s) 1: Medication (see MAR)  Activity Tolerance:   VSS  Please refer to the flowsheet for vital signs taken during this treatment.   After treatment:   [X]  Patient left in no apparent distress sitting up in chair  [ ]  Patient left in no apparent distress in bed  [X]  Call bell left within reach  [X]  Nursing notified  [ ]  Caregiver present  [ ]  Bed alarm activated      COMMUNICATION/COLLABORATION:   The patients plan of care was discussed with: Physical Therapist and Registered Nurse     Chapito Crespo PT, DPT   Time Calculation: 23 mins No

## 2025-07-01 ENCOUNTER — ANESTHESIA (OUTPATIENT)
Facility: HOSPITAL | Age: 40
End: 2025-07-01
Payer: MEDICAID

## 2025-07-01 ENCOUNTER — ANESTHESIA EVENT (OUTPATIENT)
Facility: HOSPITAL | Age: 40
End: 2025-07-01
Payer: MEDICAID

## 2025-07-01 ENCOUNTER — HOSPITAL ENCOUNTER (OUTPATIENT)
Facility: HOSPITAL | Age: 40
Setting detail: OUTPATIENT SURGERY
Discharge: HOME | End: 2025-07-01
Attending: INTERNAL MEDICINE | Admitting: INTERNAL MEDICINE
Payer: MEDICAID

## 2025-07-01 VITALS
BODY MASS INDEX: 40.03 KG/M2 | WEIGHT: 255.07 LBS | TEMPERATURE: 98.1 F | SYSTOLIC BLOOD PRESSURE: 116 MMHG | HEART RATE: 71 BPM | OXYGEN SATURATION: 100 % | RESPIRATION RATE: 16 BRPM | HEIGHT: 67 IN | DIASTOLIC BLOOD PRESSURE: 85 MMHG

## 2025-07-01 PROCEDURE — 3700000000 HC ANESTHESIA ATTENDED CARE: Performed by: INTERNAL MEDICINE

## 2025-07-01 PROCEDURE — 6360000002 HC RX W HCPCS: Performed by: NURSE ANESTHETIST, CERTIFIED REGISTERED

## 2025-07-01 PROCEDURE — 3600007502: Performed by: INTERNAL MEDICINE

## 2025-07-01 PROCEDURE — 3700000001 HC ADD 15 MINUTES (ANESTHESIA): Performed by: INTERNAL MEDICINE

## 2025-07-01 PROCEDURE — 2709999900 HC NON-CHARGEABLE SUPPLY: Performed by: INTERNAL MEDICINE

## 2025-07-01 PROCEDURE — 7100000010 HC PHASE II RECOVERY - FIRST 15 MIN: Performed by: INTERNAL MEDICINE

## 2025-07-01 PROCEDURE — 3600007512: Performed by: INTERNAL MEDICINE

## 2025-07-01 PROCEDURE — 88305 TISSUE EXAM BY PATHOLOGIST: CPT

## 2025-07-01 PROCEDURE — 7100000011 HC PHASE II RECOVERY - ADDTL 15 MIN: Performed by: INTERNAL MEDICINE

## 2025-07-01 RX ORDER — PROPOFOL 10 MG/ML
INJECTION, EMULSION INTRAVENOUS
Status: DISCONTINUED | OUTPATIENT
Start: 2025-07-01 | End: 2025-07-01 | Stop reason: SDUPTHER

## 2025-07-01 RX ORDER — HYDROXYCHLOROQUINE SULFATE 200 MG/1
TABLET, FILM COATED ORAL DAILY
COMMUNITY

## 2025-07-01 RX ORDER — SODIUM CHLORIDE 9 MG/ML
INJECTION, SOLUTION INTRAVENOUS CONTINUOUS
Status: DISCONTINUED | OUTPATIENT
Start: 2025-07-01 | End: 2025-07-01 | Stop reason: HOSPADM

## 2025-07-01 RX ORDER — FLUOXETINE 10 MG/1
10 TABLET, FILM COATED ORAL DAILY
COMMUNITY

## 2025-07-01 RX ADMIN — PROPOFOL 150 MCG/KG/MIN: 10 INJECTION, EMULSION INTRAVENOUS at 12:25

## 2025-07-01 ASSESSMENT — PAIN SCALES - GENERAL
PAINLEVEL_OUTOF10: 0

## 2025-07-01 ASSESSMENT — PAIN - FUNCTIONAL ASSESSMENT: PAIN_FUNCTIONAL_ASSESSMENT: 0-10

## 2025-07-01 NOTE — PROGRESS NOTES
Received recovery report from anesthesia team, see anesthesia note. Abdomen remains soft and non-tender post-procedure. Pt has no complaints at this time and tolerated procedure well. Endoscope was pre-cleaned at the bedside by emiliano immediately following procedure. Post recovery report given to alba.

## 2025-07-01 NOTE — OP NOTE
[facility]  Jesus Grande M.D.  [phone]           2025                EGD Operative Report  Juany Myers  :  1985  [facility] Record Number:  [id number]      Indication: Abdominal pain, epigastric, GERD    : Jesus Grande MD    Referring Provider:  Mor Lee III, MD      Anesthesia/Sedation:  MAC anesthesia    Airway assessment: No airway problems anticipated    Pre-Procedural Exam:      Airway: clear, no airway problems anticipated  Heart: RRR, without gallops or rubs  Lungs: clear bilaterally without wheezes, crackles, or rhonchi  Abdomen: soft, nontender, nondistended, bowel sounds present  Mental Status: awake, alert and oriented to person, place and time       Procedure Details     After infomed consent was obtained for the procedure, with all risks and benefits of procedure explained the patient was taken to the endoscopy suite and placed in the left lateral decubitus position.  Following sequential administration of sedation as per above, the endoscope was inserted into the mouth and advanced under direct vision to second portion of the duodenum.  A careful inspection was made as the gastroscope was withdrawn, including a retroflexed view of the proximal stomach; findings and interventions are described below.      Findings:   Esophagus:normal  Stomach: Small hiatal hernia, about 3 cm in length, one diminutive 2 mm polyp, otherwise normal mucosa throughout the stomach  Duodenum/jejunum: normal    Therapies:  none    Specimens: Biopsies obtained from the stomach, gastric polyp, duodenum and mid-esophagus           Complications:   None; patient tolerated the procedure well.    EBL:  None.           Impression:    Small hiatal hernia, one diminutive gastric polyp, otherwise normal mucosa.    Recommendations:    -Continue acid suppression.  -Await pathology.  -Continue with anti-reflux measures  -Follow-up office visit    Jesus Grande MD

## 2025-07-01 NOTE — ANESTHESIA POSTPROCEDURE EVALUATION
Department of Anesthesiology  Postprocedure Note    Patient: Juany Myers  MRN: [id number]  YOB: 1985  Date of evaluation: 7/1/2025    Procedure Summary       Date: 07/01/25 Room / Location: [facility] ENDO 03 / [facility] ENDOSCOPY    Anesthesia Start: 1222 Anesthesia Stop: 1236    Procedures:       ESOPHAGOGASTRODUODENOSCOPY (Upper GI Region)      ESOPHAGOGASTRODUODENOSCOPY BIOPSY (Upper GI Region)      ESOPHAGOGASTRODUODENOSCOPY POLYP SNARE (Upper GI Region) Diagnosis:       Gastroesophageal reflux disease, unspecified whether esophagitis present      Change in bowel habits      Diarrhea, unspecified type      (Gastroesophageal reflux disease, unspecified whether esophagitis present [K21.9])      (Change in bowel habits [R19.4])      (Diarrhea, unspecified type [R19.7])    Surgeons: Jesus Grande MD Responsible Provider: Anmol Mcintosh MD    Anesthesia Type: MAC ASA Status: 2            Anesthesia Type: No value filed.    Miguel Phase I: Miguel Score: 10    Miguel Phase II: Miguel Score: 10    Anesthesia Post Evaluation    Patient location during evaluation: PACU  Patient participation: complete - patient participated  Level of consciousness: awake  Pain score: 0  Airway patency: patent  Nausea & Vomiting: no nausea and no vomiting  Cardiovascular status: blood pressure returned to baseline  Respiratory status: acceptable  Hydration status: euvolemic  Pain management: adequate    No notable events documented.

## 2025-07-01 NOTE — PROGRESS NOTES
Juany Myers  1985  [id number]    Situation:  Verbal report received from: DALIA Grier   Procedure: Procedure(s):  ESOPHAGOGASTRODUODENOSCOPY  ESOPHAGOGASTRODUODENOSCOPY BIOPSY  ESOPHAGOGASTRODUODENOSCOPY POLYP SNARE    Background:    Preoperative diagnosis: Gastroesophageal reflux disease, unspecified whether esophagitis present [K21.9]  Change in bowel habits [R19.4]  Diarrhea, unspecified type [R19.7]  Postoperative diagnosis: * No post-op diagnosis entered *    :  Dr. Grande   Assistant(s): Circulator: Gracie Burger, RN  Circulator Assist: Claire Funes RN  Endoscopy Technician: Samuel Dalton    Specimens:   ID Type Source Tests Collected by Time Destination   1 : gastric bx Tissue Gastric SURGICAL PATHOLOGY Jesus Grande MD 7/1/2025 1230    2 : gastric polyp Tissue Gastric SURGICAL PATHOLOGY Jesus Grande MD 7/1/2025 1231    3 : duodenum bx Tissue Duodenum SURGICAL PATHOLOGY Jesus Grande MD 7/1/2025 1233    4 : mid esophagus bx Tissue Esophagus SURGICAL PATHOLOGY Jesus Grande MD 7/1/2025 1234      H. Pylori    no    Assessment:  Intra-procedure medications     Anesthesia gave intra-procedure sedation and medications, see anesthesia flow sheet    yes    Intravenous fluids: NS@ KVO     Vital signs stable    yes    Abdominal assessment: round and soft    yes    Recommendation:  Discharge patient per MD order   yes.  Return to floor  outpatient  Family or Friend   friend   Permission to share finding with family or friend   yes

## 2025-07-01 NOTE — DISCHARGE INSTRUCTIONS
[facility]  Jesus Grande M.D.  [phone]           2025  Juany Myers  :  1985  [facility] Medical Record Number:  [id number]        ENDOSCOPY FINDINGS:   Your endoscopy showed a small hiatal hernia, one diminutive polyp that was removed and sent to pathology, otherwise looked within normal.      EGD DISCHARGE INSTRUCTIONS    DISCOMFORT:  Sore throat- throat lozenges or warm salt water gargle  redness at IV site- apply warm compress to area; if redness or soreness persist- contact your physician  Gaseous discomfort- walking, belching will help relieve any discomfort  You may not operate a vehicle for 12 hours  You may not engage in an occupation involving machinery or appliances for rest of today  You may not drink alcoholic beverages for at least 12 hours  Avoid making any critical decisions for at least 24 hour    DIET:   You may resume your regular diet.    ACTIVITY  Spend the remainder of the day resting -  avoid any strenuous activity. Avoid driving or operating machinery.    CALL M.D.  ANY SIGN OF   Increasing pain, nausea, vomiting  Abdominal distension (swelling)  New increased bleeding (oral or rectal)  Fever (chills)  Pain in chest area  Bloody discharge from nose or mouth  Shortness of breath    Follow-up Instructions:   Call Dr. Grande for any questions or problems. Telephone # [phone]  Biopsies were obtained, the results will be available  in  5 to 7 days. We will call you to notify you of these results.   Continue same medications, would try a slow taper of PPI and use famotidine as needed. Continue with anti-reflux measures. Follow up in the office if symptoms are persistent.

## 2025-07-01 NOTE — ANESTHESIA PRE PROCEDURE
Department of Anesthesiology  Preprocedure Note       Name:  Juany Myers   Age:  40 y.o.  :  1985                                          MRN:  [id number]         Date:  2025      Surgeon: Surgeon(s):  Jesus Grande MD    Procedure: Procedure(s):  ESOPHAGOGASTRODUODENOSCOPY    Medications prior to admission:   Prior to Admission medications    Medication Sig Start Date End Date Taking? Authorizing Provider   FLUoxetine (PROZAC) 10 MG tablet Take 1 tablet by mouth daily   Yes Provider, MD Katia   hydroxychloroquine (PLAQUENIL) 200 MG tablet Take by mouth daily   Yes Provider, MD Katia   buPROPion (WELLBUTRIN XL) 150 MG extended release tablet ceived the following from [facility]: Outside name: buPROPion XL (WELLBUTRIN XL) 150 mg tablet 3/9/23  Yes Automatic Reconciliation, Ar   influenza quadrivalent split vaccine (FLUZONE;FLUARIX;FLULAVAL;AFLURIA) 0.5 ML injection Fluarix Quad [id number] (PF) 60 mcg (15 mcg x 4)/0.5 mL IM syringe   Yes Automatic Reconciliation, Ar   metoprolol succinate (TOPROL XL) 50 MG extended release tablet ceived the following from [facility]: Outside name: metoprolol succinate (TOPROL-XL) 50 mg XL tablet 3/6/23  Yes Automatic Reconciliation, Ar   metoprolol tartrate (LOPRESSOR) 50 MG tablet Take 1 tablet by mouth 2 times daily 20  Yes Automatic Reconciliation, Ar   pantoprazole (PROTONIX) 40 MG tablet Take 1 tablet by mouth daily   Yes Automatic Reconciliation, Ar   gabapentin (NEURONTIN) 300 MG capsule Take 300 mg by mouth. 3/13/23   Automatic Reconciliation, Ar   methocarbamol (ROBAXIN) 500 MG tablet ceived the following from [facility]: Outside name: methocarbamoL (ROBAXIN) 500 mg tablet 3/6/23   Automatic Reconciliation, Ar   oxyCODONE-acetaminophen (PERCOCET) 5-325 MG per tablet ceived the following from [facility]: Outside name: oxyCODONE-acetaminophen (PERCOCET) 5-325 mg per tablet 23

## 2025-07-01 NOTE — H&P
[address] [place]  [address]    Phone: [phone]    Fax: [phone]    MD Paul Wilkes MD Ramy Eid, MD Henry Ellett, MD Omer Khalid, MD Jeffrey LaFond, MD Christopher Young, MD Megan Caravas, TREY Arizmendi, TREY Bran, TREY Bee, TREY Kennedy, KARENA     [facility] #[id number]    Date: 2025 9:00 AM ET  Patient Name: Juany Myers  Account #: [id number]  Gender: Female   (age): 1985 (40)  Insurance:   [employer] [place] (Plan Type: Primary)  Provider:    Jesus Grande MD     Referring Physician:    Mor Lee MD  [address] [place] [id number] [phone] (phone)  [phone] (fax)     Chief Complaint:    acid reflux       History of Present Illness:  IN 2017 she had laminectomy and was on NSAIDs for pain and now she can not take because it sets her insides on fire. She was diagnosed with gastroparesis at [facility] and started on pantoprazole, still has some symptoms of bloating and nausea but it helped the heartburn. If she misses a dose she has symptoms of heartburn. She had an endoscopy done over 10 years. She was diagnosed with lupus about 20 years ago and that's when she had the endoscopy and colonoscopy done. Lupus is pretty ok for now. She is on Plaquenil and that helps. She has 2 to 3 bowel movements a day, usually.   Past Medical History  Medications:   bupropion HCl 150 mg  fluoxetine 20 mg  metoprolol succinate 50 mg  pantoprazole 40 mg  Allergies:   Iodinated Contrast Media  iodine  Shellfish Containing Products  Past Medical History:   Anxiety disorder  Depression  Gastroesophageal Reflux Disease (GERD)  Previous Gastroenterology Procedures:   Colonoscopy  Previous Surgeries:   Joint surgery/replacement  Tubal ligation  Tonsillectomy    Immunizations:   Influenza vaccine  Social History  Occupation:    Notes: Teacher       [employer]

## (undated) DEVICE — INTENDED FOR TISSUE SEPARATION, AND OTHER PROCEDURES THAT REQUIRE A SHARP SURGICAL BLADE TO PUNCTURE OR CUT.: Brand: BARD-PARKER ® CARBON RIB-BACK BLADES

## (undated) DEVICE — DERMABOND SKIN ADH 0.7ML -- DERMABOND ADVANCED 12/BX

## (undated) DEVICE — MASTISOL ADHESIVE LIQ 2/3ML

## (undated) DEVICE — MEDI-VAC NON-CONDUCTIVE SUCTION TUBING: Brand: CARDINAL HEALTH

## (undated) DEVICE — Z CONVERTED USE 2271043 CONTAINER SPEC COLL 4OZ SCR ON LID PEEL PCH

## (undated) DEVICE — IV STRT KT 3282] LSL INDUSTRIES INC]

## (undated) DEVICE — PILLOW POS AD L7IN R FOAM HD REST INTUB SLOT DISP

## (undated) DEVICE — CODMAN® INTEGRATED BIPOLAR CORD AND TUBING SET FLYING LEADS, ROTARY PUMP: Brand: CODMAN®

## (undated) DEVICE — SUTURE VCRL SZ 2-0 L18IN ABSRB UD L26MM CP-2 1/2 CIR REV J762D

## (undated) DEVICE — KENDALL SCD EXPRESS SLEEVES, KNEE LENGTH, MEDIUM: Brand: KENDALL SCD

## (undated) DEVICE — TELFA ADHESIVE ISLAND DRESSING: Brand: TELFA

## (undated) DEVICE — SET ADMIN 16ML TBNG L100IN 2 Y INJ SITE IV PIGGY BK DISP (ORDER IN MULIPLES OF 48)

## (undated) DEVICE — FLOSEAL HEMOSTATIC MATRIX, 10 ML: Brand: FLOSEAL

## (undated) DEVICE — BONE WAX WHITE: Brand: BONE WAX WHITE

## (undated) DEVICE — DRAIN KT WND 10FR RND 400ML --

## (undated) DEVICE — SOLUTION IV 1000ML 0.9% SOD CHL

## (undated) DEVICE — BITEBLOCK ENDOSCP 60FR MAXI WHT POLYETH STURDY W/ VELC WVN

## (undated) DEVICE — DEVON™ KNEE AND BODY STRAP 60" X 3" (1.5 M X 7.6 CM): Brand: DEVON

## (undated) DEVICE — SUTURE VCRL SZ 3-0 L27IN ABSRB UD CP-2 L26MM 1/2 CIR REV J868H

## (undated) DEVICE — BLUNTFILL: Brand: MONOJECT

## (undated) DEVICE — PREP SKN PREVAIL 40ML APPL --

## (undated) DEVICE — 1200 GUARD II KIT W/5MM TUBE W/O VAC TUBE: Brand: GUARDIAN

## (undated) DEVICE — ELECTRODE,RADIOTRANSLUCENT,FOAM,3PK: Brand: MEDLINE

## (undated) DEVICE — KIT COLON W/ 1.1OZ LUB AND 2 END

## (undated) DEVICE — CANNULA CUSH AD W/ 14FT TBG

## (undated) DEVICE — FLOSEAL MATRIX IS INDICATED IN SURGICAL PROCEDURES (OTHER THAN IN OPHTHALMIC) AS AN ADJUNCT TO HEMOSTASIS WHEN CONTROL OF BLEEDING BY LIGATURE OR CONVENTIONALPROCEDURES IS INEFFECTIVE OR IMPRACTICAL.: Brand: FLOSEAL HEMOSTATIC MATRIX

## (undated) DEVICE — SUTURE ETHLN SZ 2-0 L18IN NONABSORBABLE BLK L26MM FS 3/8 664G

## (undated) DEVICE — SOLIDIFIER FLD 2OZ 1500CC N DISINF IN BTL DISP SAFESORB

## (undated) DEVICE — SUTURE VCRL SZ 3-0 L18IN ABSRB UD CP-2 L26MM 1/2 CIR REV J761D

## (undated) DEVICE — SYRINGE MED 5ML STD CLR PLAS LUERLOCK TIP N CTRL DISP

## (undated) DEVICE — SUTURE VCRL SZ 0 L18IN ABSRB VLT L36MM CT-1 1/2 CIR J740D

## (undated) DEVICE — SOLUTION IV 250ML 0.9% SOD CHL CLR INJ FLX BG CONT PRT CLSR

## (undated) DEVICE — INFECTION CONTROL KIT SYS

## (undated) DEVICE — SYRINGE MEDICAL 3ML CLEAR PLASTIC STANDARD NON CONTROL LUERLOCK TIP DISPOSABLE

## (undated) DEVICE — LAMINECTOMY RICHMOND-LF: Brand: MEDLINE INDUSTRIES, INC.

## (undated) DEVICE — CATHETER IV 22GA L1IN OD0.8382-0.9144MM ID0.6096-0.6858MM 382523

## (undated) DEVICE — BLUNTFILL WITH FILTER: Brand: MONOJECT

## (undated) DEVICE — SUTURE MCRYL SZ 4 0 L18IN ABSRB UD PC 5 L19MM 3 8 CIR SGL Y823G